# Patient Record
Sex: MALE | Race: WHITE | Employment: OTHER | URBAN - NONMETROPOLITAN AREA
[De-identification: names, ages, dates, MRNs, and addresses within clinical notes are randomized per-mention and may not be internally consistent; named-entity substitution may affect disease eponyms.]

---

## 2020-02-07 ENCOUNTER — OFFICE VISIT (OUTPATIENT)
Dept: PRIMARY CARE CLINIC | Age: 75
End: 2020-02-07
Payer: MEDICARE

## 2020-02-07 VITALS
SYSTOLIC BLOOD PRESSURE: 132 MMHG | DIASTOLIC BLOOD PRESSURE: 74 MMHG | BODY MASS INDEX: 34.01 KG/M2 | OXYGEN SATURATION: 98 % | HEART RATE: 74 BPM | TEMPERATURE: 98 F | WEIGHT: 237 LBS

## 2020-02-07 PROCEDURE — G8427 DOCREV CUR MEDS BY ELIG CLIN: HCPCS | Performed by: FAMILY MEDICINE

## 2020-02-07 PROCEDURE — G8484 FLU IMMUNIZE NO ADMIN: HCPCS | Performed by: FAMILY MEDICINE

## 2020-02-07 PROCEDURE — 4004F PT TOBACCO SCREEN RCVD TLK: CPT | Performed by: FAMILY MEDICINE

## 2020-02-07 PROCEDURE — 4040F PNEUMOC VAC/ADMIN/RCVD: CPT | Performed by: FAMILY MEDICINE

## 2020-02-07 PROCEDURE — 99202 OFFICE O/P NEW SF 15 MIN: CPT | Performed by: FAMILY MEDICINE

## 2020-02-07 PROCEDURE — 3017F COLORECTAL CA SCREEN DOC REV: CPT | Performed by: FAMILY MEDICINE

## 2020-02-07 PROCEDURE — G8419 CALC BMI OUT NRM PARAM NOF/U: HCPCS | Performed by: FAMILY MEDICINE

## 2020-02-07 PROCEDURE — 99203 OFFICE O/P NEW LOW 30 MIN: CPT | Performed by: FAMILY MEDICINE

## 2020-02-07 PROCEDURE — 1123F ACP DISCUSS/DSCN MKR DOCD: CPT | Performed by: FAMILY MEDICINE

## 2020-02-07 RX ORDER — AZITHROMYCIN 250 MG/1
TABLET, FILM COATED ORAL
Qty: 1 PACKET | Refills: 1 | Status: SHIPPED | OUTPATIENT
Start: 2020-02-07 | End: 2022-05-05 | Stop reason: ALTCHOICE

## 2020-02-07 ASSESSMENT — ENCOUNTER SYMPTOMS
RHINORRHEA: 1
CONSTIPATION: 0
SINUS COMPLAINT: 1
NAUSEA: 0
SORE THROAT: 1
SINUS PAIN: 1
COUGH: 1
SINUS PRESSURE: 1
ABDOMINAL PAIN: 0
WHEEZING: 0
DIARRHEA: 0
EYE DISCHARGE: 0
VOMITING: 0
TROUBLE SWALLOWING: 0
SHORTNESS OF BREATH: 0
EYE REDNESS: 0

## 2020-02-07 ASSESSMENT — PATIENT HEALTH QUESTIONNAIRE - PHQ9
SUM OF ALL RESPONSES TO PHQ QUESTIONS 1-9: 0
1. LITTLE INTEREST OR PLEASURE IN DOING THINGS: 0
SUM OF ALL RESPONSES TO PHQ9 QUESTIONS 1 & 2: 0
SUM OF ALL RESPONSES TO PHQ QUESTIONS 1-9: 0
2. FEELING DOWN, DEPRESSED OR HOPELESS: 0

## 2020-02-07 NOTE — PROGRESS NOTES
2020     Iris (:  3/5/1944) is a 76 y.o. male, here for evaluation of the following medical concerns:    Sinus Problem   This is a new problem. The current episode started 1 to 4 weeks ago. The problem has been gradually worsening since onset. There has been no fever. Associated symptoms include congestion, coughing, headaches, sinus pressure and a sore throat (feels raw). Pertinent negatives include no chills, ear pain, neck pain or shortness of breath. Treatments tried: theraflu. The treatment provided moderate relief. Did review patient's med list, allergies, social history,pmhx and pshx today as noted in the record. Review of Systems   Constitutional: Negative for chills, fatigue and fever. HENT: Positive for congestion, postnasal drip, rhinorrhea, sinus pressure, sinus pain and sore throat (feels raw). Negative for ear pain and trouble swallowing. Eyes: Negative for discharge and redness. Respiratory: Positive for cough. Negative for shortness of breath and wheezing. Cardiovascular: Negative for chest pain. Gastrointestinal: Negative for abdominal pain, constipation, diarrhea, nausea and vomiting. Genitourinary: Negative for dysuria, flank pain, frequency and urgency. Musculoskeletal: Negative for arthralgias, myalgias and neck pain. Skin: Negative for rash and wound. Allergic/Immunologic: Negative for environmental allergies. Neurological: Positive for headaches. Negative for dizziness, weakness and light-headedness. Hematological: Negative for adenopathy. Psychiatric/Behavioral: Negative. Prior to Visit Medications    Medication Sig Taking? Authorizing Provider   GLIPIZIDE PO Take 5 mg by mouth daily  Yes Historical Provider, MD   metFORMIN (GLUCOPHAGE) 850 MG tablet Take 850 mg by mouth 2 times daily (with meals).  Yes Historical Provider, MD        Social History     Tobacco Use    Smoking status: Current Every Day Smoker     Packs/day: 1.50 Types: Cigarettes    Smokeless tobacco: Never Used   Substance Use Topics    Alcohol use: No        Vitals:    02/07/20 1502   BP: 132/74   Site: Left Upper Arm   Position: Sitting   Cuff Size: Large Adult   Pulse: 74   Temp: 98 °F (36.7 °C)   TempSrc: Tympanic   SpO2: 98%   Weight: 237 lb (107.5 kg)     Estimated body mass index is 34.01 kg/m² as calculated from the following:    Height as of 7/11/16: 5' 10\" (1.778 m). Weight as of this encounter: 237 lb (107.5 kg). Physical Exam  Vitals signs and nursing note reviewed. Constitutional:       General: He is not in acute distress. Appearance: Normal appearance. He is well-developed. He is not diaphoretic. HENT:      Head: Normocephalic and atraumatic. Right Ear: External ear normal.      Left Ear: External ear normal.      Ears:      Comments: TMs dull with fluid behind the TM     Nose: Congestion and rhinorrhea present. Mouth/Throat:      Pharynx: No posterior oropharyngeal erythema. Comments: Post nasal drainage noted  Eyes:      General: No scleral icterus. Right eye: No discharge. Left eye: No discharge. Conjunctiva/sclera: Conjunctivae normal.      Pupils: Pupils are equal, round, and reactive to light. Neck:      Musculoskeletal: Normal range of motion and neck supple. Thyroid: No thyromegaly. Cardiovascular:      Rate and Rhythm: Normal rate and regular rhythm. Heart sounds: Normal heart sounds. Pulmonary:      Effort: Pulmonary effort is normal. No respiratory distress. Breath sounds: Normal breath sounds. No wheezing. Lymphadenopathy:      Cervical: Cervical adenopathy present. Skin:     General: Skin is warm and dry. Findings: No rash. Neurological:      Mental Status: He is alert and oriented to person, place, and time. Psychiatric:         Behavior: Behavior normal.         Thought Content:  Thought content normal.         Judgment: Judgment normal.

## 2022-04-22 ENCOUNTER — HOSPITAL ENCOUNTER (OUTPATIENT)
Dept: LAB | Age: 77
Discharge: HOME | End: 2022-04-22
Payer: MEDICARE

## 2022-04-22 LAB
ABSOLUTE EOS #: 0.29 K/UL (ref 0–0.44)
ABSOLUTE IMMATURE GRANULOCYTE: <0.03 K/UL (ref 0–0.3)
ABSOLUTE LYMPH #: 1.1 K/UL (ref 1.1–3.7)
ABSOLUTE MONO #: 0.75 K/UL (ref 0.1–1.2)
ANION GAP SERPL CALCULATED.3IONS-SCNC: 8 MMOL/L (ref 9–17)
BASOPHILS # BLD: 1 % (ref 0–2)
BASOPHILS ABSOLUTE: 0.04 K/UL (ref 0–0.2)
BUN BLDV-MCNC: 21 MG/DL (ref 8–23)
BUN/CREAT BLD: 9 (ref 9–20)
CALCIUM SERPL-MCNC: 8.7 MG/DL (ref 8.6–10.4)
CHLORIDE BLD-SCNC: 99 MMOL/L (ref 98–107)
CO2: 27 MMOL/L (ref 20–31)
CREAT SERPL-MCNC: 2.32 MG/DL (ref 0.7–1.2)
EOSINOPHILS RELATIVE PERCENT: 4 % (ref 1–4)
GFR AFRICAN AMERICAN: 33 ML/MIN
GFR NON-AFRICAN AMERICAN: 27 ML/MIN
GFR SERPL CREATININE-BSD FRML MDRD: ABNORMAL ML/MIN/{1.73_M2}
GLUCOSE BLD-MCNC: 179 MG/DL (ref 70–99)
HCT VFR BLD CALC: 25.4 % (ref 40.7–50.3)
HEMOGLOBIN: 8.1 G/DL (ref 13–17)
IMMATURE GRANULOCYTES: 0 %
LYMPHOCYTES # BLD: 15 % (ref 24–43)
MAGNESIUM: 2 MG/DL (ref 1.6–2.6)
MCH RBC QN AUTO: 29.9 PG (ref 25.2–33.5)
MCHC RBC AUTO-ENTMCNC: 31.9 G/DL (ref 25.2–33.5)
MCV RBC AUTO: 93.7 FL (ref 82.6–102.9)
MONOCYTES # BLD: 10 % (ref 3–12)
NRBC AUTOMATED: 0 PER 100 WBC
PDW BLD-RTO: 16.4 % (ref 11.8–14.4)
PLATELET # BLD: 261 K/UL (ref 138–453)
PMV BLD AUTO: 10.3 FL (ref 8.1–13.5)
POTASSIUM SERPL-SCNC: 4.4 MMOL/L (ref 3.7–5.3)
RBC # BLD: 2.71 M/UL (ref 4.21–5.77)
RBC # BLD: ABNORMAL 10*6/UL
SEG NEUTROPHILS: 70 % (ref 36–65)
SEGMENTED NEUTROPHILS ABSOLUTE COUNT: 5.03 K/UL (ref 1.5–8.1)
SODIUM BLD-SCNC: 134 MMOL/L (ref 135–144)
WBC # BLD: 7.2 K/UL (ref 3.5–11.3)

## 2022-04-22 PROCEDURE — 36415 COLL VENOUS BLD VENIPUNCTURE: CPT

## 2022-04-22 PROCEDURE — 80048 BASIC METABOLIC PNL TOTAL CA: CPT

## 2022-04-22 PROCEDURE — 83735 ASSAY OF MAGNESIUM: CPT

## 2022-04-22 PROCEDURE — 85025 COMPLETE CBC W/AUTO DIFF WBC: CPT

## 2022-04-29 ENCOUNTER — OFFICE VISIT (OUTPATIENT)
Dept: FAMILY MEDICINE CLINIC | Age: 77
End: 2022-04-29
Payer: MEDICARE

## 2022-04-29 ENCOUNTER — HOSPITAL ENCOUNTER (OUTPATIENT)
Dept: GENERAL RADIOLOGY | Age: 77
Discharge: HOME | End: 2022-05-01
Payer: MEDICARE

## 2022-04-29 ENCOUNTER — HOSPITAL ENCOUNTER (OUTPATIENT)
Dept: LAB | Age: 77
Discharge: HOME | End: 2022-04-29
Payer: MEDICARE

## 2022-04-29 VITALS
HEART RATE: 58 BPM | DIASTOLIC BLOOD PRESSURE: 60 MMHG | OXYGEN SATURATION: 99 % | SYSTOLIC BLOOD PRESSURE: 128 MMHG | HEIGHT: 70 IN | BODY MASS INDEX: 29.06 KG/M2 | WEIGHT: 203 LBS

## 2022-04-29 DIAGNOSIS — Z76.89 ENCOUNTER TO ESTABLISH CARE: Primary | ICD-10-CM

## 2022-04-29 DIAGNOSIS — I10 PRIMARY HYPERTENSION: ICD-10-CM

## 2022-04-29 DIAGNOSIS — L97.511 SKIN ULCER OF TOE OF RIGHT FOOT, LIMITED TO BREAKDOWN OF SKIN (HCC): ICD-10-CM

## 2022-04-29 DIAGNOSIS — N18.4 CKD (CHRONIC KIDNEY DISEASE) STAGE 4, GFR 15-29 ML/MIN (HCC): ICD-10-CM

## 2022-04-29 DIAGNOSIS — E11.8 TYPE 2 DIABETES MELLITUS WITH COMPLICATION (HCC): ICD-10-CM

## 2022-04-29 DIAGNOSIS — Z87.19 H/O LOWER GASTROINTESTINAL BLEEDING: ICD-10-CM

## 2022-04-29 DIAGNOSIS — I25.810 CORONARY ARTERY DISEASE INVOLVING CORONARY BYPASS GRAFT OF NATIVE HEART WITHOUT ANGINA PECTORIS: ICD-10-CM

## 2022-04-29 DIAGNOSIS — K59.00 CONSTIPATION, UNSPECIFIED CONSTIPATION TYPE: ICD-10-CM

## 2022-04-29 LAB
ABSOLUTE EOS #: 0.22 K/UL (ref 0–0.44)
ABSOLUTE IMMATURE GRANULOCYTE: <0.03 K/UL (ref 0–0.3)
ABSOLUTE LYMPH #: 1.08 K/UL (ref 1.1–3.7)
ABSOLUTE MONO #: 0.66 K/UL (ref 0.1–1.2)
ANION GAP SERPL CALCULATED.3IONS-SCNC: 11 MMOL/L (ref 9–17)
BASOPHILS # BLD: 1 % (ref 0–2)
BASOPHILS ABSOLUTE: 0.03 K/UL (ref 0–0.2)
BUN BLDV-MCNC: 25 MG/DL (ref 8–23)
BUN/CREAT BLD: 12 (ref 9–20)
CALCIUM SERPL-MCNC: 8.8 MG/DL (ref 8.6–10.4)
CHLORIDE BLD-SCNC: 101 MMOL/L (ref 98–107)
CO2: 25 MMOL/L (ref 20–31)
CREAT SERPL-MCNC: 2.12 MG/DL (ref 0.7–1.2)
EOSINOPHILS RELATIVE PERCENT: 3 % (ref 1–4)
GFR AFRICAN AMERICAN: 37 ML/MIN
GFR NON-AFRICAN AMERICAN: 30 ML/MIN
GFR SERPL CREATININE-BSD FRML MDRD: ABNORMAL ML/MIN/{1.73_M2}
GLUCOSE BLD-MCNC: 74 MG/DL (ref 70–99)
HCT VFR BLD CALC: 25.7 % (ref 40.7–50.3)
HEMOGLOBIN: 8 G/DL (ref 13–17)
IMMATURE GRANULOCYTES: 0 %
LYMPHOCYTES # BLD: 17 % (ref 24–43)
MCH RBC QN AUTO: 28.9 PG (ref 25.2–33.5)
MCHC RBC AUTO-ENTMCNC: 31.1 G/DL (ref 25.2–33.5)
MCV RBC AUTO: 92.8 FL (ref 82.6–102.9)
MONOCYTES # BLD: 10 % (ref 3–12)
NRBC AUTOMATED: 0 PER 100 WBC
PDW BLD-RTO: 15.8 % (ref 11.8–14.4)
PLATELET # BLD: 220 K/UL (ref 138–453)
PMV BLD AUTO: 10.6 FL (ref 8.1–13.5)
POTASSIUM SERPL-SCNC: 4 MMOL/L (ref 3.7–5.3)
RBC # BLD: 2.77 M/UL (ref 4.21–5.77)
RBC # BLD: ABNORMAL 10*6/UL
SEG NEUTROPHILS: 69 % (ref 36–65)
SEGMENTED NEUTROPHILS ABSOLUTE COUNT: 4.42 K/UL (ref 1.5–8.1)
SODIUM BLD-SCNC: 137 MMOL/L (ref 135–144)
WBC # BLD: 6.4 K/UL (ref 3.5–11.3)

## 2022-04-29 PROCEDURE — 99214 OFFICE O/P EST MOD 30 MIN: CPT | Performed by: FAMILY MEDICINE

## 2022-04-29 PROCEDURE — 99213 OFFICE O/P EST LOW 20 MIN: CPT | Performed by: FAMILY MEDICINE

## 2022-04-29 PROCEDURE — G8427 DOCREV CUR MEDS BY ELIG CLIN: HCPCS | Performed by: FAMILY MEDICINE

## 2022-04-29 PROCEDURE — 4040F PNEUMOC VAC/ADMIN/RCVD: CPT | Performed by: FAMILY MEDICINE

## 2022-04-29 PROCEDURE — G8419 CALC BMI OUT NRM PARAM NOF/U: HCPCS | Performed by: FAMILY MEDICINE

## 2022-04-29 PROCEDURE — 73620 X-RAY EXAM OF FOOT: CPT

## 2022-04-29 PROCEDURE — 1123F ACP DISCUSS/DSCN MKR DOCD: CPT | Performed by: FAMILY MEDICINE

## 2022-04-29 PROCEDURE — 80048 BASIC METABOLIC PNL TOTAL CA: CPT

## 2022-04-29 PROCEDURE — 1036F TOBACCO NON-USER: CPT | Performed by: FAMILY MEDICINE

## 2022-04-29 PROCEDURE — 36415 COLL VENOUS BLD VENIPUNCTURE: CPT

## 2022-04-29 PROCEDURE — 85025 COMPLETE CBC W/AUTO DIFF WBC: CPT

## 2022-04-29 RX ORDER — LANOLIN ALCOHOL/MO/W.PET/CERES
200 CREAM (GRAM) TOPICAL DAILY
COMMUNITY
Start: 2022-03-31

## 2022-04-29 RX ORDER — HYDRALAZINE HYDROCHLORIDE 10 MG/1
10 TABLET, FILM COATED ORAL 3 TIMES DAILY
COMMUNITY
Start: 2022-04-19 | End: 2022-08-05

## 2022-04-29 RX ORDER — ERGOCALCIFEROL (VITAMIN D2) 1250 MCG
CAPSULE ORAL
COMMUNITY
Start: 2021-10-12

## 2022-04-29 RX ORDER — DOXYCYCLINE HYCLATE 100 MG
100 TABLET ORAL 2 TIMES DAILY
Qty: 20 TABLET | Refills: 0 | Status: SHIPPED | OUTPATIENT
Start: 2022-04-29 | End: 2022-05-09

## 2022-04-29 RX ORDER — BISACODYL 10 MG
10 SUPPOSITORY, RECTAL RECTAL DAILY
COMMUNITY
End: 2022-09-12

## 2022-04-29 RX ORDER — METOPROLOL SUCCINATE 25 MG/1
25 TABLET, EXTENDED RELEASE ORAL DAILY
COMMUNITY
Start: 2021-09-30 | End: 2022-07-11 | Stop reason: DRUGHIGH

## 2022-04-29 RX ORDER — ATORVASTATIN CALCIUM 80 MG/1
80 TABLET, FILM COATED ORAL DAILY
COMMUNITY
End: 2022-07-06

## 2022-04-29 RX ORDER — AMIODARONE HYDROCHLORIDE 200 MG/1
100 TABLET ORAL DAILY
COMMUNITY
Start: 2022-02-24

## 2022-04-29 RX ORDER — ISOSORBIDE DINITRATE 10 MG/1
10 TABLET ORAL 2 TIMES DAILY
COMMUNITY
Start: 2022-04-19

## 2022-04-29 RX ORDER — ATORVASTATIN CALCIUM 40 MG/1
40 TABLET, FILM COATED ORAL NIGHTLY
COMMUNITY
Start: 2021-09-30

## 2022-04-29 RX ORDER — INSULIN LISPRO 100 [IU]/ML
INJECTION, SOLUTION INTRAVENOUS; SUBCUTANEOUS
COMMUNITY
Start: 2022-02-24

## 2022-04-29 RX ORDER — ACETAMINOPHEN 325 MG/1
650 TABLET ORAL EVERY 6 HOURS PRN
Status: ON HOLD | COMMUNITY
End: 2022-07-02 | Stop reason: HOSPADM

## 2022-04-29 RX ORDER — QUETIAPINE FUMARATE 25 MG/1
25 TABLET, FILM COATED ORAL
COMMUNITY
Start: 2022-02-24

## 2022-04-29 RX ORDER — ALBUTEROL SULFATE 2.5 MG/3ML
2.5 SOLUTION RESPIRATORY (INHALATION) EVERY 6 HOURS PRN
COMMUNITY
End: 2022-08-05

## 2022-04-29 RX ORDER — FAMOTIDINE 20 MG/1
20 TABLET, FILM COATED ORAL 2 TIMES DAILY
COMMUNITY
Start: 2022-02-24 | End: 2022-07-06

## 2022-04-29 RX ORDER — POLYETHYLENE GLYCOL 3350 17 G/17G
17 POWDER, FOR SOLUTION ORAL DAILY PRN
Qty: 510 G | Refills: 0 | Status: SHIPPED | OUTPATIENT
Start: 2022-04-29 | End: 2022-05-29

## 2022-04-29 RX ORDER — ASPIRIN 81 MG/1
81 TABLET ORAL DAILY
COMMUNITY

## 2022-04-29 RX ORDER — FUROSEMIDE 40 MG/1
40 TABLET ORAL DAILY
COMMUNITY
Start: 2022-03-14 | End: 2022-05-13 | Stop reason: SDUPTHER

## 2022-04-29 SDOH — ECONOMIC STABILITY: FOOD INSECURITY: WITHIN THE PAST 12 MONTHS, THE FOOD YOU BOUGHT JUST DIDN'T LAST AND YOU DIDN'T HAVE MONEY TO GET MORE.: NEVER TRUE

## 2022-04-29 SDOH — ECONOMIC STABILITY: FOOD INSECURITY: WITHIN THE PAST 12 MONTHS, YOU WORRIED THAT YOUR FOOD WOULD RUN OUT BEFORE YOU GOT MONEY TO BUY MORE.: NEVER TRUE

## 2022-04-29 ASSESSMENT — SOCIAL DETERMINANTS OF HEALTH (SDOH): HOW HARD IS IT FOR YOU TO PAY FOR THE VERY BASICS LIKE FOOD, HOUSING, MEDICAL CARE, AND HEATING?: NOT HARD AT ALL

## 2022-04-29 ASSESSMENT — PATIENT HEALTH QUESTIONNAIRE - PHQ9
2. FEELING DOWN, DEPRESSED OR HOPELESS: 0
SUM OF ALL RESPONSES TO PHQ QUESTIONS 1-9: 0
1. LITTLE INTEREST OR PLEASURE IN DOING THINGS: 0
SUM OF ALL RESPONSES TO PHQ QUESTIONS 1-9: 0
SUM OF ALL RESPONSES TO PHQ9 QUESTIONS 1 & 2: 0

## 2022-04-29 NOTE — PROGRESS NOTES
HPI:  Patient comes in today for   Chief Complaint   Patient presents with    Diabetes    Hypertension   Patient here to establish has multiple medical problems use to follow with [facility] has CAD s/p CABG in 2/2022,cardiomyopathy  ,s/p CVA in 8/2021 had thrombolytics no residual probelms,HTN,DMType 2,CKD. Recently was hospitalized to [facility] with rectal bleed has had EGD and colonoscopy with polypectomy likely source of bleed. .No more blood in stools. No chest pain or SOB. Uses a walker or cane to ambulate. Has a sore in right great toe for the last 3 months is scheduled to see podiatry next week was seen by wound care while at [facility] has arterial doppler study was told was ok. Has quit smoking has been on nicoderm patch since his heart surgery. HISTORY:  Past Medical History:   Diagnosis Date    Cerebrovascular accident (CVA) due to embolism of cerebral artery ([address]     Coronary artery disease involving coronary bypass graft of native heart without angina pectoris     Tobacco abuse     Type II or unspecified type diabetes mellitus without mention of complication, not stated as uncontrolled        History reviewed. No pertinent surgical history. History reviewed. No pertinent family history.     Social History     Socioeconomic History    Marital status:      Spouse name: Not on file    Number of children: Not on file    Years of education: Not on file    Highest education level: Not on file   Occupational History    Not on file   Tobacco Use    Smoking status: Former Smoker     Packs/day: 1.50     Types: Cigarettes    Smokeless tobacco: Never Used   Substance and Sexual Activity    Alcohol use: No    Drug use: No    Sexual activity: Not on file   Other Topics Concern    Not on file   Social History Narrative    Not on file     Social Determinants of Health     Financial Resource Strain: Low Risk     Difficulty of Paying Living Expenses: Not hard at all   Food Insecurity: No Food Insecurity    Worried About Running Out of Food in the Last Year: Never true    Rylie of Food in the Last Year: Never true   Transportation Needs:     Lack of Transportation (Medical): Not on file    Lack of Transportation (Non-Medical):  Not on file   Physical Activity:     Days of Exercise per Week: Not on file    Minutes of Exercise per Session: Not on file   Stress:     Feeling of Stress : Not on file   Social Connections:     Frequency of Communication with Friends and Family: Not on file    Frequency of Social Gatherings with Friends and Family: Not on file    Attends Restorationist Services: Not on file    Active Member of [address] [place] [address] or Organizations: Not on file    Attends Club or Organization Meetings: Not on file    Marital Status: Not on file   Intimate Partner Violence:     Fear of Current or Ex-Partner: Not on file    Emotionally Abused: Not on file    Physically Abused: Not on file    Sexually Abused: Not on file   Housing Stability:     Unable to Pay for Housing in the Last Year: Not on file    Number of [place] in the Last Year: Not on file    Unstable Housing in the Last Year: Not on file       Current Outpatient Medications   Medication Sig Dispense Refill    Multiple Vitamins-Minerals (PRESERVISION AREDS 2 PO) Take 1 tablet by mouth 2 times daily      acetaminophen (TYLENOL) 325 MG tablet Take 650 mg by mouth every 6 hours as needed      albuterol (PROVENTIL) (2.5 MG/3ML) 0.083% nebulizer solution Inhale 2.5 mg into the lungs every 6 hours as needed      amiodarone (CORDARONE) 200 MG tablet Take 200 mg by mouth daily      apixaban (ELIQUIS) 5 MG TABS tablet Take 5 mg by mouth 2 times daily      aspirin 81 MG EC tablet Take 81 mg by mouth daily      bisacodyl (DULCOLAX) 10 MG suppository Place 10 mg rectally daily      ergocalciferol (ERGOCALCIFEROL) 1.25 MG (31283 UT) capsule TAKE ONE CAPSULE BY MOUTH ONCE EVERY WEEK FOR VITAMIN (D) DEFICIENCY      famotidine (PEPCID) 20 MG tablet Take 20 mg by mouth 2 times daily      furosemide (LASIX) 40 MG tablet Take 40 mg by mouth daily      hydrALAZINE (APRESOLINE) 10 MG tablet Take 10 mg by mouth 3 times daily      insulin lispro, 1 Unit Dial, 100 UNIT/ML SOPN Per s/s      isosorbide dinitrate (ISORDIL) 10 MG tablet Take 10 mg by mouth 3 times daily      magnesium oxide (MAG-OX) 400 (240 Mg) MG tablet Take 200 mg by mouth daily      metoprolol succinate (TOPROL XL) 25 MG extended release tablet Take 25 mg by mouth daily      QUEtiapine (SEROQUEL) 25 MG tablet Take 25 mg by mouth      atorvastatin (LIPITOR) 80 MG tablet Take 80 mg by mouth daily      GLIPIZIDE PO Take 5 mg by mouth daily       atorvastatin (LIPITOR) 40 MG tablet Take 40 mg by mouth nightly (Patient not taking: Reported on 4/29/2022)      azithromycin (ZITHROMAX Z-STEVE) 250 MG tablet Take as directed (Patient not taking: Reported on 4/29/2022) 1 packet 1    metFORMIN (GLUCOPHAGE) 850 MG tablet Take 850 mg by mouth 2 times daily (with meals). (Patient not taking: Reported on 4/29/2022)       No current facility-administered medications for this visit. Allergies   Allergen Reactions    Simvastatin Headaches       REVIEW OF SYSTEMS:  General: No fevers, chills, change in weight  HEENT: No double vision, blurry vision, runny nose, sore throat, tinnitus  Cardio: No chest pain, palpitations, WILLIS, edema, PND  Pulmonary: No cough, hemoptysis, SOB  GI: No nausea, vomiting, dysphagia, odynophagia, diarrhea, has chronic constipation.   : No dysuria, hematuria, urgency, incontinence  Musculoskeletal: No muscle or joint aches, no joint swelling  Neuro: No dizziness/lightheadedness, no seizures  Endocrine: No polyuria, polydipsia, polyphagia, no temperature intolerance  Skin: No lesions or itching  Sleep: good  Psychiatric: No depression or anxiety    PHYSICAL EXAM:  VS:  /60   Pulse 58   Ht 5' 10\" (1.778 m)   Wt 203 lb (92.1 kg)   SpO2 99%   BMI 29.13 kg/m² General:  Alert and oriented, NAD  HEENT:  TMs, STEFAN, EOMI, Conjunctivae clear       Throat currently clear. NECK:  Supple without adenopathy or thyromegaly, no carotid bruits  LUNGS:  CTA all fields  HEART:  Irregular  ABDOMEN:  Soft and nontender without palpable abnormalities  EXTREMITIES:Right great toe  with redness and swelling has a open area in the medial aspect with a eschar,has swelling in the feet and ankles. , no calf tenderness  NEURO:  No focal deficits. SKIN: Dry skin has some erythematous areas in both legs. ASSESSMENT/PLAN:     Diagnosis Orders   1. Encounter to establish care     2. CKD (chronic kidney disease) stage 4, GFR 15-29 ml/min ([facility])     3. Type 2 diabetes mellitus with complication ([facility])  Hemoglobin A1C   4. Coronary artery disease involving coronary bypass graft of native heart without angina pectoris     5. Skin ulcer of toe of right foot, limited to breakdown of skin ([facility])  XR FOOT RIGHT (2 VIEWS)    CBC with Auto Differential    Basic Metabolic Panel   6. Primary hypertension     7. H/O lower gastrointestinal bleeding     8. Constipation, unspecified constipation type         Orders Placed This Encounter   Procedures    XR FOOT RIGHT (2 VIEWS)     Standing Status:   Future     Standing Expiration Date:   4/29/2023    Hemoglobin A1C     Standing Status:   Future     Standing Expiration Date:   4/29/2023    CBC with Auto Differential     Standing Status:   Future     Standing Expiration Date:   4/29/2023    Basic Metabolic Panel     Standing Status:   Future     Standing Expiration Date:   4/29/2023     Requested Prescriptions      No prescriptions requested or ordered in this encounter   Xray right foot. Will start on Doxycycline 100 mg bid x 10 days,  Continue wound care for right foot  Patient is scheduled to see podiatry for his toe infection. Labs from last week with Creatinine of 2.32  Is scheduled to follow with nephology from his hospital  visit.   Wean off nicoderm patch.  Medications reviewed,continue with current meds. Miralax prn constipation. Will check CBC and BMP and hgba1c in 3 months as per wife his recent one was 5.5. Continue with home health  Return in about 3 months (around 7/29/2022), or if symptoms worsen or fail to improve.     Electronically signed by Adelina Mojica MD

## 2022-05-02 ENCOUNTER — TELEPHONE (OUTPATIENT)
Dept: FAMILY MEDICINE CLINIC | Age: 77
End: 2022-05-02

## 2022-05-02 DIAGNOSIS — N18.4 CKD (CHRONIC KIDNEY DISEASE) STAGE 4, GFR 15-29 ML/MIN (HCC): Primary | ICD-10-CM

## 2022-05-02 NOTE — TELEPHONE ENCOUNTER
----- Message from Vianey Santoro MD sent at 5/2/2022 10:50 AM EDT -----  Xray foot look ok ,f/u with podaitry

## 2022-05-02 NOTE — TELEPHONE ENCOUNTER
Writer called pt's wife, Juan José Edward, and informed her of creatinine results. Nyasia stated that they do not have a nephrologist to follow up with. Referral for nephrology placed today. Nyasia voiced understanding and had no further questions.

## 2022-05-02 NOTE — TELEPHONE ENCOUNTER
Writer called pt's wife and informed her of foot x-ray results and to f/u with podiatry this week. Pt's wife, Earl Reynoso, voiced understanding and had no further questions.

## 2022-05-02 NOTE — TELEPHONE ENCOUNTER
----- Message from Madelene Riedel, MD sent at 5/2/2022 10:52 AM EDT -----  Creatinine has slightly improved has follow up with nephrology from recent hospital visit

## 2022-05-05 ENCOUNTER — APPOINTMENT (OUTPATIENT)
Dept: INTERVENTIONAL RADIOLOGY/VASCULAR | Age: 77
End: 2022-05-05
Payer: MEDICARE

## 2022-05-05 ENCOUNTER — OFFICE VISIT (OUTPATIENT)
Dept: PODIATRY | Age: 77
End: 2022-05-05
Payer: OTHER GOVERNMENT

## 2022-05-05 VITALS
SYSTOLIC BLOOD PRESSURE: 118 MMHG | RESPIRATION RATE: 20 BRPM | TEMPERATURE: 96.3 F | HEART RATE: 60 BPM | DIASTOLIC BLOOD PRESSURE: 60 MMHG

## 2022-05-05 DIAGNOSIS — I73.9 PAD (PERIPHERAL ARTERY DISEASE) (HCC): ICD-10-CM

## 2022-05-05 DIAGNOSIS — L97.512 SKIN ULCER OF RIGHT GREAT TOE WITH FAT LAYER EXPOSED (HCC): Primary | ICD-10-CM

## 2022-05-05 PROCEDURE — 4040F PNEUMOC VAC/ADMIN/RCVD: CPT | Performed by: PODIATRIST

## 2022-05-05 PROCEDURE — 1036F TOBACCO NON-USER: CPT | Performed by: PODIATRIST

## 2022-05-05 PROCEDURE — 1123F ACP DISCUSS/DSCN MKR DOCD: CPT | Performed by: PODIATRIST

## 2022-05-05 PROCEDURE — 99214 OFFICE O/P EST MOD 30 MIN: CPT | Performed by: PODIATRIST

## 2022-05-05 PROCEDURE — 99204 OFFICE O/P NEW MOD 45 MIN: CPT | Performed by: PODIATRIST

## 2022-05-05 PROCEDURE — G8417 CALC BMI ABV UP PARAM F/U: HCPCS | Performed by: PODIATRIST

## 2022-05-05 PROCEDURE — A9283 FOOT PRESS OFF LOAD SUPP DEV: HCPCS | Performed by: PODIATRIST

## 2022-05-05 PROCEDURE — G8427 DOCREV CUR MEDS BY ELIG CLIN: HCPCS | Performed by: PODIATRIST

## 2022-05-05 NOTE — PATIENT INSTRUCTIONS
Use betadine to right great toe twice daily then cover with a dry dressing. Use off loading surgical shoe at all times on right foot. Get vascular testing done as scheduled. Return to see Snow Handley as scheduled next week.

## 2022-05-05 NOTE — PROGRESS NOTES
Subjective:    Yennifer Aceves is a 68 y.o. male who presents with the ulceration of the R big toe. . Patient has not been compliant with off loading. Patient relates pain is Present. Pain is rated 3 out of 10 and is described as intermittent. Treatment so far was cleaned with saline and antibiotic ointment. Patient given antibiotic last week and is still on it. Patient states had test done while in the hospital recently for another issue. Was told her blood flow was okay. Currently denies F/C/N/V. Overall diabetic control is not changed. Allergies   Allergen Reactions    Simvastatin Headaches       Past Medical History:   Diagnosis Date    Cerebrovascular accident (CVA) due to embolism of cerebral artery (HCC)     CKD (chronic kidney disease) stage 4, GFR 15-29 ml/min ([facility])     Coronary artery disease involving coronary bypass graft of native heart without angina pectoris     H/O lower gastrointestinal bleeding     Tobacco abuse     Type II or unspecified type diabetes mellitus without mention of complication, not stated as uncontrolled        Prior to Admission medications    Medication Sig Start Date End Date Taking?  Authorizing Provider   Multiple Vitamins-Minerals (PRESERVISION AREDS 2 PO) Take 1 tablet by mouth 2 times daily   Yes Historical Provider, MD   acetaminophen (TYLENOL) 325 MG tablet Take 650 mg by mouth every 6 hours as needed   Yes Historical Provider, MD   albuterol (PROVENTIL) (2.5 MG/3ML) 0.083% nebulizer solution Inhale 2.5 mg into the lungs every 6 hours as needed   Yes Historical Provider, MD   amiodarone (CORDARONE) 200 MG tablet Take 200 mg by mouth daily 2/24/22  Yes Historical Provider, MD   apixaban (ELIQUIS) 5 MG TABS tablet Take 5 mg by mouth 2 times daily 3/14/22  Yes Historical Provider, MD   aspirin 81 MG EC tablet Take 81 mg by mouth daily   Yes Historical Provider, MD   atorvastatin (LIPITOR) 40 MG tablet Take 40 mg by mouth nightly  9/30/21  Yes Historical Provider, MD   bisacodyl (DULCOLAX) 10 MG suppository Place 10 mg rectally daily   Yes Historical Provider, MD   ergocalciferol (ERGOCALCIFEROL) 1.25 MG (48603 UT) capsule TAKE ONE CAPSULE BY MOUTH ONCE EVERY WEEK FOR VITAMIN (D) DEFICIENCY 10/12/21  Yes Historical Provider, MD   famotidine (PEPCID) 20 MG tablet Take 20 mg by mouth 2 times daily 2/24/22  Yes Historical Provider, MD   furosemide (LASIX) 40 MG tablet Take 40 mg by mouth daily 3/14/22  Yes Historical Provider, MD   hydrALAZINE (APRESOLINE) 10 MG tablet Take 10 mg by mouth 3 times daily 4/19/22  Yes Historical Provider, MD   insulin lispro, 1 Unit Dial, 100 UNIT/ML SOPN Per s/s 2/24/22  Yes Historical Provider, MD   isosorbide dinitrate (ISORDIL) 10 MG tablet Take 10 mg by mouth 3 times daily 4/19/22  Yes Historical Provider, MD   magnesium oxide (MAG-OX) 400 (240 Mg) MG tablet Take 200 mg by mouth daily 3/31/22  Yes Historical Provider, MD   metoprolol succinate (TOPROL XL) 25 MG extended release tablet Take 25 mg by mouth daily 9/30/21  Yes Historical Provider, MD   QUEtiapine (SEROQUEL) 25 MG tablet Take 25 mg by mouth 2/24/22  Yes Historical Provider, MD   atorvastatin (LIPITOR) 80 MG tablet Take 80 mg by mouth daily   Yes Historical Provider, MD   doxycycline hyclate (VIBRA-TABS) 100 MG tablet Take 1 tablet by mouth 2 times daily for 10 days 4/29/22 5/9/22 Yes Renee Magana MD   polyethylene glycol (GLYCOLAX) 17 GM/SCOOP powder Take 17 g by mouth daily as needed (CONSTIPATION) 4/29/22 5/29/22 Yes Renee Magana MD   GLIPIZIDE PO Take 5 mg by mouth daily    Yes Historical Provider, MD   metFORMIN (GLUCOPHAGE) 850 MG tablet Take 850 mg by mouth 2 times daily (with meals)    Yes Historical Provider, MD       Social History     Tobacco Use    Smoking status: Former Smoker     Packs/day: 1.50     Types: Cigarettes    Smokeless tobacco: Never Used   Substance Use Topics    Alcohol use: No       ROS: All 14 ROS systems reviewed and pertinent positives noted above, all others negative. Lower Extremity Physical Examination:     Vitals:   Vitals:    05/05/22 0851   BP: 118/60   Pulse:    Resp:    Temp:      General: AAO x 3 in NAD. Vascular: DP and PT pulses palpable 1/4, bilateral.  CFT >5 seconds, bilateral.  Hair growth absent to the level of the digits, bilateral.  Edema present, bilateral.  Varicosities present, bilateral. Erythema absent, bilateral. Distal Rubor present bilateral toes. Temperature decreased bilateral. Hyperpigmentation present bilateral. Atrophic skin yes. Neurological: Sensation intact to light touch to level of digits, bilateral.  Protective sensation intact 10/10 sites via 5.07/10g Mcalister-Pushpa Monofilament, bilateral.  negative Tinel's, bilateral.  negative Valleix sign, bilateral.  Vibratory intact bilateral.  Reflexes Decreased bilateral.  Paresthesias negative. Dysthesias negative. Sharp/dull intact bilateral.    Musculoskeletal: Muscle strength 5/5, bilateral.  Pain present upon palpation R hallux. Normal medial longitudinal arch, bilateral.  Ankle ROM decreased,bilateral.  1st MPJ ROM within normal limits, bilateral.  Dorsally contracted digits absent. No other foot deformities. Integument:   Open lesion present, Right. Ulcer medial right hallux with positive fibrin early eschar formation. There is no red granular tissue whatsoever. No undermining no probing no malodor no signs infection on periphery no proximal streaking. Interdigital maceration absent to web spaces , Bilateral.  Nails b/l thickened, dystrophic and crumbly, discolored with subungual debris. Fissures absent, Bilateral. Hyperkeratotic tissue is absent. Asessment: Patient is a 68 y.o. male with:    Diagnosis Orders   1. Skin ulcer of right great toe with fat layer exposed ([address]     2. PAD (peripheral artery disease) (HCC)  VL LOWER EXTREMITY ARTERIAL SEGMENTAL PRESSURES W PPG   3.  Uncontrolled type 2 DM with peripheral circulatory disorder (HCC)           Ulcer Classification:  Diabetic ulcer grade 2 C. IDSA  no infection. Plan:   Patient examined and evaluated. Diabetic foot education and exam.  Current condition and treatment options discussed in detail. Topical lidocaine applied to wound. Debridement none needed today. Today's dressing:betadine bid  Orders Placed This Encounter   Procedures    VL LOWER EXTREMITY ARTERIAL SEGMENTAL PRESSURES W PPG     Standing Status:   Future     Standing Expiration Date:   5/5/2023   DM foot ed and exam  Due to level of pain/deformity/condition, it is in my medical opinion that patient will benefit from and is medically necessary for offloading device at this time. Patient was dispensed a surgical shoe with offloading insole to wear 100% of the time WB. Patient was educated on appropriate use of the device and the need to be compliant with offloading therapy. Patient understands that non compliance with the device will be detrimental to the healing of the condition/ulceration. Patient needs the device to help support the foot and reduce pain. This device is medically necessary due to above listed diagnosis. Labs reviewed with pt in detail. All questions answered. Previous testing reviewed with patient in detail. Further recs post testing. Patient is moderate level medical decision making. Patient is chronic condition with exacerbation. Patient has test reviewed from another provider. Patient my risk morbidity due to the nonhealing ulceration with recent infection and PAD changes in the diabetic foot  Finish course of po abx  Contact clinic with any questions/problems/concerns or new signs of infection. Follow-up at [facility] in 1week(s).

## 2022-05-09 ENCOUNTER — HOSPITAL ENCOUNTER (OUTPATIENT)
Dept: WOUND CARE | Age: 77
Discharge: HOME | End: 2022-05-10
Payer: MEDICARE

## 2022-05-09 VITALS
RESPIRATION RATE: 20 BRPM | HEIGHT: 70 IN | DIASTOLIC BLOOD PRESSURE: 60 MMHG | SYSTOLIC BLOOD PRESSURE: 124 MMHG | HEART RATE: 64 BPM | BODY MASS INDEX: 29.13 KG/M2 | TEMPERATURE: 96.6 F

## 2022-05-09 DIAGNOSIS — L97.511 SKIN ULCER OF RIGHT GREAT TOE, LIMITED TO BREAKDOWN OF SKIN (HCC): ICD-10-CM

## 2022-05-09 DIAGNOSIS — I73.9 PAD (PERIPHERAL ARTERY DISEASE) (HCC): ICD-10-CM

## 2022-05-09 PROCEDURE — 99213 OFFICE O/P EST LOW 20 MIN: CPT | Performed by: PODIATRIST

## 2022-05-09 NOTE — PROGRESS NOTES
Subjective:    Bee Guevara is a 68 y.o. male who presents with the ulceration of the R big toe. . Patient has been compliant with off loading. Toe has felt better overall. Patient relates pain is Present. Pain is rated 2 out of 10 and is described as intermittent. No issues with dressings at home. Currently denies F/C/N/V. Overall diabetic control is not changed. Allergies   Allergen Reactions    Simvastatin Headaches       Past Medical History:   Diagnosis Date    Cerebrovascular accident (CVA) due to embolism of cerebral artery (HCC)     CKD (chronic kidney disease) stage 4, GFR 15-29 ml/min ([facility])     Coronary artery disease involving coronary bypass graft of native heart without angina pectoris     H/O lower gastrointestinal bleeding     Tobacco abuse     Type II or unspecified type diabetes mellitus without mention of complication, not stated as uncontrolled        Prior to Admission medications    Medication Sig Start Date End Date Taking?  Authorizing Provider   Multiple Vitamins-Minerals (PRESERVISION AREDS 2 PO) Take 1 tablet by mouth 2 times daily    Historical Provider, MD   acetaminophen (TYLENOL) 325 MG tablet Take 650 mg by mouth every 6 hours as needed    Historical Provider, MD   albuterol (PROVENTIL) (2.5 MG/3ML) 0.083% nebulizer solution Inhale 2.5 mg into the lungs every 6 hours as needed    Historical Provider, MD   amiodarone (CORDARONE) 200 MG tablet Take 200 mg by mouth daily 2/24/22   Historical Provider, MD   apixaban (ELIQUIS) 5 MG TABS tablet Take 5 mg by mouth 2 times daily 3/14/22   Historical Provider, MD   aspirin 81 MG EC tablet Take 81 mg by mouth daily    Historical Provider, MD   atorvastatin (LIPITOR) 40 MG tablet Take 40 mg by mouth nightly  9/30/21   Historical Provider, MD   bisacodyl (DULCOLAX) 10 MG suppository Place 10 mg rectally daily    Historical Provider, MD   ergocalciferol (ERGOCALCIFEROL) 1.25 MG (23237 UT) capsule TAKE ONE CAPSULE BY MOUTH ONCE EVERY WEEK FOR VITAMIN (D) DEFICIENCY 10/12/21   Historical Provider, MD   famotidine (PEPCID) 20 MG tablet Take 20 mg by mouth 2 times daily 2/24/22   Historical Provider, MD   furosemide (LASIX) 40 MG tablet Take 40 mg by mouth daily 3/14/22   Historical Provider, MD   hydrALAZINE (APRESOLINE) 10 MG tablet Take 10 mg by mouth 3 times daily 4/19/22   Historical Provider, MD   insulin lispro, 1 Unit Dial, 100 UNIT/ML SOPN Per s/s 2/24/22   Historical Provider, MD   isosorbide dinitrate (ISORDIL) 10 MG tablet Take 10 mg by mouth 3 times daily 4/19/22   Historical Provider, MD   magnesium oxide (MAG-OX) 400 (240 Mg) MG tablet Take 200 mg by mouth daily 3/31/22   Historical Provider, MD   metoprolol succinate (TOPROL XL) 25 MG extended release tablet Take 25 mg by mouth daily 9/30/21   Historical Provider, MD   QUEtiapine (SEROQUEL) 25 MG tablet Take 25 mg by mouth 2/24/22   Historical Provider, MD   atorvastatin (LIPITOR) 80 MG tablet Take 80 mg by mouth daily    Historical Provider, MD   doxycycline hyclate (VIBRA-TABS) 100 MG tablet Take 1 tablet by mouth 2 times daily for 10 days 4/29/22 5/9/22  Fidel Pennington MD   polyethylene glycol (GLYCOLAX) 17 GM/SCOOP powder Take 17 g by mouth daily as needed (CONSTIPATION) 4/29/22 5/29/22  Fidel Pennington MD   GLIPIZIDE PO Take 5 mg by mouth daily     Historical Provider, MD   metFORMIN (GLUCOPHAGE) 850 MG tablet Take 850 mg by mouth 2 times daily (with meals)     Historical Provider, MD       Social History     Tobacco Use    Smoking status: Former Smoker     Packs/day: 1.50     Types: Cigarettes    Smokeless tobacco: Never Used   Substance Use Topics    Alcohol use: No       ROS: All 14 ROS systems reviewed and pertinent positives noted above, all others negative. Lower Extremity Physical Examination:     Vitals:   Vitals:    05/09/22 1400   BP: 124/60   Pulse: 64   Resp: 20   Temp: 96.6 °F (35.9 °C)     General: AAO x 3 in NAD.      Vascular: DP and PT pulses palpable 1/4, bilateral.  CFT >5 seconds, bilateral.  Hair growth absent to the level of the digits, bilateral.  Edema present, bilateral.  Varicosities present, bilateral. Erythema absent, bilateral. Distal Rubor present bilateral toes. Temperature decreased bilateral. Hyperpigmentation present bilateral. Atrophic skin yes. Neurological: Sensation intact to light touch to level of digits, bilateral.  Protective sensation intact 10/10 sites via 5.07/10g [place]Push[place] Monofilament, bilateral.  negative Tinel's, bilateral.  negative Valleix sign, bilateral.  Vibratory intact bilateral.  Reflexes Decreased bilateral.  Paresthesias negative. Dysthesias negative. Sharp/dull intact bilateral.    Musculoskeletal: Muscle strength 5/5, bilateral.  Pain present upon palpation R hallux. Normal medial longitudinal arch, bilateral.  Ankle ROM decreased,bilateral.  1st MPJ ROM within normal limits, bilateral.  Dorsally contracted digits absent. No other foot deformities. Integument:   Open lesion present, Right. Ulcer medial right hallux with positive fibrin eschar formation. Very stable, There is no red granular tissue whatsoever. No undermining no probing no malodor no signs infection on periphery no proximal streaking. Interdigital maceration absent to web spaces , Bilateral.  Nails b/l thickened, dystrophic and crumbly, discolored with subungual debris. Fissures absent, Bilateral. Hyperkeratotic tissue is absent. CAPRI: see report in chart    Asessment: Patient is a 68 y.o. male with:    Diagnosis Orders   1. Skin ulcer of right great toe with fat layer exposed ([address]     2. PAD (peripheral artery disease) (HCC)  VL LOWER EXTREMITY ARTERIAL SEGMENTAL PRESSURES W PPG   3.  Uncontrolled type 2 DM with peripheral circulatory disorder [facility]       Hospital Problems           Last Modified POA    Uncontrolled type 2 DM with peripheral circulatory disorder ([address] 5/9/2022 Yes    PAD (peripheral artery disease) ([address] 5/9/2022 Yes    Skin ulcer of right great toe, limited to breakdown of skin ([address] 5/9/2022 Yes          Ulcer Classification:  Diabetic ulcer grade 2 C. IDSA  no infection. Plan:   Patient examined and evaluated. Diabetic foot education and exam.  Current condition and treatment options discussed in detail. Topical lidocaine applied to wound. Debridement none needed today. Today's dressing:betadine bid  Patient had the CAPRI/PVR test reviewed with them in detail. CAPRI was decreased bilateral 0.5 area. The great toe in the area of the wound right side was 0.18 TBI. Waveforms were decreased Bilateral . Digital waveforms were decreased left, right. Patient referred to vascular surgery. Seeing them and document 2 weeks. DM foot ed and exam  Continue offloading with surgical shoe. Further recs post testing. Patient is low level medical decision making. Patient is acute pelvic condition along with chronic condition with exacerbation. Patient 1 test reviewed from the provider. Low risk morbidity with current treatment course since ulcer is stable not infected. Contact clinic with any questions/problems/concerns or new signs of infection. Follow-up at [facility] in 1 week(s).

## 2022-05-13 ENCOUNTER — OFFICE VISIT (OUTPATIENT)
Dept: NEPHROLOGY | Age: 77
End: 2022-05-13
Payer: MEDICARE

## 2022-05-13 VITALS
HEIGHT: 70 IN | DIASTOLIC BLOOD PRESSURE: 68 MMHG | WEIGHT: 210 LBS | HEART RATE: 68 BPM | SYSTOLIC BLOOD PRESSURE: 116 MMHG | BODY MASS INDEX: 30.06 KG/M2

## 2022-05-13 DIAGNOSIS — I25.5 ISCHEMIC CARDIOMYOPATHY: ICD-10-CM

## 2022-05-13 DIAGNOSIS — I10 HYPERTENSION, ESSENTIAL: ICD-10-CM

## 2022-05-13 DIAGNOSIS — R60.0 BILATERAL LOWER EXTREMITY EDEMA: ICD-10-CM

## 2022-05-13 DIAGNOSIS — N18.32 STAGE 3B CHRONIC KIDNEY DISEASE (HCC): Primary | ICD-10-CM

## 2022-05-13 DIAGNOSIS — E55.9 VITAMIN D DEFICIENCY: ICD-10-CM

## 2022-05-13 DIAGNOSIS — E11.22 TYPE 2 DIABETES MELLITUS WITH STAGE 3B CHRONIC KIDNEY DISEASE, WITHOUT LONG-TERM CURRENT USE OF INSULIN (HCC): ICD-10-CM

## 2022-05-13 DIAGNOSIS — N18.32 TYPE 2 DIABETES MELLITUS WITH STAGE 3B CHRONIC KIDNEY DISEASE, WITHOUT LONG-TERM CURRENT USE OF INSULIN (HCC): ICD-10-CM

## 2022-05-13 DIAGNOSIS — E83.42 HYPOMAGNESEMIA: ICD-10-CM

## 2022-05-13 PROCEDURE — G8417 CALC BMI ABV UP PARAM F/U: HCPCS | Performed by: INTERNAL MEDICINE

## 2022-05-13 PROCEDURE — 4040F PNEUMOC VAC/ADMIN/RCVD: CPT | Performed by: INTERNAL MEDICINE

## 2022-05-13 PROCEDURE — 99214 OFFICE O/P EST MOD 30 MIN: CPT | Performed by: INTERNAL MEDICINE

## 2022-05-13 PROCEDURE — G8427 DOCREV CUR MEDS BY ELIG CLIN: HCPCS | Performed by: INTERNAL MEDICINE

## 2022-05-13 PROCEDURE — 99213 OFFICE O/P EST LOW 20 MIN: CPT | Performed by: INTERNAL MEDICINE

## 2022-05-13 PROCEDURE — 1123F ACP DISCUSS/DSCN MKR DOCD: CPT | Performed by: INTERNAL MEDICINE

## 2022-05-13 PROCEDURE — 1036F TOBACCO NON-USER: CPT | Performed by: INTERNAL MEDICINE

## 2022-05-13 RX ORDER — FUROSEMIDE 40 MG/1
40 TABLET ORAL 2 TIMES DAILY
Qty: 180 TABLET | Refills: 3 | Status: SHIPPED | OUTPATIENT
Start: 2022-05-13

## 2022-05-13 NOTE — PROGRESS NOTES
Nephrology Progress Note    Gold Brooks MD      SUBJECTIVE      Chief Complaint   Patient presents with    Follow-Up from Hospital     post hospital follow up LAUREN       5/13/2022:  Patient is here as a hospital follow-up. He was admitted to [facility] in April 2022, went to the hospital due to symptoms of black tarry stools and found to have GI bleed requiring blood transfusion. Patient did have colonoscopy done 04/12/2022 status post cold forceps, cold snare, heart near polypectomy due to melena. Found to have polyps and internal hemorrhoids. He also suffered acute kidney injury secondary ischemic ATN from intravascular volume depletion related to blood loss anemia requiring blood transfusion along with hypotension and low flow state. Baseline creatinine prior to open-heart surgery about 2 months ago was 1-1.1 subsequently creatinine on discharge was 1.5 on 02/16/2022 and more recently about 10 days ago per patient his [address] had told him that he has developed kidney disease. From the [place] records it looks like his creatinine was 2.28 mg/dL on 03/31/2022 and before that on 09/20/2021 it was 1.0 and previously 0.85 mg/dL in 8/2021. Creatinine on admission was 3.43 mg/dL, and then seem to have plateaued around 6.3II/LS. His creatinine on discharge was 2.20 mg/dl (4/15/2022). Now most recent creatinine was 2.12 mg/dl on 4/29/2022. Patient also has history of CKD 3 likely due to diabetic and hypertensive nephrosclerosis and possibly complicated by atheroembolic disease from cardiac catheterization 02/09/2022 with baseline creatinine now seems to be around 2 to 2.3 mg/DL, diabetes type 2, hypertension, ischemic cardiomyopathy with EF of around 20 to 25% as per 2D echo done on 2/14/2022, coronary artery disease with history of CABG, atrial fibrillation, hypomagnesemia. Serology 04/10/2022: JOB negative.  SPEP and immunofixation- Presence of monoclonal protein is unclear at this time. Suggest repeat in 3 to 6 months if clinically indicated. C3 71.1 and C 4 29.4. K/L 1.44. UPC 0.3. Urine eosinophiles negative. Renal ultrasound 04/11/2022: Right kidney 10.3 cm and left kidney 12.6 cm. Impression: Normal renal ultrasound. Cholelithiasis. Patient also has diabetic foot wound right toe and now follows up with wound care and podiatry. Patient doing well. No new issues. No fever, no chills, no CP, no SOB, no N/V/D, no abdomen pain, no urinary complaints, +ve LE edema and right tow wound. He is currently on Lasix 40 mg daily, hydralazine 10 mg 3 times a day, Isordil 10 mg 3 times a day, metoprolol XL 25 mg daily, vitamin D 50,000 units weekly, magnesium oxide 200 mg daily. Last labs 4/29/2020: BUN/Cr 25/2.12, Na 137, K 4.0, Cl 101, Bicarb 25, Ca 8.8, Hb 8.0.  BP today 116/68, HR 68. History of present illness from initial hospital visit on 4/10/2022:  \"Known history of coronary artery disease history of coronary bypass graft 02/09/2022. Prior to that underwent cardiac catheterization 02/08/2022 for workup of cardiomyopathy (EF 20-25%) and was found to have multivessel disease. His creatinine prior to the procedure was in the 1.0-1.1 range. Post open heart surgery and on discharge on 16th February 2022 his creatinine was 1.5. Subsequently about 2 weeks ago he was seen by his [address] and was told that his creatinine was high, I do not have the exact lab results available to me. He also has known history of type 2 diabetes, previous history of CVA, persistent smoking and hypertension. Patient has been on dual antiplatelet agents, also has been on metformin and Lasix. He now comes into the hospital with 2-3 day history of noticing black stools. Initially symptoms began 04/07/2022, patient and his wife did note them subsequently on 04/09/2022 with bleeding was much more severe and the melanotic stools were much more significant and associated with diarrhea.  [facility]. Labs showed that he had a hemoglobin of 8.5 which is a drop from hemoglobin of 10.5 earlier. Subsequently it dropped down to 7.1. In addition he was hypotensive, with systolic pressure in the  range. Labs also showed that he had a creatinine of 3.5. Was then transferred to [facility], is being transfused, nephrology consulted for acute kidney injury. At the time evaluation patient denies any complaints. No shortness of breath, chest pain, PND, orthopnea. No cough phlegm hemoptysis. No fever chills. No nausea vomiting. Denies any digital infarcts, discoloration in his toes. No uncontrolled blood sugars no history of NSAID use. Labs this morning showed a creatinine of 3.1 potassium 4.8 bicarb 17 anion gap 11 calcium 7.9  Home medicines were reviewed, he has not had any Ace inhibitors does take loop diuretics. \"      Patient Active Problem List    Diagnosis Date Noted    Uncontrolled type 2 DM with peripheral circulatory disorder ([facility] 05/09/2022     Priority: Medium    PAD (peripheral artery disease) [facility] 05/09/2022     Priority: Medium    Skin ulcer of right great toe, limited to breakdown of skin ([facility] 05/09/2022     Priority: Medium    CKD (chronic kidney disease) stage 4, GFR 15-29 ml/min ([facility]) 04/29/2022     Priority: Medium    Type 2 diabetes mellitus with complication ([facility])     Tobacco abuse          CURRENT MEDICATIONS      Current Outpatient Medications   Medication Sig Dispense Refill    Multiple Vitamins-Minerals (PRESERVISION AREDS 2 PO) Take 1 tablet by mouth 2 times daily      acetaminophen (TYLENOL) 325 MG tablet Take 650 mg by mouth every 6 hours as needed      albuterol (PROVENTIL) (2.5 MG/3ML) 0.083% nebulizer solution Inhale 2.5 mg into the lungs every 6 hours as needed      amiodarone (CORDARONE) 200 MG tablet Take 200 mg by mouth daily      apixaban (ELIQUIS) 5 MG TABS tablet Take 5 mg by mouth 2 times daily      aspirin 81 MG EC tablet Take 81 mg by mouth daily      atorvastatin (LIPITOR) 40 MG tablet Take 40 mg by mouth nightly       bisacodyl (DULCOLAX) 10 MG suppository Place 10 mg rectally daily      ergocalciferol (ERGOCALCIFEROL) 1.25 MG (18025 UT) capsule TAKE ONE CAPSULE BY MOUTH ONCE EVERY WEEK FOR VITAMIN (D) DEFICIENCY      famotidine (PEPCID) 20 MG tablet Take 20 mg by mouth 2 times daily      furosemide (LASIX) 40 MG tablet Take 40 mg by mouth daily      hydrALAZINE (APRESOLINE) 10 MG tablet Take 10 mg by mouth 3 times daily      insulin lispro, 1 Unit Dial, 100 UNIT/ML SOPN Per s/s      isosorbide dinitrate (ISORDIL) 10 MG tablet Take 10 mg by mouth 3 times daily      magnesium oxide (MAG-OX) 400 (240 Mg) MG tablet Take 200 mg by mouth daily      metoprolol succinate (TOPROL XL) 25 MG extended release tablet Take 25 mg by mouth daily      QUEtiapine (SEROQUEL) 25 MG tablet Take 25 mg by mouth      atorvastatin (LIPITOR) 80 MG tablet Take 80 mg by mouth daily      polyethylene glycol (GLYCOLAX) 17 GM/SCOOP powder Take 17 g by mouth daily as needed (CONSTIPATION) 510 g 0    GLIPIZIDE PO Take 5 mg by mouth daily       metFORMIN (GLUCOPHAGE) 850 MG tablet Take 850 mg by mouth 2 times daily (with meals)        No current facility-administered medications for this visit. REVIEW OF SYSTEMS     Constitutional: No fever, no chills, no lethargy, no weakness. HEENT:  No headache, otalgia, itchy eyes, nasal discharge or sore throat. Cardiac:  No chest pain, dyspnea, orthopnea or PND. Chest:   No cough, phlegm or wheezing or hemoptysis . Abdomen:  No abdominal pain, nausea or vomiting. Neuro:  No focal weakness, abnormal movements or seizure like activity. Skin:   No rashes, no itching. :   No hematuria, no pyuria, no dysuria, no flank pain. Extremities:  +ve b/L swelling or joint pains. ROS was otherwise negative except as mentioned in the [address]      OBJECTIVE      Vitals:    05/13/22 1130   BP: 116/68   Pulse: 68     Wt Readings from Last 2 Encounters:   05/13/22 210 lb (95.3 kg)   04/29/22 203 lb (92.1 kg)     Body mass index is 30.13 kg/m². PHYSICAL EXAM      GENERAL APPEARANCE: awake, alert, in no acute distress  SKIN: warm and dry, no rash or erythema  EYES: conjunctivae normal and sclera anicteric  ENT: no thrush no pharyngeal congestion   NECK: supple    PULMONARY: clear to auscultation and no wheezing noted   CADRDIOVASCULAR: normal S1 & S2, no murmur, +ve life vest   ABDOMEN: soft nontender, bowel sounds, present, no organomegaly, no ascites   EXTREMITIES: no cyanosis, clubbing 2+ b/L edema, Right foot toe wound- dressing in place. NEURO: alert and oriented, no deficits    LABS    CBC:   Lab Results   Component Value Date    WBC 6.4 04/29/2022    HGB 8.0 04/29/2022    HCT 25.7 04/29/2022    MCV 92.8 04/29/2022    RDW 15.8 04/29/2022     04/29/2022    MPV 10.6 04/29/2022      BMP:   Lab Results   Component Value Date     04/29/2022    K 4.0 04/29/2022     04/29/2022    CO2 25 04/29/2022    BUN 25 04/29/2022    CREATININE 2.12 04/29/2022    GLUCOSE 74 04/29/2022    CALCIUM 8.8 04/29/2022      PHOSPHORUS:  No results found for: PHOS  MAGNESIUM:   Lab Results   Component Value Date    MG 2.0 04/22/2022     ALBUMIN: No results found for: LABALBU  PTH: No components found for: PTHINTACT  VIT D3,25 HYDROXY: No results found for: VITD3     IRON:  No results found for: IRON  IRON SATURATION:  No results found for: LABIRON  TIBC:  No results found for: TIBC  FERRITIN:  No results found for: FERRITIN          JOB: No results found for: JOB    SPEP: No results found for: PROT, ALBCAL, ALBCAL, ALBPCT, LABALPH, A1PCT, LABALPH, A2PCT, LABBETA, BETAPCT, GAMGLOB, GGPCT, PATH  UPEP: No results found for: TPU   HEPBSAG:No results found for: HEPBSAG  HEPCAB:No results found for: HEPCAB  C3: No results found for: C3  C4: No results found for: C4  MPO ANCA: No results found for: MPO .   PR3 ANCA:  No results found for: PR3                   URINALYSIS/URINE CHEMISTRIES      URINE CREATININE:  No results found for: LABCREA  URINE EOSINOPHILS : No results found for: UREO  URINE PROTEIN:  No results found for: TPU        RADIOLOGY    No results found for this or any previous visit. ASSESSMENT     1. CKD 3 likely due to diabetic and hypertensive nephrosclerosis and possibly complicated by atheroembolic disease from cardiac catheterization 02/09/2022 with baseline creatinine now seems to be around 2 to 2.3 mg/DL. Last creatinine was 2.12 mg/DL on 4/29/2022. Serology 04/10/2022: JOB negative. SPEP and immunofixation- Presence of monoclonal protein is unclear at this time. Suggest repeat in 3 to 6 months if clinically indicated. C3 71.1 and C 4 29.4. K/L 1.44. UPC 0.3. Urine eosinophiles negative. Renal ultrasound 04/11/2022: Right kidney 10.3 cm and left kidney 12.6 cm. Impression: Normal renal ultrasound. Cholelithiasis. 2. Hypertension, long term. BP Readings from Last 3 Encounters:   05/13/22 116/68   05/09/22 124/60   05/05/22 118/60    :  3. Diabetes type 2, long term. 4.  Ischemic cardiomyopathy with a EF of around 20 to 25% as per 2D echo done on 2/14/2022. Has life vest on and follows up with [address] [facility]. 5.  Coronary artery disease with history of CABG. 6.  Atrial fibrillation. 7.  Hypomagnesemia. 8.  Vitamin D deficiency. 9.  Bilateral lower extremity edema. PLAN     1. Based on available labs patients renal function is stable. Patient's volume status is  acceptable. Importance of tight blood pressure, glycemic control, lipid control was outlined to patient . 2.  Will change Lasix to 40 mg BID. 3.  Cont hydralazine 10 mg 3 times a day, Isordil 10 mg 3 times a day, metoprolol XL 25 mg daily. 4.  BMP in 1 week. 5.  Continue vitamin D 50,000 units weekly for now. 6.  Continue magnesium oxide 200 mg daily. 7.  Keep well-hydrated.   8.  Follow up labs ordered : bmp, cbc, mg, phos, intact pth, vitaminD 22 [place]. 9. Urine for random protein and creat to be done. 10.  Will send for repeat SPEP and immunofixation check next visit. 11.  If anemia persists, will refer to hematology oncology. 12. Cont to follow up with Cardio, and will be getting an ACID soon. 13. Follow up in the office in 4 months. Patient was asked to avoid NSAIDS. Please do not hesitate to call with questions. Electronically signed by Lan Nelson MD on 5/13/2022 at 11:35 AM  [facility]     This note is created with the assistance of a speech-recognition program. While intending to generate a document that actually reflects the content of the visit, no guarantees can be provided that every mistake has been identified and corrected by editing.

## 2022-05-16 ENCOUNTER — HOSPITAL ENCOUNTER (OUTPATIENT)
Dept: WOUND CARE | Age: 77
Discharge: HOME | End: 2022-05-17
Payer: MEDICARE

## 2022-05-16 VITALS
TEMPERATURE: 96.5 F | DIASTOLIC BLOOD PRESSURE: 60 MMHG | SYSTOLIC BLOOD PRESSURE: 118 MMHG | HEART RATE: 64 BPM | RESPIRATION RATE: 18 BRPM | BODY MASS INDEX: 30.13 KG/M2 | HEIGHT: 70 IN

## 2022-05-16 PROCEDURE — 99213 OFFICE O/P EST LOW 20 MIN: CPT | Performed by: PODIATRIST

## 2022-05-16 NOTE — PLAN OF CARE
Problem: Chronic Conditions and Co-morbidities  Goal: Patient's chronic conditions and co-morbidity symptoms are monitored and maintained or improved  Outcome: Progressing     Problem: Cognitive:  Goal: Knowledge of wound care  Description: Knowledge of wound care  Outcome: Progressing  Goal: Understands risk factors for wounds  Description: Understands risk factors for wounds  Outcome: Progressing     Problem: Wound:  Goal: Will show signs of wound healing; wound closure and no evidence of infection  Description: Will show signs of wound healing; wound closure and no evidence of infection  Outcome: Progressing     Problem: Pressure Ulcer:  Goal: Signs of wound healing will improve  Description: Signs of wound healing will improve  Outcome: Progressing  Goal: Absence of new pressure ulcer  Description: Absence of new pressure ulcer  Outcome: Progressing  Goal: Will show no infection signs and symptoms  Description: Will show no infection signs and symptoms  Outcome: Progressing     Problem: Arterial:  Goal: Optimize blood flow for wound healing  Description: Optimize blood flow for wound healing  Outcome: Progressing     Problem: Venous:  Goal: Signs of wound healing will improve  Description: Signs of wound healing will improve  Outcome: Progressing     Problem: Smoking cessation:  Goal: Ability to formulate a plan to maintain a tobacco-free life will be supported  Description: Ability to formulate a plan to maintain a tobacco-free life will be supported  Outcome: Progressing     Problem: Compression therapy:  Goal: Will be free from complications associated with compression therapy  Description: Will be free from complications associated with compression therapy  Outcome: Progressing     Problem: Weight control:  Goal: Ability to maintain an optimal weight for height and age will be supported  Description: Ability to maintain an optimal weight for height and age will be supported  Outcome: Progressing Problem: Falls - Risk of:  Goal: Will remain free from falls  Description: Will remain free from falls  Outcome: Progressing     Problem: Blood Glucose:  Goal: Ability to maintain appropriate glucose levels will improve  Description: Ability to maintain appropriate glucose levels will improve  Outcome: Progressing

## 2022-05-16 NOTE — PROGRESS NOTES
UT) capsule TAKE ONE CAPSULE BY MOUTH ONCE EVERY WEEK FOR VITAMIN (D) DEFICIENCY 10/12/21   Historical Provider, MD   famotidine (PEPCID) 20 MG tablet Take 20 mg by mouth 2 times daily 2/24/22   Historical Provider, MD   hydrALAZINE (APRESOLINE) 10 MG tablet Take 10 mg by mouth 3 times daily 4/19/22   Historical Provider, MD   insulin lispro, 1 Unit Dial, 100 UNIT/ML SOPN Per s/s 2/24/22   Historical Provider, MD   isosorbide dinitrate (ISORDIL) 10 MG tablet Take 10 mg by mouth 3 times daily 4/19/22   Historical Provider, MD   magnesium oxide (MAG-OX) 400 (240 Mg) MG tablet Take 200 mg by mouth daily 3/31/22   Historical Provider, MD   metoprolol succinate (TOPROL XL) 25 MG extended release tablet Take 25 mg by mouth daily 9/30/21   Historical Provider, MD   QUEtiapine (SEROQUEL) 25 MG tablet Take 25 mg by mouth 2/24/22   Historical Provider, MD   atorvastatin (LIPITOR) 80 MG tablet Take 80 mg by mouth daily    Historical Provider, MD   polyethylene glycol (GLYCOLAX) 17 GM/SCOOP powder Take 17 g by mouth daily as needed (CONSTIPATION) 4/29/22 5/29/22  Ajit Pedro MD   GLIPIZIDE PO Take 5 mg by mouth daily     Historical Provider, MD   metFORMIN (GLUCOPHAGE) 850 MG tablet Take 850 mg by mouth 2 times daily (with meals)     Historical Provider, MD       Social History     Tobacco Use    Smoking status: Former Smoker     Packs/day: 1.50     Types: Cigarettes    Smokeless tobacco: Never Used   Substance Use Topics    Alcohol use: No       ROS: All 14 ROS systems reviewed and pertinent positives noted above, all others negative. Lower Extremity Physical Examination:     Vitals:   Vitals:    05/16/22 1405   BP: 118/60   Pulse: 64   Resp: 18   Temp: 96.5 °F (35.8 °C)     General: AAO x 3 in NAD.      Vascular: DP and PT pulses palpable 1/4, bilateral.  CFT >5 seconds, bilateral.  Hair growth absent to the level of the digits, bilateral.  Edema present, bilateral.  Varicosities present, bilateral. Erythema absent, bilateral. Distal Rubor present bilateral toes. Temperature decreased bilateral. Hyperpigmentation present bilateral. Atrophic skin yes. Neurological: Sensation intact to light touch to level of digits, bilateral.  Protective sensation intact 10/10 sites via 5.07/10g [place]-Pushpa Monofilament, bilateral.  negative Tinel's, bilateral.  negative Valleix sign, bilateral.  Vibratory intact bilateral.  Reflexes Decreased bilateral.  Paresthesias negative. Dysthesias negative. Sharp/dull intact bilateral.    Musculoskeletal: Muscle strength 5/5, bilateral.  Pain present upon palpation R hallux. Normal medial longitudinal arch, bilateral.  Ankle ROM decreased,bilateral.  1st MPJ ROM within normal limits, bilateral.  Dorsally contracted digits absent. No other foot deformities. Integument:   Open lesion present, Right. Ulcer medial right hallux with positive fibrin eschar formation. Very stable, There is no red granular tissue whatsoever. No undermining no probing no malodor no signs infection on periphery no proximal streaking. Interdigital maceration absent to web spaces , Bilateral.  Nails b/l thickened, dystrophic and crumbly, discolored with subungual debris. Fissures absent, Bilateral. Hyperkeratotic tissue is absent. Asessment: Patient is a 68 y.o. male with:   Hospital Problems           Last Modified POA    Uncontrolled type 2 DM with peripheral circulatory disorder ([address] 5/16/2022 Yes    PAD (peripheral artery disease) ([facility] 5/16/2022 Yes    Skin ulcer of right great toe, limited to breakdown of skin ([address] 5/16/2022 Yes          Ulcer Classification:  Diabetic ulcer grade 2 C. IDSA  no infection. Plan:   Patient examined and evaluated. Diabetic foot education and exam.  Current condition and treatment options discussed in detail. Topical lidocaine applied to wound. Debridement none needed today.    Today's dressing:betadine bid  DM foot ed and exam  Continue offloading with surgical shoe. Patient sees vascular surgery near future. Patient is low level medical decision making. Patient is acute pelvic condition along with chronic condition with exacerbation. Patient 1 test reviewed from the provider. Low risk morbidity with current treatment course since ulcer is stable not infected. Contact clinic with any questions/problems/concerns or new signs of infection. Follow-up at [facility] in 1 week(s).

## 2022-05-23 ENCOUNTER — HOSPITAL ENCOUNTER (OUTPATIENT)
Dept: LAB | Age: 77
Discharge: HOME | End: 2022-05-23
Payer: OTHER GOVERNMENT

## 2022-05-23 ENCOUNTER — TELEPHONE (OUTPATIENT)
Dept: NEPHROLOGY | Age: 77
End: 2022-05-23

## 2022-05-23 DIAGNOSIS — N18.32 STAGE 3B CHRONIC KIDNEY DISEASE (HCC): ICD-10-CM

## 2022-05-23 LAB
ANION GAP SERPL CALCULATED.3IONS-SCNC: 12 MMOL/L (ref 9–17)
BUN BLDV-MCNC: 28 MG/DL (ref 8–23)
BUN/CREAT BLD: 13 (ref 9–20)
CALCIUM SERPL-MCNC: 9.4 MG/DL (ref 8.6–10.4)
CHLORIDE BLD-SCNC: 101 MMOL/L (ref 98–107)
CO2: 26 MMOL/L (ref 20–31)
CREAT SERPL-MCNC: 2.2 MG/DL (ref 0.7–1.2)
GFR AFRICAN AMERICAN: 35 ML/MIN
GFR NON-AFRICAN AMERICAN: 29 ML/MIN
GFR SERPL CREATININE-BSD FRML MDRD: ABNORMAL ML/MIN/{1.73_M2}
GLUCOSE BLD-MCNC: 49 MG/DL (ref 70–99)
POTASSIUM SERPL-SCNC: 3.7 MMOL/L (ref 3.7–5.3)
SODIUM BLD-SCNC: 139 MMOL/L (ref 135–144)

## 2022-05-23 PROCEDURE — 36415 COLL VENOUS BLD VENIPUNCTURE: CPT

## 2022-05-23 PROCEDURE — 80048 BASIC METABOLIC PNL TOTAL CA: CPT

## 2022-05-23 NOTE — TELEPHONE ENCOUNTER
Lab called with critical glucose of 49 for patient. Results read back to  Writer contacted patient's wife to ask if he had breakfast and she stated he ate very little before his lab test. She stated he had a big breakfast after his labwork so writer asked wife to recheck his blood sugar and to call back. Wife called back with a reading of 188. Writer told patients wife to check it again in the morning and if it's low to contact PCP. Wife voiced understanding.

## 2022-05-26 ENCOUNTER — OFFICE VISIT (OUTPATIENT)
Dept: VASCULAR SURGERY | Age: 77
End: 2022-05-26
Payer: MEDICARE

## 2022-05-26 VITALS
HEART RATE: 60 BPM | WEIGHT: 210.5 LBS | DIASTOLIC BLOOD PRESSURE: 62 MMHG | OXYGEN SATURATION: 95 % | BODY MASS INDEX: 30.2 KG/M2 | SYSTOLIC BLOOD PRESSURE: 120 MMHG

## 2022-05-26 DIAGNOSIS — I70.213 ATHEROSCLEROSIS OF NATIVE ARTERIES OF EXTREMITIES WITH INTERMITTENT CLAUDICATION, BILATERAL LEGS (HCC): ICD-10-CM

## 2022-05-26 PROCEDURE — 1124F ACP DISCUSS-NO DSCNMKR DOCD: CPT | Performed by: SURGERY

## 2022-05-26 PROCEDURE — 99214 OFFICE O/P EST MOD 30 MIN: CPT | Performed by: SURGERY

## 2022-05-26 PROCEDURE — G8427 DOCREV CUR MEDS BY ELIG CLIN: HCPCS | Performed by: SURGERY

## 2022-05-26 PROCEDURE — G8417 CALC BMI ABV UP PARAM F/U: HCPCS | Performed by: SURGERY

## 2022-05-26 PROCEDURE — 1036F TOBACCO NON-USER: CPT | Performed by: SURGERY

## 2022-05-26 PROCEDURE — 99204 OFFICE O/P NEW MOD 45 MIN: CPT | Performed by: SURGERY

## 2022-05-26 NOTE — PROGRESS NOTES
[address] [facility] KAJAL  [address] [place] [id number]  [address] [place] [address]  1945  68 y. o.male       Chief Complaint:  Chief Complaint   Patient presents with    New Patient     abn capri, foot doctor cut into pts skin  when providing nail care        History of present Illness:  Pt is here today for evaluation and treatment of peripheral arterial disease. Patient is a 59-year-old male who has a open ulceration on the right great toe for the past 6 months. It appears that this happened spontaneously but since that time he suffered a stroke which was treated  COVID-19 positive test (U07.1, COVID-19) with Acute Respiratory Distress Syndrome (ARDS) (J80, ARDS)  (If respiratory failure or sepsis present, add as separate assessment)    Thrombolytic therapy. He also had emergent open heart surgery. Over the lawson this time he continues to have an open ulcer. I reviewed his CAPRI study which shows moderate disease of an CAPRI in the 0.65 range on the right and 0.5 range on the left. Certainly I think his peripheral arterial disease is affecting his ability to heal this wound. We spent time discussing the role of arteriogram and revascularization. Both he and his wife are agreeable to having this procedure to help heal this wound. Past Medical History:  has a past medical history of Cerebrovascular accident (CVA) due to embolism of cerebral artery ([address] CKD (chronic kidney disease) stage 4, GFR 15-29 ml/min ([address] Coronary artery disease involving coronary bypass graft of native heart without angina pectoris, H/O lower gastrointestinal bleeding, Tobacco abuse, and Type II or unspecified type diabetes mellitus without mention of complication, not stated as uncontrolled. Past Surgical History: No past surgical history on file. Social History:  reports that he has quit smoking. His smoking use included cigarettes.  He smoked 1.50 packs per day. He has never used smokeless tobacco. He reports that he does not drink alcohol and does not use drugs. Family History: family history is not on file.     Review of Systems:   Constitutional:  negative for  fevers, chills, sweats, fatigue, and weight loss  HEENT:  negative for vision or hearing changes,   Respiratory:  negative for shortness of breath, cough, or congestion  Cardiovascular:  negative for  chest pain, palpitations  Gastrointestinal:  negative for nausea, vomiting, diarrhea, constipation, abdominal pain  Genitourinary:  negative for frequency, dysuria  Integument/Breast:  negative for rash, skin lesions  Musculoskeletal:  negative for muscle aches or joint pain  Neurological:  negative for headaches, dizziness, lightheadedness, numbness, pain and tingling extremities  Behavior/Psych:  negative for depression and anxiety    Allergies: Simvastatin    Current Meds:  Current Outpatient Medications:     furosemide (LASIX) 40 MG tablet, Take 1 tablet by mouth 2 times daily, Disp: 180 tablet, Rfl: 3    Multiple Vitamins-Minerals (PRESERVISION AREDS 2 PO), Take 1 tablet by mouth 2 times daily, Disp: , Rfl:     acetaminophen (TYLENOL) 325 MG tablet, Take 650 mg by mouth every 6 hours as needed, Disp: , Rfl:     albuterol (PROVENTIL) (2.5 MG/3ML) 0.083% nebulizer solution, Inhale 2.5 mg into the lungs every 6 hours as needed, Disp: , Rfl:     amiodarone (CORDARONE) 200 MG tablet, Take 200 mg by mouth daily, Disp: , Rfl:     apixaban (ELIQUIS) 5 MG TABS tablet, Take 5 mg by mouth 2 times daily, Disp: , Rfl:     aspirin 81 MG EC tablet, Take 81 mg by mouth daily, Disp: , Rfl:     atorvastatin (LIPITOR) 40 MG tablet, Take 40 mg by mouth nightly , Disp: , Rfl:     bisacodyl (DULCOLAX) 10 MG suppository, Place 10 mg rectally daily, Disp: , Rfl:     ergocalciferol (ERGOCALCIFEROL) 1.25 MG (18843 UT) capsule, TAKE ONE CAPSULE BY MOUTH ONCE EVERY WEEK FOR VITAMIN (D) DEFICIENCY, Disp: , Rfl:   famotidine (PEPCID) 20 MG tablet, Take 20 mg by mouth 2 times daily, Disp: , Rfl:     hydrALAZINE (APRESOLINE) 10 MG tablet, Take 10 mg by mouth 3 times daily, Disp: , Rfl:     insulin lispro, 1 Unit Dial, 100 UNIT/ML SOPN, Per s/s, Disp: , Rfl:     isosorbide dinitrate (ISORDIL) 10 MG tablet, Take 10 mg by mouth 3 times daily, Disp: , Rfl:     magnesium oxide (MAG-OX) 400 (240 Mg) MG tablet, Take 200 mg by mouth daily, Disp: , Rfl:     metoprolol succinate (TOPROL XL) 25 MG extended release tablet, Take 25 mg by mouth daily, Disp: , Rfl:     QUEtiapine (SEROQUEL) 25 MG tablet, Take 25 mg by mouth, Disp: , Rfl:     atorvastatin (LIPITOR) 80 MG tablet, Take 80 mg by mouth daily, Disp: , Rfl:     polyethylene glycol (GLYCOLAX) 17 GM/SCOOP powder, Take 17 g by mouth daily as needed (CONSTIPATION), Disp: 510 g, Rfl: 0    GLIPIZIDE PO, Take 5 mg by mouth daily , Disp: , Rfl:     metFORMIN (GLUCOPHAGE) 850 MG tablet, Take 850 mg by mouth 2 times daily (with meals) , Disp: , Rfl:     Vital Signs: There were no vitals filed for this visit.     Physical Exam:  General appearance - alert, well appearing and in no acute distress  Mental status - oriented to person, place and time with normal affect  Head - normocephalic and atraumatic  Eyes - pupils equal and reactive, extraocular eye movements intact, conjunctiva clear  Ears - hearing appears to be intact  Nose - no drainage noted  Mouth - mucous membranes moist  Neck - supple, no carotid bruits, thyroid not palpable, no JVD  Chest - clear to auscultation, normal effort  Heart - normal rate, regular rhythm, no murmurs  Abdomen - soft, non-tender, non-distended, bowel sounds present all four quadrants, no masses, hepatomegaly, splenomegaly or aortic enlargement  Neurological - normal speech, no focal findings or movement disorder noted, cranial nerves II through XII grossly intact  Extremities -right great toe shows a full-thickness wound with fatty layer exposed. No surrounding erythema. There is mild peripheral edema. R brachial 2+ L brachial 2+   R radial 2+ L radial 2+   R femoral 2+ L femoral 2+   R popliteal 0+ L popliteal 0+   R posterior tibial doppler+ L posterior tibial doppler+   R dorsalis pedis doppler+ L dorsalis pedis doppler+     Labs:   Lab Results   Component Value Date    WBC 6.4 04/29/2022    HGB 8.0 04/29/2022    HCT 25.7 04/29/2022    MCV 92.8 04/29/2022     04/29/2022     Lab Results   Component Value Date     05/23/2022    K 3.7 05/23/2022     05/23/2022    CO2 26 05/23/2022    BUN 28 05/23/2022    CREATININE 2.20 05/23/2022    GLUCOSE 49 05/23/2022    CALCIUM 9.4 05/23/2022     No results found for: ALKPHOS, ALT, AST, PROT, BILITOT, BILIDIR, LABALBU  No results found for: LACTA  No results found for: AMYLASE  No results found for: LIPASE  No results found for: INR      Assessment:  3 72-year-old male with peripheral arterial disease with ulceration of the right great toe    1. Atherosclerosis of native arteries of extremities with intermittent claudication, bilateral legs (HCC)        Plan:   1. He will be scheduled for a right leg arteriogram with intervention in the upcoming weeks. No orders of the defined types were placed in this encounter.           Electronically signed by Isabela Chairez MD on 5/26/2022 at 8:54 AM     Copy sent to Dr. Fito Philippe MD

## 2022-05-31 ENCOUNTER — HOSPITAL ENCOUNTER (OUTPATIENT)
Dept: WOUND CARE | Age: 77
Discharge: HOME | End: 2022-06-01
Payer: MEDICARE

## 2022-05-31 VITALS
BODY MASS INDEX: 30.2 KG/M2 | SYSTOLIC BLOOD PRESSURE: 126 MMHG | HEIGHT: 70 IN | HEART RATE: 72 BPM | RESPIRATION RATE: 18 BRPM | TEMPERATURE: 97.7 F | DIASTOLIC BLOOD PRESSURE: 62 MMHG

## 2022-05-31 PROCEDURE — 99213 OFFICE O/P EST LOW 20 MIN: CPT | Performed by: PODIATRIST

## 2022-05-31 NOTE — PROGRESS NOTES
Subjective:    Mohamud Pitts is a 68 y.o. male who presents with the ulceration of the R big toe. . Patient has been compliant with off loading. Patient relates pain is Present but unchanged. Currently denies F/C/N/V. Overall diabetic control is not changed. Allergies   Allergen Reactions    Simvastatin Headaches       Past Medical History:   Diagnosis Date    Cerebrovascular accident (CVA) due to embolism of cerebral artery (HCC)     CKD (chronic kidney disease) stage 4, GFR 15-29 ml/min ([facility])     Coronary artery disease involving coronary bypass graft of native heart without angina pectoris     H/O lower gastrointestinal bleeding     Tobacco abuse     Type II or unspecified type diabetes mellitus without mention of complication, not stated as uncontrolled        Prior to Admission medications    Medication Sig Start Date End Date Taking?  Authorizing Provider   furosemide (LASIX) 40 MG tablet Take 1 tablet by mouth 2 times daily 5/13/22   Tomeka Matters, MD   Multiple Vitamins-Minerals (PRESERVISION AREDS 2 PO) Take 1 tablet by mouth 2 times daily    Historical Provider, MD   acetaminophen (TYLENOL) 325 MG tablet Take 650 mg by mouth every 6 hours as needed    Historical Provider, MD   albuterol (PROVENTIL) (2.5 MG/3ML) 0.083% nebulizer solution Inhale 2.5 mg into the lungs every 6 hours as needed     Historical Provider, MD   amiodarone (CORDARONE) 200 MG tablet Take 200 mg by mouth daily 2/24/22   Historical Provider, MD   apixaban (ELIQUIS) 5 MG TABS tablet Take 5 mg by mouth 2 times daily 3/14/22   Historical Provider, MD   aspirin 81 MG EC tablet Take 81 mg by mouth daily    Historical Provider, MD   atorvastatin (LIPITOR) 40 MG tablet Take 40 mg by mouth nightly  9/30/21   Historical Provider, MD   bisacodyl (DULCOLAX) 10 MG suppository Place 10 mg rectally daily     Historical Provider, MD   ergocalciferol (ERGOCALCIFEROL) 1.25 MG (36848 UT) capsule TAKE ONE CAPSULE BY MOUTH ONCE EVERY WEEK FOR VITAMIN (D) DEFICIENCY 10/12/21   Historical Provider, MD   famotidine (PEPCID) 20 MG tablet Take 20 mg by mouth 2 times daily 2/24/22   Historical Provider, MD   hydrALAZINE (APRESOLINE) 10 MG tablet Take 10 mg by mouth 3 times daily 4/19/22   Historical Provider, MD   insulin lispro, 1 Unit Dial, 100 UNIT/ML SOPN Per s/s 2/24/22   Historical Provider, MD   isosorbide dinitrate (ISORDIL) 10 MG tablet Take 10 mg by mouth 3 times daily 4/19/22   Historical Provider, MD   magnesium oxide (MAG-OX) 400 (240 Mg) MG tablet Take 200 mg by mouth daily 3/31/22   Historical Provider, MD   metoprolol succinate (TOPROL XL) 25 MG extended release tablet Take 25 mg by mouth daily 9/30/21   Historical Provider, MD   QUEtiapine (SEROQUEL) 25 MG tablet Take 25 mg by mouth 2/24/22   Historical Provider, MD   atorvastatin (LIPITOR) 80 MG tablet Take 80 mg by mouth daily    Historical Provider, MD   GLIPIZIDE PO Take 5 mg by mouth daily     Historical Provider, MD   metFORMIN (GLUCOPHAGE) 850 MG tablet Take 850 mg by mouth 2 times daily (with meals)     Historical Provider, MD       Social History     Tobacco Use    Smoking status: Former Smoker     Packs/day: 1.50     Types: Cigarettes    Smokeless tobacco: Never Used   Substance Use Topics    Alcohol use: No       ROS: All 14 ROS systems reviewed and pertinent positives noted above, all others negative. Lower Extremity Physical Examination:     Vitals:   Vitals:    05/31/22 1300   BP: 126/62   Pulse: 72   Resp: 18   Temp: 97.7 °F (36.5 °C)     General: AAO x 3 in NAD. Vascular: DP and PT pulses palpable 1/4, bilateral.  CFT >5 seconds, bilateral.  Hair growth absent to the level of the digits, bilateral.  Edema present, bilateral.  Varicosities present, bilateral. Erythema absent, bilateral. Distal Rubor present bilateral toes. Temperature decreased bilateral. Hyperpigmentation present bilateral. Atrophic skin yes.   Neurological: Sensation intact to light touch to level of digits, bilateral.  Protective sensation intact 10/10 sites via 5.07/10g [place]-Pushpa Monofilament, bilateral.  negative Tinel's, bilateral.  negative Valleix sign, bilateral.  Vibratory intact bilateral.  Reflexes Decreased bilateral.  Paresthesias negative. Dysthesias negative. Sharp/dull intact bilateral.    Musculoskeletal: Muscle strength 5/5, bilateral.  Pain present upon palpation R hallux. Normal medial longitudinal arch, bilateral.  Ankle ROM decreased,bilateral.  1st MPJ ROM within normal limits, bilateral.  Dorsally contracted digits absent. No other foot deformities. Integument:   Open lesion present, Right. Ulcer medial right hallux with positive eschar formation. Very stable, There is no red granular tissue whatsoever. No undermining no probing no malodor no signs infection on periphery no proximal streaking. Interdigital maceration absent to web spaces , Bilateral.  Nails b/l thickened, dystrophic and crumbly, discolored with subungual debris. Fissures absent, Bilateral. Hyperkeratotic tissue is absent.     Wound 05/05/22 # right great toe (Active)   Wound Image   05/31/22 1310   Wound Etiology Diabetic 05/31/22 1310   Dressing Status Dry 05/31/22 1310   Wound Cleansed Cleansed with saline 05/31/22 1310   Dressing/Treatment Betadine swabs/povidone iodine 05/31/22 1310   Offloading for Diabetic Foot Ulcers Diabetic shoes/inserts 05/31/22 1310   Dressing Change Due 06/01/22 05/31/22 1310   Wound Length (cm) 4.5 cm 05/31/22 1310   Wound Width (cm) 2 cm 05/31/22 1310   Wound Depth (cm) 0.1 cm 05/31/22 1310   Wound Surface Area (cm^2) 9 cm^2 05/31/22 1310   Change in Wound Size % (l*w) -57.89 05/31/22 1310   Wound Volume (cm^3) 0.9 cm^3 05/31/22 1310   Wound Healing % -58 05/31/22 1310   Post-Procedure Length (cm) 4 cm 05/05/22 0916   Post-Procedure Width (cm) 2 cm 05/05/22 0916   Post-Procedure Surface Area (cm^2) 8 cm^2 05/05/22 0916   Wound Assessment Eschar dry 05/31/22 1310   Drainage

## 2022-05-31 NOTE — PLAN OF CARE
Problem: Chronic Conditions and Co-morbidities  Goal: Patient's chronic conditions and co-morbidity symptoms are monitored and maintained or improved  Outcome: Progressing     Problem: Cognitive:  Goal: Knowledge of wound care  Description: Knowledge of wound care  Outcome: Progressing  Goal: Understands risk factors for wounds  Description: Understands risk factors for wounds  Outcome: Progressing     Problem: Wound:  Goal: Will show signs of wound healing; wound closure and no evidence of infection  Description: Will show signs of wound healing; wound closure and no evidence of infection  Outcome: Progressing     Problem: Weight control:  Goal: Ability to maintain an optimal weight for height and age will be supported  Description: Ability to maintain an optimal weight for height and age will be supported  Outcome: Progressing     Problem: Falls - Risk of:  Goal: Will remain free from falls  Description: Will remain free from falls  Outcome: Progressing     Problem: Blood Glucose:  Goal: Ability to maintain appropriate glucose levels will improve  Description: Ability to maintain appropriate glucose levels will improve  Outcome: Progressing

## 2022-06-12 ENCOUNTER — APPOINTMENT (OUTPATIENT)
Dept: GENERAL RADIOLOGY | Age: 77
End: 2022-06-12
Payer: MEDICARE

## 2022-06-12 ENCOUNTER — HOSPITAL ENCOUNTER (EMERGENCY)
Age: 77
Discharge: ANOTHER HOSPITAL | End: 2022-06-12
Attending: EMERGENCY MEDICINE
Payer: MEDICARE

## 2022-06-12 VITALS
HEIGHT: 70 IN | WEIGHT: 198 LBS | OXYGEN SATURATION: 96 % | RESPIRATION RATE: 19 BRPM | SYSTOLIC BLOOD PRESSURE: 144 MMHG | BODY MASS INDEX: 28.35 KG/M2 | TEMPERATURE: 97.5 F | HEART RATE: 63 BPM | DIASTOLIC BLOOD PRESSURE: 70 MMHG

## 2022-06-12 DIAGNOSIS — I50.9 ACUTE ON CHRONIC CONGESTIVE HEART FAILURE, UNSPECIFIED HEART FAILURE TYPE (HCC): ICD-10-CM

## 2022-06-12 DIAGNOSIS — I21.4 NON-ST ELEVATION MI (NSTEMI) (HCC): Primary | ICD-10-CM

## 2022-06-12 DIAGNOSIS — D64.9 CHRONIC ANEMIA: ICD-10-CM

## 2022-06-12 LAB
-: NORMAL
ABSOLUTE EOS #: 0.24 K/UL (ref 0–0.44)
ABSOLUTE IMMATURE GRANULOCYTE: <0.03 K/UL (ref 0–0.3)
ABSOLUTE LYMPH #: 1.12 K/UL (ref 1.1–3.7)
ABSOLUTE MONO #: 0.77 K/UL (ref 0.1–1.2)
ALBUMIN SERPL-MCNC: 3.7 G/DL (ref 3.5–5.2)
ALBUMIN/GLOBULIN RATIO: 1.4 (ref 1–2.5)
ALP BLD-CCNC: 96 U/L (ref 40–129)
ALT SERPL-CCNC: 11 U/L (ref 5–41)
ANION GAP SERPL CALCULATED.3IONS-SCNC: 14 MMOL/L (ref 9–17)
AST SERPL-CCNC: 12 U/L
BACTERIA: NORMAL
BASOPHILS # BLD: 1 % (ref 0–2)
BASOPHILS ABSOLUTE: 0.04 K/UL (ref 0–0.2)
BILIRUB SERPL-MCNC: 0.41 MG/DL (ref 0.3–1.2)
BILIRUBIN URINE: NEGATIVE
BUN BLDV-MCNC: 30 MG/DL (ref 8–23)
BUN/CREAT BLD: 14 (ref 9–20)
CALCIUM SERPL-MCNC: 8.8 MG/DL (ref 8.6–10.4)
CHLORIDE BLD-SCNC: 97 MMOL/L (ref 98–107)
CO2: 23 MMOL/L (ref 20–31)
CREAT SERPL-MCNC: 2.09 MG/DL (ref 0.7–1.2)
D-DIMER QUANTITATIVE: 4.91 MG/L FEU (ref 0–0.59)
EKG ATRIAL RATE: 63 BPM
EKG P AXIS: 85 DEGREES
EKG P-R INTERVAL: 196 MS
EKG Q-T INTERVAL: 478 MS
EKG QRS DURATION: 156 MS
EKG QTC CALCULATION (BAZETT): 489 MS
EKG R AXIS: 3 DEGREES
EKG T AXIS: 116 DEGREES
EKG VENTRICULAR RATE: 63 BPM
EOSINOPHILS RELATIVE PERCENT: 3 % (ref 1–4)
EPITHELIAL CELLS UA: NORMAL /HPF (ref 0–5)
GFR AFRICAN AMERICAN: 38 ML/MIN
GFR NON-AFRICAN AMERICAN: 31 ML/MIN
GFR SERPL CREATININE-BSD FRML MDRD: ABNORMAL ML/MIN/{1.73_M2}
GLUCOSE BLD-MCNC: 164 MG/DL (ref 75–110)
GLUCOSE BLD-MCNC: 196 MG/DL (ref 70–99)
GLUCOSE URINE: NEGATIVE
HCT VFR BLD CALC: 23.3 % (ref 40.7–50.3)
HEMOGLOBIN: 7.4 G/DL (ref 13–17)
IMMATURE GRANULOCYTES: 0 %
KETONES, URINE: NEGATIVE
LACTIC ACID: 1.8 MMOL/L (ref 0.5–2.2)
LEUKOCYTE ESTERASE, URINE: NEGATIVE
LYMPHOCYTES # BLD: 16 % (ref 24–43)
MCH RBC QN AUTO: 26.1 PG (ref 25.2–33.5)
MCHC RBC AUTO-ENTMCNC: 31.8 G/DL (ref 25.2–33.5)
MCV RBC AUTO: 82 FL (ref 82.6–102.9)
MONOCYTES # BLD: 11 % (ref 3–12)
NITRITE, URINE: NEGATIVE
NRBC AUTOMATED: 0 PER 100 WBC
PDW BLD-RTO: 15.9 % (ref 11.8–14.4)
PH UA: 6.5 (ref 5–6)
PLATELET # BLD: 223 K/UL (ref 138–453)
PMV BLD AUTO: 10.4 FL (ref 8.1–13.5)
POTASSIUM SERPL-SCNC: 4.2 MMOL/L (ref 3.7–5.3)
PRO-BNP: 6330 PG/ML
PROTEIN UA: NEGATIVE
RBC # BLD: 2.84 M/UL (ref 4.21–5.77)
RBC # BLD: ABNORMAL 10*6/UL
RBC UA: NORMAL /HPF (ref 0–4)
SARS-COV-2, RAPID: NOT DETECTED
SEG NEUTROPHILS: 69 % (ref 36–65)
SEGMENTED NEUTROPHILS ABSOLUTE COUNT: 4.92 K/UL (ref 1.5–8.1)
SODIUM BLD-SCNC: 134 MMOL/L (ref 135–144)
SPECIFIC GRAVITY UA: 1 (ref 1.01–1.02)
SPECIMEN DESCRIPTION: NORMAL
TOTAL PROTEIN: 6.4 G/DL (ref 6.4–8.3)
TROPONIN, HIGH SENSITIVITY: 65 NG/L (ref 0–22)
TROPONIN, HIGH SENSITIVITY: 77 NG/L (ref 0–22)
URINE HGB: NEGATIVE
UROBILINOGEN, URINE: NORMAL
WBC # BLD: 7.1 K/UL (ref 3.5–11.3)
WBC UA: NORMAL /HPF (ref 0–4)

## 2022-06-12 PROCEDURE — 99285 EMERGENCY DEPT VISIT HI MDM: CPT

## 2022-06-12 PROCEDURE — 93005 ELECTROCARDIOGRAM TRACING: CPT | Performed by: EMERGENCY MEDICINE

## 2022-06-12 PROCEDURE — 83605 ASSAY OF LACTIC ACID: CPT

## 2022-06-12 PROCEDURE — 96374 THER/PROPH/DIAG INJ IV PUSH: CPT

## 2022-06-12 PROCEDURE — 6370000000 HC RX 637 (ALT 250 FOR IP): Performed by: EMERGENCY MEDICINE

## 2022-06-12 PROCEDURE — 85379 FIBRIN DEGRADATION QUANT: CPT

## 2022-06-12 PROCEDURE — 36415 COLL VENOUS BLD VENIPUNCTURE: CPT

## 2022-06-12 PROCEDURE — 87635 SARS-COV-2 COVID-19 AMP PRB: CPT

## 2022-06-12 PROCEDURE — 6360000002 HC RX W HCPCS: Performed by: EMERGENCY MEDICINE

## 2022-06-12 PROCEDURE — 85025 COMPLETE CBC W/AUTO DIFF WBC: CPT

## 2022-06-12 PROCEDURE — 83880 ASSAY OF NATRIURETIC PEPTIDE: CPT

## 2022-06-12 PROCEDURE — 81001 URINALYSIS AUTO W/SCOPE: CPT

## 2022-06-12 PROCEDURE — 71045 X-RAY EXAM CHEST 1 VIEW: CPT

## 2022-06-12 PROCEDURE — 84484 ASSAY OF TROPONIN QUANT: CPT

## 2022-06-12 PROCEDURE — 94640 AIRWAY INHALATION TREATMENT: CPT

## 2022-06-12 PROCEDURE — 82947 ASSAY GLUCOSE BLOOD QUANT: CPT

## 2022-06-12 PROCEDURE — 80053 COMPREHEN METABOLIC PANEL: CPT

## 2022-06-12 RX ORDER — FUROSEMIDE 10 MG/ML
40 INJECTION INTRAMUSCULAR; INTRAVENOUS ONCE
Status: COMPLETED | OUTPATIENT
Start: 2022-06-12 | End: 2022-06-12

## 2022-06-12 RX ORDER — IPRATROPIUM BROMIDE AND ALBUTEROL SULFATE 2.5; .5 MG/3ML; MG/3ML
1 SOLUTION RESPIRATORY (INHALATION) ONCE
Status: COMPLETED | OUTPATIENT
Start: 2022-06-12 | End: 2022-06-12

## 2022-06-12 RX ADMIN — IPRATROPIUM BROMIDE AND ALBUTEROL SULFATE 1 AMPULE: .5; 2.5 SOLUTION RESPIRATORY (INHALATION) at 09:55

## 2022-06-12 RX ADMIN — FUROSEMIDE 40 MG: 10 INJECTION, SOLUTION INTRAMUSCULAR; INTRAVENOUS at 09:54

## 2022-06-12 ASSESSMENT — PAIN - FUNCTIONAL ASSESSMENT: PAIN_FUNCTIONAL_ASSESSMENT: NONE - DENIES PAIN

## 2022-06-12 NOTE — ED PROVIDER NOTES
[facility] ED  [address]  [place] [address]  Phone: [phone]        Pt Name: Bernadette Richardson  MRN: [id number]  [place] 1945  Date of evaluation: 6/12/22      CHIEF COMPLAINT     Chief Complaint   Patient presents with    Shortness of Breath         HISTORY OF PRESENT ILLNESS  (Location/Symptom, Timing/Onset, Context/Setting, Quality, Duration, Modifying Factors, Severity.)    Bernadette Richardson is a 68 y.o. male who presents with shortness of breath. Patient states he has been feeling short of breath since he woke up this morning. Been coughing up brown sputum. Patient has a history o congestive heart failure, atrial fibrillation, f ischemic cardiomyopathy and coronary artery disease and underwent a triple bypass February 2022 while in the hospital he had frequent episodes of nonsustained V. tach. He was started on amiodarone and discharged with a LifeVest.  Echocardiogram on 3/21/2022 shows he has an EF of 25%. He needs to have an ICD implanted but because of the infection in the foot they put that on hold. .  Patient has peripheral arterial disease with ulceration to the right great toe. 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 Patient has a history of smoking and only recently quit in February 2022. Patient denies having any chest pain. He denies any fever or chills. Denies any sore throat. Denies any abdominal pain vomiting or diarrhea. He has swelling bilateral lower extremities. Patient is being followed by Dr. Alejandra Valente for management of the infection in his right foot. Patient is on Eliquis.       REVIEW OF SYSTEMS    (2-9 systems for level 4, 10 or more for level 5)     Review of Systems systems are reviewed and are negative except was presented the HPI    PAST MEDICAL HISTORY    has a past medical history of Cerebrovascular accident (CVA) due to embolism of cerebral artery ([facility] CKD (chronic kidney disease) stage 4, GFR 15-29 ml/min ([facility] Coronary artery disease involving coronary bypass graft of native heart without angina pectoris, H/O lower gastrointestinal bleeding, Tobacco abuse, and Type II or unspecified type diabetes mellitus without mention of complication, not stated as uncontrolled. SURGICAL HISTORY      has a past surgical history that includes Cardiac surgery.     CURRENTMEDICATIONS       Previous Medications    ACETAMINOPHEN (TYLENOL) 325 MG TABLET    Take 650 mg by mouth every 6 hours as needed    ALBUTEROL (PROVENTIL) (2.5 MG/3ML) 0.083% NEBULIZER SOLUTION    Inhale 2.5 mg into the lungs every 6 hours as needed     AMIODARONE (CORDARONE) 200 MG TABLET    Take 200 mg by mouth daily    APIXABAN (ELIQUIS) 5 MG TABS TABLET    Take 5 mg by mouth 2 times daily    ASPIRIN 81 MG EC TABLET    Take 81 mg by mouth daily    ATORVASTATIN (LIPITOR) 40 MG TABLET    Take 40 mg by mouth nightly     ATORVASTATIN (LIPITOR) 80 MG TABLET    Take 80 mg by mouth daily    BISACODYL (DULCOLAX) 10 MG SUPPOSITORY    Place 10 mg rectally daily     ERGOCALCIFEROL (ERGOCALCIFEROL) 1.25 MG (12006 UT) CAPSULE    TAKE ONE CAPSULE BY MOUTH ONCE EVERY WEEK FOR VITAMIN (D) DEFICIENCY    FAMOTIDINE (PEPCID) 20 MG TABLET    Take 20 mg by mouth 2 times daily    FUROSEMIDE (LASIX) 40 MG TABLET    Take 1 tablet by mouth 2 times daily    GLIPIZIDE PO    Take 5 mg by mouth daily     HYDRALAZINE (APRESOLINE) 10 MG TABLET    Take 10 mg by mouth 3 times daily    INSULIN LISPRO, 1 UNIT DIAL, 100 UNIT/ML SOPN    Per s/s    ISOSORBIDE DINITRATE (ISORDIL) 10 MG TABLET    Take 10 mg by mouth 3 times daily    MAGNESIUM OXIDE (MAG-OX) 400 (240 MG) MG TABLET    Take 200 mg by mouth daily    METFORMIN (GLUCOPHAGE) 850 MG TABLET    Take 850 mg by mouth 2 times daily (with meals)     METOPROLOL SUCCINATE (TOPROL XL) 25 MG EXTENDED RELEASE TABLET    Take 25 mg by mouth daily    MULTIPLE VITAMINS-MINERALS (PRESERVISION Palpations: Abdomen is soft. Tenderness: There is no abdominal tenderness. There is no right CVA tenderness, left CVA tenderness, guarding or rebound. Negative signs include Dacosta's sign, Rovsing's sign and McBurney's sign. Musculoskeletal:      Cervical back: Normal range of motion and neck supple. Right lower le+ Pitting Edema present. Left lower le+ Pitting Edema present. Feet:      Right foot:      Skin integrity: Ulcer present. Left foot:      Skin integrity: Skin integrity normal.      Comments: Ulcer right great toe. Patient has 2+ pitting edema bilateral lower extremities. Unable to palpate dorsalis pedal pulses but did obtain dorsalis pedal pulses with Doppler  Lymphadenopathy:      Cervical: No cervical adenopathy. Skin:     General: Skin is warm. Capillary Refill: Capillary refill takes less than 2 seconds. Findings: Wound present. Comments: Ulcer right great toe   Neurological:      General: No focal deficit present. Mental Status: He is alert. GCS: GCS eye subscore is 4. GCS verbal subscore is 4. GCS motor subscore is 6. Sensory: Sensation is intact. Motor: Motor function is intact. Gait: Gait abnormal.      Comments: Patient has poor memory and had difficulty providing past medical history. Information was obtained from patient's wife. Patient walks with a cane. Psychiatric:         Attention and Perception: Attention normal.         Mood and Affect: Mood normal.         Speech: Speech normal.         Behavior: Behavior normal.         Thought Content: Thought content normal.         Cognition and Memory: Memory is impaired. He exhibits impaired recent memory. DIFFERENTIAL DIAGNOSIS/ MDM:     Coronary artery disease with shortness of breath. We will do a cardiac work-up. History of congestive heart failure with swelling bilateral lower extremities. History of smoking possibly has exacerbation of COPD.   Patient was hypoxic and required oxygen at 2 L nasal cannula. Possibly has a pneumonia as he has been coughing up brown sputum. DIAGNOSTIC RESULTS     EKG: All EKG's are interpreted by the Emergency Department Physician who either signs or Co-signs this chart in the absence of a cardiologist.      Interpreted by Donita Carver DO     Rhythm: normal sinus with occasional PVC. Rate: normal  Axis: normal  Ectopy: none  Conduction: Left bundle branch block  ST Segments: no acute change  T Waves: no acute change  Q Waves: none    Clinical Impression: normal sinus rhythm with no acute changes/normal EKG. No acute infarction/ischemia noted. RADIOLOGY:        Interpretation per the Radiologist below, if available at the time of this note:      XR CHEST PORTABLE    Result Date: 6/12/2022  EXAMINATION: ONE XRAY VIEW OF THE CHEST 6/12/2022 11:00 am COMPARISON: None. HISTORY: ORDERING SYSTEM PROVIDED HISTORY: SOB, swelling bilateral lower extremities TECHNOLOGIST PROVIDED HISTORY: SOB, swelling bilateral lower extremities Reason for Exam: Leg swelling, hx heart attacks. FINDINGS: Moderate-large left pleural effusion. Moderate-severe left basilar atelectasis. Pulmonary vascular redistribution. Moderate cardiomegaly. Sternotomy wires are present. Electronic devices overlie the chest.     1. Moderate-large left pleural effusion with left basilar atelectasis. 2. Findings of pulmonary edema with cardiomegaly. LABS:  Results for orders placed or performed during the hospital encounter of 06/12/22   COVID-19, Rapid    Specimen: Nasopharyngeal Swab   Result Value Ref Range    Specimen Description . NASOPHARYNGEAL SWAB     SARS-CoV-2, Rapid Not Detected Not Detected   CBC with Auto Differential   Result Value Ref Range    WBC 7.1 3.5 - 11.3 k/uL    RBC 2.84 (L) 4.21 - 5.77 m/uL    Hemoglobin 7.4 (L) 13.0 - 17.0 g/dL    Hematocrit 23.3 (L) 40.7 - 50.3 %    MCV 82.0 (L) 82.6 - 102.9 fL    MCH 26.1 25.2 - 33.5 pg    MCHC 31.8 25.2 - 33.5 g/dL    RDW 15.9 (H) 11.8 - 14.4 %    Platelets 707 381 - 832 k/uL    MPV 10.4 8.1 - 13.5 fL    NRBC Automated 0.0 0.0 per 100 WBC    Seg Neutrophils 69 (H) 36 - 65 %    Lymphocytes 16 (L) 24 - 43 %    Monocytes 11 3 - 12 %    Eosinophils % 3 1 - 4 %    Basophils 1 0 - 2 %    Immature Granulocytes 0 0 %    Segs Absolute 4.92 1.50 - 8.10 k/uL    Absolute Lymph # 1.12 1.10 - 3.70 k/uL    Absolute Mono # 0.77 0.10 - 1.20 k/uL    Absolute Eos # 0.24 0.00 - 0.44 k/uL    Basophils Absolute 0.04 0.00 - 0.20 k/uL    Absolute Immature Granulocyte <0.03 0.00 - 0.30 k/uL    RBC Morphology ANISOCYTOSIS PRESENT    Comprehensive Metabolic Panel w/ Reflex to MG   Result Value Ref Range    Glucose 196 (H) 70 - 99 mg/dL    BUN 30 (H) 8 - 23 mg/dL    CREATININE 2.09 (H) 0.70 - 1.20 mg/dL    Bun/Cre Ratio 14 9 - 20    Calcium 8.8 8.6 - 10.4 mg/dL    Sodium 134 (L) 135 - 144 mmol/L    Potassium 4.2 3.7 - 5.3 mmol/L    Chloride 97 (L) 98 - 107 mmol/L    CO2 23 20 - 31 mmol/L    Anion Gap 14 9 - 17 mmol/L    Alkaline Phosphatase 96 40 - 129 U/L    ALT 11 5 - 41 U/L    AST 12 <40 U/L    Total Bilirubin 0.41 0.3 - 1.2 mg/dL    Total Protein 6.4 6.4 - 8.3 g/dL    Albumin 3.7 3.5 - 5.2 g/dL    Albumin/Globulin Ratio 1.4 1.0 - 2.5    GFR Non- 31 (L) >60 mL/min    GFR  38 (L) >60 mL/min    GFR Comment         Troponin   Result Value Ref Range    Troponin, High Sensitivity 77 (HH) 0 - 22 ng/L   Brain Natriuretic Peptide   Result Value Ref Range    Pro-BNP 6,330 (H) <300 pg/mL   D-Dimer, Quantitative   Result Value Ref Range    D-Dimer, Quant 4.91 (H) 0.00 - 0.59 mg/L FEU   Lactic Acid   Result Value Ref Range    Lactic Acid 1.8 0.5 - 2.2 mmol/L   Troponin   Result Value Ref Range    Troponin, High Sensitivity 65 (HH) 0 - 22 ng/L   POC Glucose Fingerstick   Result Value Ref Range    POC Glucose 164 (H) 75 - 110 mg/dL   EKG 12 Lead   Result Value Ref Range    Ventricular Rate 63 BPM    Atrial Rate 63 BPM    P-R Interval 196 ms    QRS Duration 156 ms    Q-T Interval 478 ms    QTc Calculation (Bazett) 489 ms    P Axis 85 degrees    R Axis 3 degrees    T Axis 116 degrees           EMERGENCY DEPARTMENT COURSE:   Vitals:    Vitals:    06/12/22 1115 06/12/22 1130 06/12/22 1145 06/12/22 1200   BP: (!) 147/61 (!) 152/71 (!) 147/62 (!) 150/62   Pulse: 60 67 61 61   Resp: 20 21 24 24   Temp:       TempSrc:       SpO2: 96% 94% 95% 97%   Weight:       Height:         -------------------------  BP: (!) 150/62, Temp: 97.5 °F (36.4 °C), Heart Rate: 61, Resp: 24      RE-EVALUATION:  10:26 AM EDT patient has a non-ST elevation MI with an elevated troponin of 77 and she is in congestive heart failure. His EKG shows a sinus rhythm with a left bundle branch block and occasional PVCs. I am trying to get a previous EKG to compare to. He is already on Eliquis. He denies having any pain. He has been given Lasix. Patient's cardiac care has all been done at [place] [address]. Patient and family are requesting we transfer him to [place] [address]. Patient does have an elevated D-dimer but poor renal function and he is already on Eliquis. We will hold off on doing a CTA of the chest.  12:35 PM EDT patient remains chest pain-free. Chest x-ray shows congestive heart failure. Patient also has chronic anemia. Repeat troponin is 65. It has come down from 77. CONSULTS:  12:23 PM EDT [place] ED physician at [place] [address] accepted patient for transfer    PROCEDURES:  CRITICAL CARE: There was a high probability of clinically significant/life threatening deterioration in this patient's condition which required my urgent intervention. Total critical care time was 40 minutes. This excludes any time for separately reportable procedures. FINAL IMPRESSION      1. Non-ST elevation MI (NSTEMI) ([facility]    2. Acute on chronic congestive heart failure, unspecified heart failure type ([facility]    3.  Chronic anemia          DISPOSITION/PLAN   DISPOSITION CONDITION ON DISPOSITION:   Stable    PATIENT REFERRED TO:  No follow-up provider specified. DISCHARGE MEDICATIONS:  New Prescriptions    No medications on file       (Please note that portions of this note were completed with a voicerecognition program.  Efforts were made to edit the dictations but occasionally words are mis-transcribed. )    Lalito Berry DO,, MD, F.A.C.E.P.   Attending Emergency Medicine Physician        Abhishek Benitez,   06/12/22 [address],   06/12/22 1238

## 2022-06-21 ENCOUNTER — HOSPITAL ENCOUNTER (OUTPATIENT)
Dept: LAB | Age: 77
Discharge: HOME | End: 2022-06-21
Payer: MEDICARE

## 2022-06-21 DIAGNOSIS — N18.32 STAGE 3B CHRONIC KIDNEY DISEASE (HCC): ICD-10-CM

## 2022-06-21 LAB
ANION GAP SERPL CALCULATED.3IONS-SCNC: 16 MMOL/L (ref 9–17)
BUN BLDV-MCNC: 62 MG/DL (ref 8–23)
BUN/CREAT BLD: 17 (ref 9–20)
CALCIUM SERPL-MCNC: 8.7 MG/DL (ref 8.6–10.4)
CHLORIDE BLD-SCNC: 87 MMOL/L (ref 98–107)
CO2: 25 MMOL/L (ref 20–31)
CREAT SERPL-MCNC: 3.57 MG/DL (ref 0.7–1.2)
GFR AFRICAN AMERICAN: 20 ML/MIN
GFR NON-AFRICAN AMERICAN: 17 ML/MIN
GFR SERPL CREATININE-BSD FRML MDRD: ABNORMAL ML/MIN/{1.73_M2}
GLUCOSE BLD-MCNC: 185 MG/DL (ref 70–99)
POTASSIUM SERPL-SCNC: 3.7 MMOL/L (ref 3.7–5.3)
SODIUM BLD-SCNC: 128 MMOL/L (ref 135–144)

## 2022-06-21 PROCEDURE — 80048 BASIC METABOLIC PNL TOTAL CA: CPT

## 2022-06-21 PROCEDURE — 36415 COLL VENOUS BLD VENIPUNCTURE: CPT

## 2022-06-22 ENCOUNTER — APPOINTMENT (OUTPATIENT)
Dept: GENERAL RADIOLOGY | Age: 77
End: 2022-06-22
Payer: OTHER GOVERNMENT

## 2022-06-22 ENCOUNTER — HOSPITAL ENCOUNTER (EMERGENCY)
Age: 77
Discharge: ANOTHER HOSPITAL | End: 2022-06-22
Attending: EMERGENCY MEDICINE
Payer: OTHER GOVERNMENT

## 2022-06-22 VITALS
RESPIRATION RATE: 14 BRPM | BODY MASS INDEX: 26.2 KG/M2 | HEIGHT: 70 IN | DIASTOLIC BLOOD PRESSURE: 68 MMHG | TEMPERATURE: 96.2 F | SYSTOLIC BLOOD PRESSURE: 119 MMHG | HEART RATE: 60 BPM | WEIGHT: 183 LBS | OXYGEN SATURATION: 100 %

## 2022-06-22 DIAGNOSIS — N17.9 ACUTE KIDNEY INJURY (HCC): Primary | ICD-10-CM

## 2022-06-22 LAB
ABSOLUTE EOS #: 0.29 K/UL (ref 0–0.44)
ABSOLUTE IMMATURE GRANULOCYTE: 0.04 K/UL (ref 0–0.3)
ABSOLUTE LYMPH #: 0.98 K/UL (ref 1.1–3.7)
ABSOLUTE MONO #: 0.77 K/UL (ref 0.1–1.2)
ANION GAP SERPL CALCULATED.3IONS-SCNC: 14 MMOL/L (ref 9–17)
BASOPHILS # BLD: 0 % (ref 0–2)
BASOPHILS ABSOLUTE: 0.03 K/UL (ref 0–0.2)
BUN BLDV-MCNC: 67 MG/DL (ref 8–23)
BUN/CREAT BLD: 20 (ref 9–20)
CALCIUM SERPL-MCNC: 8.7 MG/DL (ref 8.6–10.4)
CHLORIDE BLD-SCNC: 90 MMOL/L (ref 98–107)
CO2: 25 MMOL/L (ref 20–31)
CREAT SERPL-MCNC: 3.32 MG/DL (ref 0.7–1.2)
EOSINOPHILS RELATIVE PERCENT: 4 % (ref 1–4)
GFR AFRICAN AMERICAN: 22 ML/MIN
GFR NON-AFRICAN AMERICAN: 18 ML/MIN
GFR SERPL CREATININE-BSD FRML MDRD: ABNORMAL ML/MIN/{1.73_M2}
GLUCOSE BLD-MCNC: 222 MG/DL (ref 70–99)
HCT VFR BLD CALC: 25.6 % (ref 40.7–50.3)
HEMOGLOBIN: 8.1 G/DL (ref 13–17)
IMMATURE GRANULOCYTES: 1 %
LYMPHOCYTES # BLD: 15 % (ref 24–43)
MCH RBC QN AUTO: 25.9 PG (ref 25.2–33.5)
MCHC RBC AUTO-ENTMCNC: 31.6 G/DL (ref 25.2–33.5)
MCV RBC AUTO: 81.8 FL (ref 82.6–102.9)
MONOCYTES # BLD: 12 % (ref 3–12)
NRBC AUTOMATED: 0 PER 100 WBC
PDW BLD-RTO: 17 % (ref 11.8–14.4)
PLATELET # BLD: 255 K/UL (ref 138–453)
PMV BLD AUTO: 10.1 FL (ref 8.1–13.5)
POTASSIUM SERPL-SCNC: 4 MMOL/L (ref 3.7–5.3)
PRO-BNP: 2721 PG/ML
RBC # BLD: 3.13 M/UL (ref 4.21–5.77)
RBC # BLD: ABNORMAL 10*6/UL
SEG NEUTROPHILS: 68 % (ref 36–65)
SEGMENTED NEUTROPHILS ABSOLUTE COUNT: 4.6 K/UL (ref 1.5–8.1)
SODIUM BLD-SCNC: 129 MMOL/L (ref 135–144)
TROPONIN, HIGH SENSITIVITY: 71 NG/L (ref 0–22)
WBC # BLD: 6.7 K/UL (ref 3.5–11.3)

## 2022-06-22 PROCEDURE — 84484 ASSAY OF TROPONIN QUANT: CPT

## 2022-06-22 PROCEDURE — 85025 COMPLETE CBC W/AUTO DIFF WBC: CPT

## 2022-06-22 PROCEDURE — 71045 X-RAY EXAM CHEST 1 VIEW: CPT

## 2022-06-22 PROCEDURE — 99285 EMERGENCY DEPT VISIT HI MDM: CPT

## 2022-06-22 PROCEDURE — 2580000003 HC RX 258: Performed by: EMERGENCY MEDICINE

## 2022-06-22 PROCEDURE — 83880 ASSAY OF NATRIURETIC PEPTIDE: CPT

## 2022-06-22 PROCEDURE — 80048 BASIC METABOLIC PNL TOTAL CA: CPT

## 2022-06-22 PROCEDURE — 36415 COLL VENOUS BLD VENIPUNCTURE: CPT

## 2022-06-22 PROCEDURE — 93005 ELECTROCARDIOGRAM TRACING: CPT | Performed by: EMERGENCY MEDICINE

## 2022-06-22 RX ORDER — 0.9 % SODIUM CHLORIDE 0.9 %
500 INTRAVENOUS SOLUTION INTRAVENOUS ONCE
Status: COMPLETED | OUTPATIENT
Start: 2022-06-22 | End: 2022-06-22

## 2022-06-22 RX ADMIN — SODIUM CHLORIDE 500 ML: 9 INJECTION, SOLUTION INTRAVENOUS at 14:27

## 2022-06-22 ASSESSMENT — ENCOUNTER SYMPTOMS
DIARRHEA: 0
VOMITING: 0
ABDOMINAL PAIN: 0
NAUSEA: 0
BACK PAIN: 0
SHORTNESS OF BREATH: 0
TROUBLE SWALLOWING: 0
SORE THROAT: 0
WHEEZING: 0
CONSTIPATION: 0

## 2022-06-22 ASSESSMENT — PAIN - FUNCTIONAL ASSESSMENT: PAIN_FUNCTIONAL_ASSESSMENT: NONE - DENIES PAIN

## 2022-06-22 NOTE — ED PROVIDER NOTES
[address] [facility]  eMERGENCY dEPARTMENT eNCOUnter      Pt Name: Bernadette Richardson  MRN: [id number]  [place] 1945  Date of evaluation: 6/22/2022      CHIEF COMPLAINT       Chief Complaint   Patient presents with    Abnormal Lab         HISTORY OF PRESENT ILLNESS    Bernadette Richardson is a 68 y.o. male who presents with worsening kidney failure, patient was recently transferred from this hospital [facility] after having an NSTEMI and CHF has been diuresed they were having nephrology follow him and his BUN/creatinine have been steadily increasing they have now gone over 3 for creatinine with a BUN in the 60s they recommend he come to the ER and get transferred back  Patient says he feels okay just lightheaded when he stands up there is been no chest pain no shortness of breath he said he is lost a lot of weight  He is on Eliquis, he is also on Lasix 40 twice daily  REVIEW OF SYSTEMS         Review of Systems   Constitutional: Negative for chills and fever. HENT: Negative for congestion, dental problem, sore throat and trouble swallowing. Eyes: Negative for visual disturbance. Respiratory: Negative for shortness of breath and wheezing. Cardiovascular: Negative for chest pain, palpitations and leg swelling. Gastrointestinal: Negative for abdominal pain, constipation, diarrhea, nausea and vomiting. Genitourinary: Negative for difficulty urinating, dysuria and testicular pain. Musculoskeletal: Negative for back pain, joint swelling and neck pain. Skin: Negative for rash. Neurological: Positive for light-headedness. Negative for dizziness, syncope, weakness and headaches. Hematological: Negative for adenopathy. Does not bruise/bleed easily. Psychiatric/Behavioral: Negative for confusion and suicidal ideas.          PAST MEDICAL HISTORY    has a past medical history of Cerebrovascular accident (CVA) due to embolism of cerebral artery ([facility] CKD (chronic kidney disease) stage 4, GFR 15-29 ml/min [facility]), Coronary artery disease involving coronary bypass graft of native heart without angina pectoris, H/O lower gastrointestinal bleeding, Tobacco abuse, and Type II or unspecified type diabetes mellitus without mention of complication, not stated as uncontrolled. SURGICAL HISTORY      has a past surgical history that includes Cardiac surgery.     CURRENT MEDICATIONS       Previous Medications    ACETAMINOPHEN (TYLENOL) 325 MG TABLET    Take 650 mg by mouth every 6 hours as needed    ALBUTEROL (PROVENTIL) (2.5 MG/3ML) 0.083% NEBULIZER SOLUTION    Inhale 2.5 mg into the lungs every 6 hours as needed     AMIODARONE (CORDARONE) 200 MG TABLET    Take 200 mg by mouth daily    APIXABAN (ELIQUIS) 5 MG TABS TABLET    Take 5 mg by mouth 2 times daily    ASPIRIN 81 MG EC TABLET    Take 81 mg by mouth daily    ATORVASTATIN (LIPITOR) 40 MG TABLET    Take 40 mg by mouth nightly     ATORVASTATIN (LIPITOR) 80 MG TABLET    Take 80 mg by mouth daily    BISACODYL (DULCOLAX) 10 MG SUPPOSITORY    Place 10 mg rectally daily     ERGOCALCIFEROL (ERGOCALCIFEROL) 1.25 MG (13969 UT) CAPSULE    TAKE ONE CAPSULE BY MOUTH ONCE EVERY WEEK FOR VITAMIN (D) DEFICIENCY    FAMOTIDINE (PEPCID) 20 MG TABLET    Take 20 mg by mouth 2 times daily    FUROSEMIDE (LASIX) 40 MG TABLET    Take 1 tablet by mouth 2 times daily    GLIPIZIDE PO    Take 5 mg by mouth daily     HYDRALAZINE (APRESOLINE) 10 MG TABLET    Take 10 mg by mouth 3 times daily    INSULIN LISPRO, 1 UNIT DIAL, 100 UNIT/ML SOPN    Per s/s    ISOSORBIDE DINITRATE (ISORDIL) 10 MG TABLET    Take 10 mg by mouth 3 times daily    MAGNESIUM OXIDE (MAG-OX) 400 (240 MG) MG TABLET    Take 200 mg by mouth daily    METFORMIN (GLUCOPHAGE) 850 MG TABLET    Take 850 mg by mouth 2 times daily (with meals)     METOPROLOL SUCCINATE (TOPROL XL) 25 MG EXTENDED RELEASE TABLET    Take 25 mg by mouth daily    MULTIPLE VITAMINS-MINERALS (PRESERVISION AREDS 2 PO)    Take 1 tablet by mouth 2 times daily QUETIAPINE (SEROQUEL) 25 MG TABLET    Take 25 mg by mouth       ALLERGIES     is allergic to simvastatin. FAMILY HISTORY     He indicated that his mother is . He indicated that his father is . He indicated that both of his sisters are alive. He indicated that his brother is alive. family history is not on file. SOCIAL HISTORY      reports that he quit smoking about 3 months ago. His smoking use included cigarettes. He smoked 1.50 packs per day. He has never used smokeless tobacco. He reports that he does not drink alcohol and does not use drugs. PHYSICAL EXAM     INITIAL VITALS:  height is 5' 10\" (1.778 m) and weight is 183 lb (83 kg). His tympanic temperature is 96.2 °F (35.7 °C) (abnormal). His blood pressure is 108/40 (significant, abnormal) and his pulse is 53. His respiration is 18 and oxygen saturation is 97%. Physical Exam  Constitutional:       Appearance: He is well-developed. Comments: Patient is wearing a LifeVest   HENT:      Head: Normocephalic and atraumatic. Right Ear: External ear normal.      Left Ear: External ear normal.   Eyes:      Pupils: Pupils are equal, round, and reactive to light. Cardiovascular:      Rate and Rhythm: Normal rate and regular rhythm. Pulmonary:      Effort: Pulmonary effort is normal.      Breath sounds: Normal breath sounds. Abdominal:      General: Bowel sounds are normal.      Palpations: Abdomen is soft. Musculoskeletal:         General: Normal range of motion. Cervical back: Normal range of motion and neck supple. Skin:     General: Skin is warm and dry. Capillary Refill: Capillary refill takes less than 2 seconds. Neurological:      General: No focal deficit present. Mental Status: He is alert and oriented to person, place, and time.    Psychiatric:         Behavior: Behavior normal.           DIFFERENTIAL DIAGNOSIS/ MDM:     Lightheaded hypotensive, recent diuresis suspect that this is the cause of his renal failure    DIAGNOSTIC RESULTS     EKG: All EKG's are interpreted by the Emergency Department Physician who either signs or Co-signs this chart in the absence of a cardiologist.  Sinus bradycardia with a rate of 51 bpm, IN interval 216 ms QRS durations 158 ms QT corrected 486 ms axis is 48  Has a left bundle branch block pattern which was seen on previous EKGs    RADIOLOGY:   I directly visualized the following  images and reviewed the radiologist interpretations:     EXAMINATION:   ONE XRAY VIEW OF THE CHEST       6/22/2022 1:32 pm       COMPARISON:   None.       HISTORY:   ORDERING SYSTEM PROVIDED HISTORY: shortness of breath   TECHNOLOGIST PROVIDED HISTORY:   shortness of breath   Reason for Exam: SOB       FINDINGS:   No acute airspace infiltrate.  No pneumothorax or pleural effusion.  Mild   cardiomegaly.  Status post median sternotomy.           Impression   No acute cardiopulmonary findings.  Mild cardiomegaly                 ED BEDSIDE ULTRASOUND:       LABS:  Labs Reviewed   BASIC METABOLIC PANEL W/ REFLEX TO MG FOR LOW K - Abnormal; Notable for the following components:       Result Value    Glucose 222 (*)     BUN 67 (*)     CREATININE 3.32 (*)     Sodium 129 (*)     Chloride 90 (*)     GFR Non- 18 (*)     GFR  22 (*)     All other components within normal limits   CBC WITH AUTO DIFFERENTIAL - Abnormal; Notable for the following components:    RBC 3.13 (*)     Hemoglobin 8.1 (*)     Hematocrit 25.6 (*)     MCV 81.8 (*)     RDW 17.0 (*)     Seg Neutrophils 68 (*)     Lymphocytes 15 (*)     Immature Granulocytes 1 (*)     Absolute Lymph # 0.98 (*)     All other components within normal limits   TROPONIN - Abnormal; Notable for the following components:    Troponin, High Sensitivity 71 (*)     All other components within normal limits   BRAIN NATRIURETIC PEPTIDE - Abnormal; Notable for the following components:    Pro-BNP 2,721 (*)     All other components within normal limits           EMERGENCY DEPARTMENT COURSE:   Vitals:    Vitals:    06/22/22 1307 06/22/22 1310 06/22/22 1350   BP:  (!) 113/24 (S) (!) 108/40   Pulse: 53     Resp: 18     Temp: (!) 96.2 °F (35.7 °C)     TempSrc: Tympanic     SpO2: 98% 97%    Weight: 183 lb (83 kg)     Height: 5' 10\" (1.778 m)       -------------------------  BP: (S) (!) 108/40, Temp: (!) 96.2 °F (35.7 °C), Heart Rate: 53, Resp: 18        Re-evaluation Notes        CRITICAL CARE:   None        CONSULTS:      PROCEDURES:  None    FINAL IMPRESSION      1. Acute kidney injury ([facility]          DISPOSITION/PLAN   DISPOSITION     Condition on Disposition    Stable    PATIENT REFERRED TO:  No follow-up provider specified. DISCHARGE MEDICATIONS:  New Prescriptions    No medications on file       (Please note that portions of this note were completed with a voice recognition program.  Efforts were made to edit the dictations but occasionally words are mis-transcribed.)    Rayne Kearney MD,, MD, F.A.A.E.M.   Attending Emergency Physician                          Rayne Kearney MD  06/28/22 2426

## 2022-06-24 LAB
EKG ATRIAL RATE: 51 BPM
EKG P AXIS: 78 DEGREES
EKG P-R INTERVAL: 216 MS
EKG Q-T INTERVAL: 528 MS
EKG QRS DURATION: 158 MS
EKG QTC CALCULATION (BAZETT): 486 MS
EKG R AXIS: 48 DEGREES
EKG T AXIS: 95 DEGREES
EKG VENTRICULAR RATE: 51 BPM

## 2022-06-27 ENCOUNTER — HOSPITAL ENCOUNTER (OUTPATIENT)
Dept: WOUND CARE | Age: 77
Discharge: HOME | End: 2022-06-28
Payer: MEDICARE

## 2022-06-27 VITALS
HEIGHT: 70 IN | SYSTOLIC BLOOD PRESSURE: 100 MMHG | TEMPERATURE: 96.7 F | BODY MASS INDEX: 26.63 KG/M2 | RESPIRATION RATE: 20 BRPM | WEIGHT: 186 LBS | DIASTOLIC BLOOD PRESSURE: 60 MMHG | HEART RATE: 64 BPM

## 2022-06-27 PROCEDURE — 97597 DBRDMT OPN WND 1ST 20 CM/<: CPT

## 2022-06-27 PROCEDURE — 97597 DBRDMT OPN WND 1ST 20 CM/<: CPT | Performed by: PODIATRIST

## 2022-06-27 RX ORDER — BACITRACIN ZINC AND POLYMYXIN B SULFATE 500; 1000 [USP'U]/G; [USP'U]/G
OINTMENT TOPICAL ONCE
Status: CANCELLED | OUTPATIENT
Start: 2022-06-27 | End: 2022-06-27

## 2022-06-27 RX ORDER — LIDOCAINE HYDROCHLORIDE 20 MG/ML
JELLY TOPICAL ONCE
Status: CANCELLED | OUTPATIENT
Start: 2022-06-27 | End: 2022-06-27

## 2022-06-27 RX ORDER — LIDOCAINE 50 MG/G
OINTMENT TOPICAL ONCE
Status: CANCELLED | OUTPATIENT
Start: 2022-06-27 | End: 2022-06-27

## 2022-06-27 RX ORDER — CLOBETASOL PROPIONATE 0.5 MG/G
OINTMENT TOPICAL ONCE
Status: CANCELLED | OUTPATIENT
Start: 2022-06-27 | End: 2022-06-27

## 2022-06-27 RX ORDER — LIDOCAINE 40 MG/G
CREAM TOPICAL ONCE
Status: CANCELLED | OUTPATIENT
Start: 2022-06-27 | End: 2022-06-27

## 2022-06-27 RX ORDER — BACITRACIN, NEOMYCIN, POLYMYXIN B 400; 3.5; 5 [USP'U]/G; MG/G; [USP'U]/G
OINTMENT TOPICAL ONCE
Status: CANCELLED | OUTPATIENT
Start: 2022-06-27 | End: 2022-06-27

## 2022-06-27 RX ORDER — BETAMETHASONE DIPROPIONATE 0.05 %
OINTMENT (GRAM) TOPICAL ONCE
Status: CANCELLED | OUTPATIENT
Start: 2022-06-27 | End: 2022-06-27

## 2022-06-27 RX ORDER — LIDOCAINE HYDROCHLORIDE 40 MG/ML
SOLUTION TOPICAL ONCE
Status: CANCELLED | OUTPATIENT
Start: 2022-06-27 | End: 2022-06-27

## 2022-06-27 RX ORDER — GINSENG 100 MG
CAPSULE ORAL ONCE
Status: CANCELLED | OUTPATIENT
Start: 2022-06-27 | End: 2022-06-27

## 2022-06-27 RX ORDER — GENTAMICIN SULFATE 1 MG/G
OINTMENT TOPICAL ONCE
Status: CANCELLED | OUTPATIENT
Start: 2022-06-27 | End: 2022-06-27

## 2022-06-27 NOTE — PROGRESS NOTES
Subjective:    Rowena Rose is a 68 y.o. male who presents with the ulceration of the R big toe. . Patient has been compliant with off loading. Patient was recently hospital for kidney function. Patient relates pain is Present but mild. Currently denies F/C/N/V. Overall diabetic control is not changed. Allergies   Allergen Reactions    Simvastatin Headaches       Past Medical History:   Diagnosis Date    Cerebrovascular accident (CVA) due to embolism of cerebral artery ([facility])     CKD (chronic kidney disease) stage 4, GFR 15-29 ml/min ([facility])     Coronary artery disease involving coronary bypass graft of native heart without angina pectoris     H/O lower gastrointestinal bleeding     Tobacco abuse     Type II or unspecified type diabetes mellitus without mention of complication, not stated as uncontrolled        Prior to Admission medications    Medication Sig Start Date End Date Taking?  Authorizing Provider   furosemide (LASIX) 40 MG tablet Take 1 tablet by mouth 2 times daily 5/13/22   Rosenda Talley MD   Multiple Vitamins-Minerals (PRESERVISION AREDS 2 PO) Take 1 tablet by mouth 2 times daily     Historical Provider, MD   acetaminophen (TYLENOL) 325 MG tablet Take 650 mg by mouth every 6 hours as needed    Historical Provider, MD   albuterol (PROVENTIL) (2.5 MG/3ML) 0.083% nebulizer solution Inhale 2.5 mg into the lungs every 6 hours as needed     Historical Provider, MD   amiodarone (CORDARONE) 200 MG tablet Take 200 mg by mouth daily 2/24/22   Historical Provider, MD   apixaban (ELIQUIS) 5 MG TABS tablet Take 5 mg by mouth 2 times daily 3/14/22   Historical Provider, MD   aspirin 81 MG EC tablet Take 81 mg by mouth daily    Historical Provider, MD   atorvastatin (LIPITOR) 40 MG tablet Take 40 mg by mouth nightly  9/30/21   Historical Provider, MD   bisacodyl (DULCOLAX) 10 MG suppository Place 10 mg rectally daily     Historical Provider, MD   ergocalciferol (ERGOCALCIFEROL) 1.25 MG (71716 UT) capsule TAKE ONE CAPSULE BY MOUTH ONCE EVERY WEEK FOR VITAMIN (D) DEFICIENCY 10/12/21   Historical Provider, MD   famotidine (PEPCID) 20 MG tablet Take 20 mg by mouth 2 times daily  22   Historical Provider, MD   hydrALAZINE (APRESOLINE) 10 MG tablet Take 10 mg by mouth 3 times daily 22   Historical Provider, MD   insulin lispro, 1 Unit Dial, 100 UNIT/ML SOPN Per s/s 22   Historical Provider, MD   isosorbide dinitrate (ISORDIL) 10 MG tablet Take 10 mg by mouth 3 times daily 22   Historical Provider, MD   magnesium oxide (MAG-OX) 400 (240 Mg) MG tablet Take 200 mg by mouth daily 3/31/22   Historical Provider, MD   metoprolol succinate (TOPROL XL) 25 MG extended release tablet Take 25 mg by mouth daily 21   Historical Provider, MD   QUEtiapine (SEROQUEL) 25 MG tablet Take 25 mg by mouth 22   Historical Provider, MD   atorvastatin (LIPITOR) 80 MG tablet Take 80 mg by mouth daily    Historical Provider, MD   GLIPIZIDE PO Take 5 mg by mouth daily     Historical Provider, MD   metFORMIN (GLUCOPHAGE) 850 MG tablet Take 850 mg by mouth 2 times daily (with meals)     Historical Provider, MD       Social History     Tobacco Use    Smoking status: Former Smoker     Packs/day: 1.50     Types: Cigarettes     Quit date: 2022     Years since quittin.3    Smokeless tobacco: Never Used   Substance Use Topics    Alcohol use: No       ROS: All 14 ROS systems reviewed and pertinent positives noted above, all others negative. Lower Extremity Physical Examination:     Vitals:   Vitals:    22 0825   BP: 100/60   Pulse: 64   Resp: 20   Temp: (!) 96.7 °F (35.9 °C)     General: AAO x 3 in NAD. Vascular: DP and PT pulses palpable 1/4, bilateral.  CFT >5 seconds, bilateral.  Hair growth absent to the level of the digits, bilateral.  Edema present, bilateral.  Varicosities present, bilateral. Erythema absent, bilateral. Distal Rubor present bilateral toes.   Temperature decreased bilateral. Hyperpigmentation present bilateral. Atrophic skin yes. Neurological: Sensation intact to light touch to level of digits, bilateral.  Protective sensation intact 10/10 sites via 5.07/10g [place] Monofilament, bilateral.  negative Tinel's, bilateral.  negative Valleix sign, bilateral.  Vibratory intact bilateral.  Reflexes Decreased bilateral.  Paresthesias negative. Dysthesias negative. Sharp/dull intact bilateral.    Musculoskeletal: Muscle strength 5/5, bilateral.  Pain present upon palpation R hallux. Normal medial longitudinal arch, bilateral.  Ankle ROM decreased,bilateral.  1st MPJ ROM within normal limits, bilateral.  Dorsally contracted digits absent. No other foot deformities. Integument:   Open lesion present, Right. Ulcer medial right hallux with positive eschar formation. Very stable, well as able to be debrided slightly. There is no red granular tissue whatsoever. No undermining no probing no malodor no signs infection on periphery no proximal streaking. Interdigital maceration absent to web spaces , Bilateral.  Nails b/l thickened, dystrophic and crumbly, discolored with subungual debris. Fissures absent, Bilateral. Hyperkeratotic tissue is absent.    Wound 05/05/22 # right great toe (Active)   Wound Image   05/31/22 1310   Wound Etiology Diabetic 06/27/22 0828   Dressing Status Dry 06/27/22 0828   Wound Cleansed Cleansed with saline 06/27/22 0828   Dressing/Treatment Betadine swabs/povidone iodine 06/27/22 0828   Offloading for Diabetic Foot Ulcers Diabetic shoes/inserts 06/27/22 0828   Dressing Change Due 06/28/22 06/27/22 0828   Wound Length (cm) 3.5 cm 06/27/22 0828   Wound Width (cm) 2.4 cm 06/27/22 0828   Wound Depth (cm) 0.1 cm 06/27/22 0828   Wound Surface Area (cm^2) 8.4 cm^2 06/27/22 0828   Change in Wound Size % (l*w) -47.37 06/27/22 0828   Wound Volume (cm^3) 0.84 cm^3 06/27/22 0828   Wound Healing % -47 06/27/22 0828   Post-Procedure Length (cm) 3.8 cm 06/27/22 0828 Post-Procedure Width (cm) 2.5 cm 06/27/22 0828   Post-Procedure Depth (cm) 0.1 cm 06/27/22 0828   Post-Procedure Surface Area (cm^2) 9.5 cm^2 06/27/22 0828   Post-Procedure Volume (cm^3) 0.95 cm^3 06/27/22 0828   Wound Assessment Eschar dry 06/27/22 0828   Drainage Amount None 06/27/22 0828   Odor None 06/27/22 0828   Yuli-wound Assessment Dry/flaky 06/27/22 0828   Margins Defined edges 06/27/22 0828   Wound Thickness Description not for Pressure Injury Partial thickness 06/27/22 0828   Number of days: 46         Asessment: Patient is a 68 y.o. male with:   Hospital Problems           Last Modified POA    Uncontrolled type 2 DM with peripheral circulatory disorder ([facility] 6/27/2022 Yes    PAD (peripheral artery disease) ([facility] 6/27/2022 Yes    Skin ulcer of right great toe, limited to breakdown of skin ([facility] 6/27/2022 Yes          Ulcer Classification:  Diabetic ulcer grade 2 C. IDSA  no infection. Plan:   Patient examined and evaluated. Diabetic foot education and exam.  Current condition and treatment options discussed in detail. Topical lidocaine applied to wound. Active wound management took place 100% non excisional with the use of  tissue nippers. All non viable tissue (including epidermis and/or dermis) and bio burden was removed to promote healing. Bleeding was present. Please see chart for exact measurements, if not documented then size was less than 20 sq cm. Today's dressing:betadine bid  DM foot ed and exam  Continue offloading with surgical shoe. Patient was was set up to have vascular intervention. Canceled due to the kidney complications. Patient was recently hospital for his kidney function. Patient encouraged to follow back up with vascular surgery also. Stressed importance of this in regards to wound healing. Contact clinic with any questions/problems/concerns or new signs of infection. Follow-up at [facility] in 2 week(s).

## 2022-06-28 ENCOUNTER — TELEPHONE (OUTPATIENT)
Dept: PODIATRY | Age: 77
End: 2022-06-28

## 2022-06-28 ENCOUNTER — TELEPHONE (OUTPATIENT)
Dept: NEPHROLOGY | Age: 77
End: 2022-06-28

## 2022-06-28 ENCOUNTER — HOSPITAL ENCOUNTER (OUTPATIENT)
Dept: LAB | Age: 77
Discharge: HOME | End: 2022-06-28
Payer: MEDICARE

## 2022-06-28 DIAGNOSIS — N18.32 STAGE 3B CHRONIC KIDNEY DISEASE (HCC): ICD-10-CM

## 2022-06-28 LAB
ANION GAP SERPL CALCULATED.3IONS-SCNC: 18 MMOL/L (ref 9–17)
BUN BLDV-MCNC: 85 MG/DL (ref 8–23)
BUN/CREAT BLD: 30 (ref 9–20)
CALCIUM SERPL-MCNC: 8.9 MG/DL (ref 8.6–10.4)
CHLORIDE BLD-SCNC: 96 MMOL/L (ref 98–107)
CO2: 19 MMOL/L (ref 20–31)
CREAT SERPL-MCNC: 2.84 MG/DL (ref 0.7–1.2)
GFR AFRICAN AMERICAN: 26 ML/MIN
GFR NON-AFRICAN AMERICAN: 22 ML/MIN
GFR SERPL CREATININE-BSD FRML MDRD: ABNORMAL ML/MIN/{1.73_M2}
GLUCOSE BLD-MCNC: 179 MG/DL (ref 70–99)
POTASSIUM SERPL-SCNC: 3.8 MMOL/L (ref 3.7–5.3)
SODIUM BLD-SCNC: 133 MMOL/L (ref 135–144)

## 2022-06-28 PROCEDURE — 80048 BASIC METABOLIC PNL TOTAL CA: CPT

## 2022-06-28 PROCEDURE — 36415 COLL VENOUS BLD VENIPUNCTURE: CPT

## 2022-06-28 NOTE — TELEPHONE ENCOUNTER
Writer called Dr. Federico Shone and informed him of critical Bun and he wants labs drawn 7/5 and see him 7/11 for evaluation. Wife informed and voiced understanding.

## 2022-06-28 NOTE — TELEPHONE ENCOUNTER
Received call from the lab regarding critical BMP. BMP of 85 repeated back and verified. Nephrology notified, patients wife also updated.

## 2022-06-29 ENCOUNTER — HOSPITAL ENCOUNTER (EMERGENCY)
Age: 77
Discharge: ANOTHER HOSPITAL | End: 2022-06-29
Attending: EMERGENCY MEDICINE
Payer: MEDICARE

## 2022-06-29 ENCOUNTER — HOSPITAL ENCOUNTER (INPATIENT)
Age: 77
LOS: 3 days | Discharge: HOME HEALTH | DRG: 378 | End: 2022-07-02
Attending: EMERGENCY MEDICINE
Payer: OTHER GOVERNMENT

## 2022-06-29 ENCOUNTER — TELEPHONE (OUTPATIENT)
Dept: FAMILY MEDICINE CLINIC | Age: 77
End: 2022-06-29

## 2022-06-29 ENCOUNTER — APPOINTMENT (OUTPATIENT)
Dept: GENERAL RADIOLOGY | Age: 77
End: 2022-06-29
Payer: MEDICARE

## 2022-06-29 ENCOUNTER — APPOINTMENT (OUTPATIENT)
Dept: GENERAL RADIOLOGY | Age: 77
DRG: 378 | End: 2022-06-29
Payer: OTHER GOVERNMENT

## 2022-06-29 ENCOUNTER — TELEPHONE (OUTPATIENT)
Dept: NEPHROLOGY | Age: 77
End: 2022-06-29

## 2022-06-29 VITALS
WEIGHT: 183 LBS | OXYGEN SATURATION: 100 % | DIASTOLIC BLOOD PRESSURE: 46 MMHG | HEIGHT: 70 IN | HEART RATE: 55 BPM | SYSTOLIC BLOOD PRESSURE: 117 MMHG | BODY MASS INDEX: 26.2 KG/M2 | RESPIRATION RATE: 17 BRPM | TEMPERATURE: 97.3 F

## 2022-06-29 DIAGNOSIS — K92.2 GASTROINTESTINAL HEMORRHAGE, UNSPECIFIED GASTROINTESTINAL HEMORRHAGE TYPE: Primary | ICD-10-CM

## 2022-06-29 DIAGNOSIS — D64.9 SYMPTOMATIC ANEMIA: Primary | ICD-10-CM

## 2022-06-29 PROBLEM — K62.5 RECTAL BLEEDING: Status: ACTIVE | Noted: 2022-06-29

## 2022-06-29 LAB
ABSOLUTE EOS #: 0.17 K/UL (ref 0–0.44)
ABSOLUTE IMMATURE GRANULOCYTE: 0.04 K/UL (ref 0–0.3)
ABSOLUTE LYMPH #: 1.28 K/UL (ref 1.1–3.7)
ABSOLUTE MONO #: 0.66 K/UL (ref 0.1–1.2)
ALBUMIN SERPL-MCNC: 3.3 G/DL (ref 3.5–5.2)
ALBUMIN SERPL-MCNC: 3.3 G/DL (ref 3.5–5.2)
ALBUMIN/GLOBULIN RATIO: 1.4 (ref 1–2.5)
ALBUMIN/GLOBULIN RATIO: 1.6 (ref 1–2.5)
ALP BLD-CCNC: 67 U/L (ref 40–129)
ALP BLD-CCNC: 74 U/L (ref 40–129)
ALT SERPL-CCNC: 12 U/L (ref 5–41)
ALT SERPL-CCNC: 12 U/L (ref 5–41)
ANION GAP SERPL CALCULATED.3IONS-SCNC: 14 MMOL/L (ref 9–17)
AST SERPL-CCNC: 18 U/L
AST SERPL-CCNC: 25 U/L
BASOPHILS # BLD: 0 % (ref 0–2)
BASOPHILS ABSOLUTE: <0.03 K/UL (ref 0–0.2)
BILIRUB SERPL-MCNC: 0.19 MG/DL (ref 0.3–1.2)
BILIRUB SERPL-MCNC: 0.27 MG/DL (ref 0.3–1.2)
BILIRUBIN DIRECT: 0.09 MG/DL
BILIRUBIN, INDIRECT: 0.18 MG/DL (ref 0–1)
BUN BLDV-MCNC: 81 MG/DL (ref 8–23)
BUN/CREAT BLD: 28 (ref 9–20)
CALCIUM SERPL-MCNC: 8.7 MG/DL (ref 8.6–10.4)
CHLORIDE BLD-SCNC: 95 MMOL/L (ref 98–107)
CHP ED QC CHECK: NORMAL
CHP ED QC CHECK: NORMAL
CO2: 21 MMOL/L (ref 20–31)
CREAT SERPL-MCNC: 2.91 MG/DL (ref 0.7–1.2)
EKG ATRIAL RATE: 48 BPM
EKG P AXIS: 71 DEGREES
EKG P-R INTERVAL: 192 MS
EKG Q-T INTERVAL: 488 MS
EKG QRS DURATION: 142 MS
EKG QTC CALCULATION (BAZETT): 435 MS
EKG R AXIS: 104 DEGREES
EKG T AXIS: -56 DEGREES
EKG VENTRICULAR RATE: 48 BPM
EOSINOPHILS RELATIVE PERCENT: 2 % (ref 1–4)
GFR AFRICAN AMERICAN: 26 ML/MIN
GFR NON-AFRICAN AMERICAN: 21 ML/MIN
GFR SERPL CREATININE-BSD FRML MDRD: ABNORMAL ML/MIN/{1.73_M2}
GLUCOSE BLD-MCNC: 187 MG/DL (ref 70–99)
HCT VFR BLD CALC: 15.7 % (ref 40.7–50.3)
HCT VFR BLD CALC: 22.4 % (ref 40.7–50.3)
HCT VFR BLD CALC: 23.1 % (ref 40.7–50.3)
HEMOCCULT STL QL: POSITIVE
HEMOCCULT STL QL: POSITIVE
HEMOGLOBIN: 4.9 G/DL (ref 13–17)
HEMOGLOBIN: 7.1 G/DL (ref 13–17)
HEMOGLOBIN: 7.1 G/DL (ref 13–17)
IMMATURE GRANULOCYTES: 1 %
INR BLD: 1.9
LACTIC ACID, SEPSIS: 1.6 MMOL/L (ref 0.5–1.9)
LACTIC ACID, SEPSIS: 2.4 MMOL/L (ref 0.5–1.9)
LYMPHOCYTES # BLD: 16 % (ref 24–43)
MCH RBC QN AUTO: 26.1 PG (ref 25.2–33.5)
MCHC RBC AUTO-ENTMCNC: 31.2 G/DL (ref 25.2–33.5)
MCV RBC AUTO: 83.5 FL (ref 82.6–102.9)
MONOCYTES # BLD: 8 % (ref 3–12)
NRBC AUTOMATED: 0 PER 100 WBC
PARTIAL THROMBOPLASTIN TIME: 36.9 SEC (ref 23.9–33.8)
PDW BLD-RTO: 18.2 % (ref 11.8–14.4)
PLATELET # BLD: 262 K/UL (ref 138–453)
PMV BLD AUTO: 10.2 FL (ref 8.1–13.5)
POTASSIUM SERPL-SCNC: 4.4 MMOL/L (ref 3.7–5.3)
PRO-BNP: 4184 PG/ML
PROTHROMBIN TIME: 21 SEC (ref 11.5–14.2)
RBC # BLD: 1.88 M/UL (ref 4.21–5.77)
RBC # BLD: ABNORMAL 10*6/UL
SEG NEUTROPHILS: 73 % (ref 36–65)
SEGMENTED NEUTROPHILS ABSOLUTE COUNT: 5.86 K/UL (ref 1.5–8.1)
SODIUM BLD-SCNC: 130 MMOL/L (ref 135–144)
TOTAL PROTEIN: 5.4 G/DL (ref 6.4–8.3)
TOTAL PROTEIN: 5.6 G/DL (ref 6.4–8.3)
TROPONIN, HIGH SENSITIVITY: 90 NG/L (ref 0–22)
TROPONIN, HIGH SENSITIVITY: 97 NG/L (ref 0–22)
WBC # BLD: 8 K/UL (ref 3.5–11.3)

## 2022-06-29 PROCEDURE — 83880 ASSAY OF NATRIURETIC PEPTIDE: CPT

## 2022-06-29 PROCEDURE — 93005 ELECTROCARDIOGRAM TRACING: CPT | Performed by: INTERNAL MEDICINE

## 2022-06-29 PROCEDURE — 6360000002 HC RX W HCPCS: Performed by: HEALTH CARE PROVIDER

## 2022-06-29 PROCEDURE — 99285 EMERGENCY DEPT VISIT HI MDM: CPT

## 2022-06-29 PROCEDURE — 2580000003 HC RX 258: Performed by: STUDENT IN AN ORGANIZED HEALTH CARE EDUCATION/TRAINING PROGRAM

## 2022-06-29 PROCEDURE — 73630 X-RAY EXAM OF FOOT: CPT

## 2022-06-29 PROCEDURE — 80076 HEPATIC FUNCTION PANEL: CPT

## 2022-06-29 PROCEDURE — 85730 THROMBOPLASTIN TIME PARTIAL: CPT

## 2022-06-29 PROCEDURE — A4216 STERILE WATER/SALINE, 10 ML: HCPCS | Performed by: EMERGENCY MEDICINE

## 2022-06-29 PROCEDURE — 87040 BLOOD CULTURE FOR BACTERIA: CPT

## 2022-06-29 PROCEDURE — 87641 MR-STAPH DNA AMP PROBE: CPT

## 2022-06-29 PROCEDURE — 86920 COMPATIBILITY TEST SPIN: CPT

## 2022-06-29 PROCEDURE — 71045 X-RAY EXAM CHEST 1 VIEW: CPT

## 2022-06-29 PROCEDURE — C9113 INJ PANTOPRAZOLE SODIUM, VIA: HCPCS | Performed by: HEALTH CARE PROVIDER

## 2022-06-29 PROCEDURE — 85018 HEMOGLOBIN: CPT

## 2022-06-29 PROCEDURE — P9016 RBC LEUKOCYTES REDUCED: HCPCS

## 2022-06-29 PROCEDURE — 80053 COMPREHEN METABOLIC PANEL: CPT

## 2022-06-29 PROCEDURE — 86850 RBC ANTIBODY SCREEN: CPT

## 2022-06-29 PROCEDURE — 2580000003 HC RX 258: Performed by: HEALTH CARE PROVIDER

## 2022-06-29 PROCEDURE — 86901 BLOOD TYPING SEROLOGIC RH(D): CPT

## 2022-06-29 PROCEDURE — 36415 COLL VENOUS BLD VENIPUNCTURE: CPT

## 2022-06-29 PROCEDURE — 85014 HEMATOCRIT: CPT

## 2022-06-29 PROCEDURE — A4216 STERILE WATER/SALINE, 10 ML: HCPCS | Performed by: HEALTH CARE PROVIDER

## 2022-06-29 PROCEDURE — 85025 COMPLETE CBC W/AUTO DIFF WBC: CPT

## 2022-06-29 PROCEDURE — 6360000002 HC RX W HCPCS: Performed by: EMERGENCY MEDICINE

## 2022-06-29 PROCEDURE — 83605 ASSAY OF LACTIC ACID: CPT

## 2022-06-29 PROCEDURE — 85610 PROTHROMBIN TIME: CPT

## 2022-06-29 PROCEDURE — 84484 ASSAY OF TROPONIN QUANT: CPT

## 2022-06-29 PROCEDURE — 96374 THER/PROPH/DIAG INJ IV PUSH: CPT

## 2022-06-29 PROCEDURE — C9113 INJ PANTOPRAZOLE SODIUM, VIA: HCPCS | Performed by: EMERGENCY MEDICINE

## 2022-06-29 PROCEDURE — 86900 BLOOD TYPING SEROLOGIC ABO: CPT

## 2022-06-29 PROCEDURE — 2000000000 HC ICU R&B

## 2022-06-29 PROCEDURE — 36430 TRANSFUSION BLD/BLD COMPNT: CPT

## 2022-06-29 PROCEDURE — 2580000003 HC RX 258: Performed by: EMERGENCY MEDICINE

## 2022-06-29 PROCEDURE — 93005 ELECTROCARDIOGRAM TRACING: CPT | Performed by: EMERGENCY MEDICINE

## 2022-06-29 RX ORDER — SODIUM CHLORIDE 0.9 % (FLUSH) 0.9 %
5-40 SYRINGE (ML) INJECTION EVERY 12 HOURS SCHEDULED
Status: DISCONTINUED | OUTPATIENT
Start: 2022-06-29 | End: 2022-07-02 | Stop reason: HOSPADM

## 2022-06-29 RX ORDER — ACETAMINOPHEN 650 MG/1
650 SUPPOSITORY RECTAL EVERY 6 HOURS PRN
Status: DISCONTINUED | OUTPATIENT
Start: 2022-06-29 | End: 2022-07-02 | Stop reason: HOSPADM

## 2022-06-29 RX ORDER — QUETIAPINE FUMARATE 25 MG/1
25 TABLET, FILM COATED ORAL NIGHTLY
Status: DISCONTINUED | OUTPATIENT
Start: 2022-06-29 | End: 2022-07-02 | Stop reason: HOSPADM

## 2022-06-29 RX ORDER — ONDANSETRON 2 MG/ML
4 INJECTION INTRAMUSCULAR; INTRAVENOUS EVERY 6 HOURS PRN
Status: DISCONTINUED | OUTPATIENT
Start: 2022-06-29 | End: 2022-07-02 | Stop reason: HOSPADM

## 2022-06-29 RX ORDER — ACETAMINOPHEN 325 MG/1
650 TABLET ORAL EVERY 6 HOURS PRN
Status: DISCONTINUED | OUTPATIENT
Start: 2022-06-29 | End: 2022-07-02 | Stop reason: HOSPADM

## 2022-06-29 RX ORDER — DEXTROSE AND SODIUM CHLORIDE 5; .45 G/100ML; G/100ML
INJECTION, SOLUTION INTRAVENOUS CONTINUOUS
Status: ACTIVE | OUTPATIENT
Start: 2022-06-29 | End: 2022-06-30

## 2022-06-29 RX ORDER — AMIODARONE HYDROCHLORIDE 200 MG/1
200 TABLET ORAL DAILY
Status: DISCONTINUED | OUTPATIENT
Start: 2022-06-30 | End: 2022-06-30

## 2022-06-29 RX ORDER — HYDRALAZINE HYDROCHLORIDE 10 MG/1
10 TABLET, FILM COATED ORAL 3 TIMES DAILY
Status: DISCONTINUED | OUTPATIENT
Start: 2022-06-29 | End: 2022-07-02 | Stop reason: HOSPADM

## 2022-06-29 RX ORDER — ATORVASTATIN CALCIUM 40 MG/1
40 TABLET, FILM COATED ORAL NIGHTLY
Status: DISCONTINUED | OUTPATIENT
Start: 2022-06-29 | End: 2022-07-02 | Stop reason: HOSPADM

## 2022-06-29 RX ORDER — SODIUM CHLORIDE 9 MG/ML
INJECTION, SOLUTION INTRAVENOUS PRN
Status: DISCONTINUED | OUTPATIENT
Start: 2022-06-29 | End: 2022-06-29 | Stop reason: HOSPADM

## 2022-06-29 RX ORDER — 0.9 % SODIUM CHLORIDE 0.9 %
500 INTRAVENOUS SOLUTION INTRAVENOUS ONCE
Status: COMPLETED | OUTPATIENT
Start: 2022-06-30 | End: 2022-06-30

## 2022-06-29 RX ORDER — POLYETHYLENE GLYCOL 3350 17 G/17G
17 POWDER, FOR SOLUTION ORAL DAILY PRN
Status: DISCONTINUED | OUTPATIENT
Start: 2022-06-29 | End: 2022-07-02 | Stop reason: HOSPADM

## 2022-06-29 RX ORDER — SODIUM CHLORIDE 0.9 % (FLUSH) 0.9 %
5-40 SYRINGE (ML) INJECTION PRN
Status: DISCONTINUED | OUTPATIENT
Start: 2022-06-29 | End: 2022-07-02 | Stop reason: HOSPADM

## 2022-06-29 RX ORDER — ALBUTEROL SULFATE 2.5 MG/3ML
2.5 SOLUTION RESPIRATORY (INHALATION)
Status: DISCONTINUED | OUTPATIENT
Start: 2022-06-29 | End: 2022-07-02 | Stop reason: HOSPADM

## 2022-06-29 RX ORDER — ONDANSETRON 4 MG/1
4 TABLET, ORALLY DISINTEGRATING ORAL EVERY 8 HOURS PRN
Status: DISCONTINUED | OUTPATIENT
Start: 2022-06-29 | End: 2022-07-02 | Stop reason: HOSPADM

## 2022-06-29 RX ORDER — SODIUM CHLORIDE 9 MG/ML
INJECTION, SOLUTION INTRAVENOUS PRN
Status: DISCONTINUED | OUTPATIENT
Start: 2022-06-29 | End: 2022-07-02 | Stop reason: HOSPADM

## 2022-06-29 RX ADMIN — DEXTROSE AND SODIUM CHLORIDE: 5; 450 INJECTION, SOLUTION INTRAVENOUS at 22:28

## 2022-06-29 RX ADMIN — CEFTRIAXONE SODIUM 1000 MG: 1 INJECTION, POWDER, FOR SOLUTION INTRAMUSCULAR; INTRAVENOUS at 19:11

## 2022-06-29 RX ADMIN — SODIUM CHLORIDE 8 MG/HR: 9 INJECTION, SOLUTION INTRAVENOUS at 21:22

## 2022-06-29 RX ADMIN — SODIUM CHLORIDE 80 MG: 9 INJECTION, SOLUTION INTRAVENOUS at 19:27

## 2022-06-29 RX ADMIN — OCTREOTIDE ACETATE 25 MCG/HR: 500 INJECTION, SOLUTION INTRAVENOUS; SUBCUTANEOUS at 19:29

## 2022-06-29 RX ADMIN — SODIUM CHLORIDE 80 MG: 9 INJECTION INTRAMUSCULAR; INTRAVENOUS; SUBCUTANEOUS at 22:49

## 2022-06-29 RX ADMIN — SODIUM CHLORIDE 8 MG/HR: 9 INJECTION, SOLUTION INTRAVENOUS at 19:58

## 2022-06-29 RX ADMIN — SODIUM CHLORIDE 40 MG: 9 INJECTION INTRAMUSCULAR; INTRAVENOUS; SUBCUTANEOUS at 14:27

## 2022-06-29 RX ADMIN — OCTREOTIDE ACETATE 25 MCG/HR: 500 INJECTION, SOLUTION INTRAVENOUS; SUBCUTANEOUS at 21:22

## 2022-06-29 ASSESSMENT — ENCOUNTER SYMPTOMS
SHORTNESS OF BREATH: 0
NAUSEA: 0
TROUBLE SWALLOWING: 0
ABDOMINAL PAIN: 0
DIARRHEA: 0
BLOOD IN STOOL: 0
BACK PAIN: 0
CONSTIPATION: 0
VOMITING: 0
SORE THROAT: 0
WHEEZING: 0

## 2022-06-29 ASSESSMENT — PAIN SCALES - GENERAL: PAINLEVEL_OUTOF10: 4

## 2022-06-29 ASSESSMENT — PAIN - FUNCTIONAL ASSESSMENT: PAIN_FUNCTIONAL_ASSESSMENT: 0-10

## 2022-06-29 ASSESSMENT — PAIN DESCRIPTION - LOCATION: LOCATION: NECK

## 2022-06-29 NOTE — TELEPHONE ENCOUNTER
[place] nurse calling reporting a BP of 80/30 and would like a note to be taken for Dr Jada Brito, advised that Dr Jada Brito is out of office until Tuesday and pt needed to go to ER

## 2022-06-29 NOTE — H&P
Critical Care - History and Physical Examination    Patient's name:  Maryse Butt  Medical Record Number: [id number]  Patient's account/billing number: [de-identified]  Patient's YOB: 1945  Age: 68 y.o. Date of Admission: 6/29/2022  5:58 PM  Reason of ICU admission: Hypotension, GI bleed  Date of History and Physical Examination: 6/29/2022    Primary Care Physician: Erik Dillon MD  Attending Physician: Dr. Estrella Spangler Status: No Order    Chief complaint:   Chief Complaint   Patient presents with    Other     GI bleed ( [place] )       Reason for ICU admission: Hypotension, GI bleed    History Of Present Illness:   History was obtained from spouse and chart review. Maryse Butt is a 68 y.o.  male who initially presented to [facility] emergency department today due to hypotension at home and weakness. Home health nurse today noted the blood pressure was 80/30, was found to have a GI bleed but no abdominal pain, nausea or vomiting. Hemoglobin at [facility] was 4.9 and patient received 2 units of PRBCs. Platelets normal, INR elevated at 1.9. Baseline hemoglobin is 8. He is also found to have prerenal azotemia with BUN/creatinine of 81/2.9. On arrival to the emergency department here, the patient had received 2 unit PRBCs and blood pressure improved to 120s/40s. Medical history significant for type 2 diabetes, hypertension, ischemic cardiomyopathy with EF of 20 to 25%, coronary artery disease status post CABG, atrial fibrillation, CKD stage III. He is on Eliquis for A. fib and wears a LifeVest due to cardiomyopathy, was unable to have AICD placed due to concern for foot infection. In April 2022, the patient was admitted to [facility] due to black tarry stools and was found to have a GI bleed requiring blood transfusion.   He had a colonoscopy done on 4/12/2022 status post cold forceps, cold snare, hot snare polypectomy due to melena and was found to have polyps and Historical Provider, MD   atorvastatin (LIPITOR) 40 MG tablet Take 40 mg by mouth nightly  9/30/21   Historical Provider, MD   bisacodyl (DULCOLAX) 10 MG suppository Place 10 mg rectally daily     Historical Provider, MD   ergocalciferol (ERGOCALCIFEROL) 1.25 MG (33984 UT) capsule TAKE ONE CAPSULE BY MOUTH ONCE EVERY WEEK FOR VITAMIN (D) DEFICIENCY 10/12/21   Historical Provider, MD   famotidine (PEPCID) 20 MG tablet Take 20 mg by mouth 2 times daily  2/24/22   Historical Provider, MD   hydrALAZINE (APRESOLINE) 10 MG tablet Take 10 mg by mouth 3 times daily 4/19/22   Historical Provider, MD   insulin lispro, 1 Unit Dial, 100 UNIT/ML SOPN Per s/s 2/24/22   Historical Provider, MD   isosorbide dinitrate (ISORDIL) 10 MG tablet Take 10 mg by mouth 3 times daily 4/19/22   Historical Provider, MD   magnesium oxide (MAG-OX) 400 (240 Mg) MG tablet Take 200 mg by mouth daily 3/31/22   Historical Provider, MD   metoprolol succinate (TOPROL XL) 25 MG extended release tablet Take 25 mg by mouth daily 9/30/21   Historical Provider, MD   QUEtiapine (SEROQUEL) 25 MG tablet Take 25 mg by mouth 2/24/22   Historical Provider, MD   atorvastatin (LIPITOR) 80 MG tablet Take 80 mg by mouth daily    Historical Provider, MD   GLIPIZIDE PO Take 5 mg by mouth daily     Historical Provider, MD   metFORMIN (GLUCOPHAGE) 850 MG tablet Take 850 mg by mouth 2 times daily (with meals)     Historical Provider, MD       Social History:   TOBACCO:   reports that he quit smoking about 3 months ago. His smoking use included cigarettes. He smoked 1.50 packs per day. He has never used smokeless tobacco.  ETOH:   reports no history of alcohol use. DRUGS:  reports no history of drug use. OCCUPATION:      Family History:   No family history on file.     REVIEW OF SYSTEMS (ROS):  Review of Systems    Physical Exam:    Vitals: BP (!) 121/42   Pulse 53   Temp 97.8 °F (36.6 °C) (Oral)   Resp 17   Ht 5' 10\" (1.778 m)   Wt 180 lb (81.6 kg)   SpO2 100% BMI 25.83 kg/m²     Body weight:   Wt Readings from Last 3 Encounters:   06/29/22 180 lb (81.6 kg)   06/29/22 183 lb (83 kg)   06/27/22 186 lb (84.4 kg)       Body Mass Index : Body mass index is 25.83 kg/m². PHYSICAL EXAMINATION :  Constitutional: WDWN, NAD  EENT: PERRLA, EOMI, sclera clear, anicteric, moist mucous membranes, oropharynx clear, no lesions  Neck: Supple, symmetrical, trachea midline  Respiratory: clear to auscultation, no wheezes, rales, or rhonchi  Cardiovascular: regular rate and rhythm, normal S1, S2, no murmur noted, and 2+ distal pulses in all 4 extremities   Abdomen: soft, nontender, nondistended, no masses or organomegaly  Neurological: Aox3, no focal deficits, moves all 4 extremities spontaneously and purposefully   Extremities:  peripheral pulses normal, no pedal edema, no clubbing or cyanosis    Laboratory findings:-  CBC:   Recent Labs     06/29/22  1341   WBC 8.0   HGB 4.9*        BMP:    Recent Labs     06/28/22  1307 06/28/22  1307 06/29/22  1341   *   < > 130*   K 3.8   < > 4.4   CL 96*   < > 95*   CO2 19*   < > 21   BUN 85*   < > 81*   CREATININE 2.84*  --  2.91*   GLUCOSE 179*   < > 187*    < > = values in this interval not displayed. S. Calcium:  Recent Labs     06/29/22  1341   CALCIUM 8.7     S. Ionized Calcium:No results for input(s): IONCA in the last 72 hours. S. Magnesium:No results for input(s): MG in the last 72 hours. S. Phosphorus:No results for input(s): PHOS in the last 72 hours. S. Glucose:No results for input(s): POCGLU in the last 72 hours. Glycosylated hemoglobin A1C: No results for input(s): LABA1C in the last 72 hours. INR:   Recent Labs     06/29/22  1341   INR 1.9     Hepatic functions:   Recent Labs     06/29/22  1341   ALKPHOS 74   ALT 12   AST 25   PROT 5.6*   BILITOT 0.19*   LABALBU 3.3*     Pancreatic functions:No results for input(s): LACTA, AMYLASE in the last 72 hours.   S. Lactic Acid: No results for input(s): LACTA in the last 72 hours. Cardiac enzymes:No results for input(s): CKTOTAL, CKMB, CKMBINDEX, TROPONINI in the last 72 hours. BNP:No results for input(s): BNP in the last 72 hours. Lipid profile: No results for input(s): CHOL, TRIG, HDL, LDL, LDLCALC in the last 72 hours. Blood Gases: No results found for: PH, PCO2, PO2, HCO3, O2SAT  Thyroid functions: No results found for: T4, TSH     Microbiology:  Cultures during this admission:   Blood cultures:                 [x] None drawn      [] Negative             []  Positive (Details:  )  Urine Culture:                   [x] None drawn      [] Negative             []  Positive (Details:  )  Sputum Culture:               [x] None drawn       [] Negative             []  Positive (Details:  )   Endotracheal aspirate:     [x] None drawn       [] Negative             []  Positive (Details:  )   -----------------------------------------------------------------  Radiological reports:    CXR:  Limited left basilar assessment, left retrocardiac density is suspected which   may be related to a pleural and/or parenchymal process.  Upright PA and   lateral views of the chest are suggested for more accurate assessment of the   left lung base           Electrocardiogram: EKG: sinus bradycardia.     Last Echocardiogram findings:     Assessment and Plan     Patient Active Problem List   Diagnosis    Type 2 diabetes mellitus with complication ([address]    Tobacco abuse    CKD (chronic kidney disease) stage 4, GFR 15-29 ml/min ([facility]    Uncontrolled type 2 DM with peripheral circulatory disorder ([address]    PAD (peripheral artery disease) ([address]    Skin ulcer of right great toe, limited to breakdown of skin ([address]    Atherosclerosis of native arteries of extremities with intermittent claudication, bilateral legs ([address]       Additional assessment:  Maryse Butt is a 68 y.o. male who intially presented on 6/29/2022 for hypotension and weakness, found to have a hemoglobin of 4.9 and GI bleed with melanotic stools. LAUREN with creatinine 2.9, baseline creatinine 2 CKD stage III. Troponin 97, baseline in the 70s. History of ischemic cardiomyopathy with EF 20 to 25%, coronary artery disease status post CABG, and history of recent GI bleed in April 2022. PLAN/MEDICAL DECISION MAKING:  Neurologic:   · Neuro checks per protocol  · Sedation: no sedation  · Pain control: fentanyl as needed  Cardiovascular:  · HR: 53-57  · MAP: 60-83  · MAP goal >65  · History of ischemic cardiomyopathy with EF 20-25%; [place] in place  · Initially hypotensive likely secondary to hypovolemia. Blood pressure did improve after 2 unit PRBC  · History of coronary artery disease status post CABG in February 2022  · Troponin 97 down to 90; baseline in the 70s; suspect demand ischemia secondary to blood loss anemia  · History of atrial fibrillation on Eliquis and amiodarone; will hold both medications due to bleeding risk and bradycardia  · Cardiology consulted, appreciate recommendations  Pulmonary:  · Maintain oxygen sats >92%  · Pulmonary toilet  · Most recent CXR as mentioned above  GI/Nutrition  · daily documentation of bowel movement  · Ulcer Prophylaxis: PPI  · Diet:No diet orders on file   · Melanotic stools with associated anemia and hypotension; gastroenterology consulted in the emergency department  · Patient receiving Protonix gtt., octreotide, Rocephin; ok to dc rocephin and octreotide as patient has no PMHx cirrhosis or esophageal varices, no hematemesis   · Patient had an admission to [facility] in April 2022 due to melanotic stools causing anemia requiring blood transfusion and colonoscopy  Renal/Fluid/Electrolyte  · IV Fluids: D5 1/2 @ 75 mL/Hr   · I/O: No intake/output data recorded.   · BUN/Cr: 81/2.91  · Pre-renal azotemia likely 2/2 hypotension  · Monitor electrolytes, replace PRN   · CKD III Baseline Cr 2, suspect LAUREN 2/2 hypoperfusion; patient making good UOP currently, will continue to monitor renal function  ID  · WBC 8.0  · Tmax: Temp (24hrs), Av.9 °F (36.1 °C), Min:96.1 °F (35.6 °C), Max:97.8 °F (36.6 °C)  ·   Hematology:  · H/H  4.9/15.7 (8.1/25.6 1 wk ago)  · S/p 2u PRBC transfusion for GI bleed  · Repeat H/H post-transfusion pending  · H/H q6h  · Transfuse to maintain Hgb > 7  · Endocrine:   glucose controlled - most recent BGL is   Recent Labs     22  1307 22  1341   GLUCOSE 179* 187*   ·   DVT Prophylaxis  · SCD sleeves -thigh high and No chemoprophylaxis anticoagulation at this time. Discharge Needs:  PT, OT, ST, SW and Case Management      CODE STATUS: Full Code    DISPOSITION:  [x] To remain ICU:   [] OK for out of ICU from  [facility] Carmen Jacobo Gold MD  Emergency Medicine Resident, PGY2  Critical Care Service   22 6:33 PM      Attending Physician Statement  I have discussed the care of [facility], including pertinent history and exam findings with the resident. I have reviewed the key elements of all parts of the encounter with the resident. I have seen and examined the patient with the resident. I agree with the assessment and plan and status of the problem list as documented. I seen the patient during the round this morning, chart reviewed, labs and other events noted. Patient history of diabetes, hypertension, ischemic cardiomyopathy with ejection fraction of 25%, history of CABG he has a LifeVest apparently was evaluated in the past but had foot infection. History of atrial fibrillation on Eliquis, chronic kidney disease. He presented apparently found by home care having melena was hypotensive and slight mental status changes/confusion. Presented to emergency room found to have a hemoglobin of 4.9 apparently baseline is around 8. He had to received transfusion 2 units posttransfusion hemoglobin was 6.8. His INR was 1.9. Chest x-ray did not show infiltrate shows cardiomegaly.   He had been hypotensive responded to fluid bolus and transfusion currently blood pressure is stable. He also have chronic kidney disease with creatinine around 2 at baseline currently creatinine was 2.91 and improving to 2.43 this morning BUN was 85 improved to 70 AST ALT is normal.  He had received 2 more units and hemoglobin is 8.1. We will continue to follow hemoglobin hematocrit. Follow-up urine output renal function. GI has been consulted plan for endoscopy. Will repeat INR again today. Continue with PPI drip. CT scan of the abdomen. Will hold all antihypertensive medication and diuretics. He is on amiodarone 100 mg once daily will resume later. Continue to follow blood sugar  Hold any diuretic antihypertensive, hydralazine and beta-blocker. Discussed with ICU nursing staff, treatment and plan discussed. Total critical care time caring for this patient with life threatening, unstable organ failure, including direct patient contact, management of life support systems, review of data including imaging and labs, discussions with other team members and physicians at least 45 min so far today, excluding procedures. Please note that this chart was generated using voice recognition Dragon dictation software. Although every effort was made to ensure the accuracy of this automated transcription, some errors in transcription may have occurred.      Antonio Tello MD  6/30/2022 9:40 AM

## 2022-06-29 NOTE — ED PROVIDER NOTES
[address] [facility]     Emergency Department     Faculty Attestation        I performed a history and physical examination of the patient and discussed management with the resident. I reviewed the residents note and agree with the documented findings and plan of care. Any areas of disagreement are noted on the chart. I was personally present for the key portions of any procedures. I have documented in the chart those procedures where I was not present during the key portions. I have reviewed the emergency nurses triage note. I agree with the chief complaint, past medical history, past surgical history, allergies, medications, social and family history as documented unless otherwise noted below. For Physician Assistant/ Nurse Practitioner cases/documentation I have personally evaluated this patient and have completed at least one if not all key elements of the E/M (history, physical exam, and MDM). Additional findings are as noted. PCP:  Melisa Yi MD    Pertinent Comments:     Patient is a 80-year-old male with history of atrial fibrillation anticoagulated on Eliquis here as a transfer from [facility] ER for GI bleed but no abdominal pain. Hemoglobin is 4.9 and has gotten 2 units of PRBCs. Baseline hemoglobin of 8. Patient found to have some prerenal azotemia as well with BUN/creatinine of 81/2.9. Bicarb normal at 21 and potassium normal at 4.4. Platelets normal at 855,988 but INR elevated at 1.9. Of note, patient with cardiomyopathy as well with a EF of 20 to 25% wearing LifeVest at this time as well. Patient is here for further work-up and GI consultation.           EKG Interpretation    Interpreted by emergency department physician    Rhythm: normal sinus   Rate: Mildly bradycardic at 53 bpm  Axis: normal  Conduction: Left bundle branch block  ST Segments: no acute change  T Waves: no acute change  Q Waves: no acute change    Clinical Impression:  nonspecific EKG with left bundle branch block and appear essentially unchanged in overall morphology when compared to previous EKG on 6/22/2022          CRITICAL CARE: There was a high probability of clinically significant/life threatening deterioration in this patient's condition which required my urgent intervention. Total critical care time was 30 minutes. This excludes any time for separately reportable procedures. This patient was evaluated in the Emergency Department for symptoms described in the history of present illness. He/she was evaluated in the context of the global COVID-19 pandemic, which necessitated consideration that the patient might be at risk for infection with the SARS-CoV-2 virus that causes COVID-19. Institutional protocols and algorithms that pertain to the evaluation of patients at risk for COVID-19 are in a state of rapid change based on information released by regulatory bodies including the CDC and federal and state organizations. These policies and algorithms were followed during the patient's care in the ED. (Please note that portions of this note were completed with a voice recognition program. Efforts were made to edit the dictations but occasionally words are mis-transcribed.  Whenever words are used in this note in any gender, they shall be construed as though they were used in the gender appropriate to the circumstances; and whenever words are used in this note in the singular or plural form, they shall be construed as though they were used in the form appropriate to the circumstances.)    MD Oxana Aguilera  Attending Emergency Medicine Physician             Tali Butler MD  06/29/22 [id number]       Tali Butler MD  06/29/22 [address] [facility] [address], [place]  06/29/22 [id number]

## 2022-06-29 NOTE — ED PROVIDER NOTES
[facility] ED  Emergency Department Encounter  EmergencyMedicine Resident     Pt Name:Milan Castañeda  MRN: [id number]  Shugfiain 1945  Date of evaluation: 6/29/22  PCP:  Lele Burrell MD    This patient was evaluated in the Emergency Department for symptoms described in the history of present illness. The patient was evaluated in the context of the global COVID-19 pandemic, which necessitated consideration that the patient might be at risk for infection with the SARS-CoV-2 virus that causes COVID-19. Institutional protocols and algorithms that pertain to the evaluation of patients at risk for COVID-19 are in a state of rapid change based on information released by regulatory bodies including the CDC and federal and state organizations. These policies and algorithms were followed during the patient's care in the ED. CHIEF COMPLAINT       No chief complaint on file. GI bleed    HISTORY OF PRESENT ILLNESS  (Location/Symptom, Timing/Onset, Context/Setting, Quality, Duration, Modifying Factors, Severity.)      Viviana Maxwell is a 68 y.o. male on [facility] for A. fib who presents via EMS as a transfer from outside hospital.  He had presented for lightheadedness and fatigue and was found to have a hemoglobin of 4.9 from his baseline of 8.1one-week prior. He also has CHF and is on a LifeVest for a EF of 20 to 25%. He received 1 unit of packed red blood cells at the outside hospital but still had a low blood pressure with maps in the 50s. A second unit was hung just prior to transport and completed just prior to arrival.  His blood pressure is now 121/42 and he is mentating well. He denies any abdominal pain. No hematuria tendinosis, he does report maybe a few episodes of dark stools. Once his wife arrives we obtain further history of a large lower bleed on a prior admission to [place] [id number] several months ago. She reports multiple polyps removed at that time.     PAST MEDICAL / SURGICAL / SOCIAL / FAMILY HISTORY      has a past medical history of Cerebrovascular accident (CVA) due to embolism of cerebral artery ([facility] [address] CKD (chronic kidney disease) stage 4, GFR 15-29 ml/min ([facility] [address] Coronary artery disease involving coronary bypass graft of native heart without angina pectoris, H/O lower gastrointestinal bleeding, Tobacco abuse, and Type II or unspecified type diabetes mellitus without mention of complication, not stated as uncontrolled. has a past surgical history that includes Cardiac surgery. Social History     Socioeconomic History    Marital status:      Spouse name: Not on file    Number of children: Not on file    Years of education: Not on file    Highest education level: Not on file   Occupational History    Not on file   Tobacco Use    Smoking status: Former Smoker     Packs/day: 1.50     Types: Cigarettes     Quit date: 2022     Years since quittin.3    Smokeless tobacco: Never Used   Substance and Sexual Activity    Alcohol use: No    Drug use: No    Sexual activity: Not on file   Other Topics Concern    Not on file   Social History Narrative    Not on file     Social Determinants of Health     Financial Resource Strain: Low Risk     Difficulty of Paying Living Expenses: Not hard at all   Food Insecurity: No Food Insecurity    Worried About [facility] in the Last Year: Never true    [address] in the Last Year: Never true   Transportation Needs:     Lack of Transportation (Medical): Not on file    Lack of Transportation (Non-Medical):  Not on file   Physical Activity:     Days of Exercise per Week: Not on file    Minutes of Exercise per Session: Not on file   Stress:     Feeling of Stress : Not on file   Social Connections:     Frequency of Communication with Friends and Family: Not on file    Frequency of Social Gatherings with Friends and Family: Not on file    Attends Temple Services: Not on file    Active Member of Clubs or Organizations: Not on file    Attends Club or Organization Meetings: Not on file    Marital Status: Not on file   Intimate Partner Violence:     Fear of Current or Ex-Partner: Not on file    Emotionally Abused: Not on file    Physically Abused: Not on file    Sexually Abused: Not on file   Housing Stability:     Unable to Pay for Housing in the Last Year: Not on file    Number of [place] in the Last Year: Not on file    Unstable Housing in the Last Year: Not on file       No family history on file. Allergies:  Simvastatin    Home Medications:  Prior to Admission medications    Medication Sig Start Date End Date Taking?  Authorizing Provider   furosemide (LASIX) 40 MG tablet Take 1 tablet by mouth 2 times daily 5/13/22   Maximino Fairchild MD   Multiple Vitamins-Minerals (PRESERVISION AREDS 2 PO) Take 1 tablet by mouth 2 times daily     Historical Provider, MD   acetaminophen (TYLENOL) 325 MG tablet Take 650 mg by mouth every 6 hours as needed    Historical Provider, MD   albuterol (PROVENTIL) (2.5 MG/3ML) 0.083% nebulizer solution Inhale 2.5 mg into the lungs every 6 hours as needed     Historical Provider, MD   amiodarone (CORDARONE) 200 MG tablet Take 200 mg by mouth daily 2/24/22   Historical Provider, MD   apixaban (ELIQUIS) 5 MG TABS tablet Take 5 mg by mouth 2 times daily 3/14/22   Historical Provider, MD   aspirin 81 MG EC tablet Take 81 mg by mouth daily    Historical Provider, MD   atorvastatin (LIPITOR) 40 MG tablet Take 40 mg by mouth nightly  9/30/21   Historical Provider, MD   bisacodyl (DULCOLAX) 10 MG suppository Place 10 mg rectally daily     Historical Provider, MD   ergocalciferol (ERGOCALCIFEROL) 1.25 MG (86403 UT) capsule TAKE ONE CAPSULE BY MOUTH ONCE EVERY WEEK FOR VITAMIN (D) DEFICIENCY 10/12/21   Historical Provider, MD   famotidine (PEPCID) 20 MG tablet Take 20 mg by mouth 2 times daily  2/24/22   Historical Provider, MD   hydrALAZINE (APRESOLINE) 10 MG tablet Take 10 mg by mouth 3 times daily 4/19/22   Historical Provider, MD   insulin lispro, 1 Unit Dial, 100 UNIT/ML SOPN Per s/s 2/24/22   Historical Provider, MD   isosorbide dinitrate (ISORDIL) 10 MG tablet Take 10 mg by mouth 3 times daily 4/19/22   Historical Provider, MD   magnesium oxide (MAG-OX) 400 (240 Mg) MG tablet Take 200 mg by mouth daily 3/31/22   Historical Provider, MD   metoprolol succinate (TOPROL XL) 25 MG extended release tablet Take 25 mg by mouth daily 9/30/21   Historical Provider, MD   QUEtiapine (SEROQUEL) 25 MG tablet Take 25 mg by mouth 2/24/22   Historical Provider, MD   atorvastatin (LIPITOR) 80 MG tablet Take 80 mg by mouth daily    Historical Provider, MD   GLIPIZIDE PO Take 5 mg by mouth daily     Historical Provider, MD   metFORMIN (GLUCOPHAGE) 850 MG tablet Take 850 mg by mouth 2 times daily (with meals)     Historical Provider, MD       REVIEW OF SYSTEMS    (2-9 systems for level 4, 10 or more for level 5)      Review of Systems   Constitutional: Positive for fatigue. Negative for activity change, chills and fever. HENT: Negative for congestion and sore throat. Respiratory: Negative for cough and shortness of breath. Cardiovascular: Negative for chest pain and leg swelling. Gastrointestinal: Positive for blood in stool. Negative for abdominal pain, diarrhea, nausea and vomiting. Genitourinary: Negative for decreased urine volume and dysuria. Skin: Negative for pallor and rash. Allergic/Immunologic: Negative for environmental allergies and food allergies. Neurological: Positive for light-headedness. Negative for dizziness and headaches. Psychiatric/Behavioral: Negative for confusion and decreased concentration.        PHYSICAL EXAM   (up to 7 for level 4, 8 or more for level 5)      INITIAL VITALS:   BP (!) 121/42   Pulse 53   Temp 97.8 °F (36.6 °C) (Oral)   Resp 17   Ht 5' 10\" (1.778 m)   Wt 180 lb (81.6 kg)   SpO2 100%   BMI 25.83 kg/m²     Physical Exam  Constitutional:       General: He is not in acute distress. Appearance: Normal appearance. He is normal weight. He is not ill-appearing or toxic-appearing. Comments: Tired appearing but in no acute distress   HENT:      Head: Normocephalic and atraumatic. Mouth/Throat:      Mouth: Mucous membranes are moist.      Pharynx: Oropharynx is clear. Eyes:      Extraocular Movements: Extraocular movements intact. Pupils: Pupils are equal, round, and reactive to light. Comments: Pale conjunctiva   Cardiovascular:      Rate and Rhythm: Normal rate and regular rhythm. Pulses: Normal pulses. Heart sounds: Normal heart sounds. Pulmonary:      Effort: Pulmonary effort is normal.      Breath sounds: Normal breath sounds. Abdominal:      General: Bowel sounds are normal.      Palpations: Abdomen is soft. Tenderness: There is no abdominal tenderness. There is no rebound. Musculoskeletal:         General: Normal range of motion. Skin:     General: Skin is warm and dry. Neurological:      General: No focal deficit present. Mental Status: He is alert and oriented to person, place, and time. Psychiatric:         Mood and Affect: Mood normal.         Behavior: Behavior normal.         Thought Content: Thought content normal.         Judgment: Judgment normal.         INITIAL IMPRESSION / DIFFERENTIAL  DIAGNOSIS / PLAN     INITIAL IMPRESSION / DDX:   66-year-old male with significant hemoglobin drop in the past week, has received second unit of packed red blood cells. Will repeat H&H in 1 hour to see need for additional transfusion but caution with low EF. Expect admission to medical ICU. He is hemodynamically stable now but has had maps down to the 50s prior to arrival.  Also elevated INR will complete hepatic function panel, updated type and screen for wristband for dislocation. Discussed with gastroenterology.   Plan for Protonix bolus and infusion, octreotide, Rocephin. At time of arrival unclear if this is upper versus lower GI bleed. EMERGENCY DEPARTMENT COURSE:  ED Course as of 06/30/22 0902 Wed Jun 29, 2022   1837 GI agree  [SM]   1909 Updated family [SM]      ED Course User Index  [SM] Judy Cruz MD       PLAN (LABS / [place] [facility] / EKG):  Orders Placed This Encounter   Procedures    Hepatic Function Panel    Hemoglobin and Hematocrit    Troponin    Inpatient consult to GI    TYPE AND SCREEN       MEDICATIONS ORDERED:  Orders Placed This Encounter   Medications    pantoprazole (PROTONIX) 80 mg in sodium chloride (PF) 20 mL injection    octreotide (SANDOSTATIN) 500 mcg in sodium chloride 0.9 % 100 mL infusion       DIAGNOSTIC RESULTS / PROCEDURES / CONSULTS   LAB RESULTS:  No results found for this visit on 06/29/22. RADIOLOGY:  XR CHEST PORTABLE    Result Date: 6/29/2022  EXAMINATION: ONE XRAY VIEW OF THE CHEST 6/29/2022 1:44 pm COMPARISON: June 22, 2022 chest exam HISTORY: ORDERING SYSTEM PROVIDED HISTORY: shortness of breath TECHNOLOGIST PROVIDED HISTORY: shortness of breath FINDINGS: Median sternotomy/stable cardiomegaly Limited assessment of the left lung base, left retrocardiac density is suspected. Otherwise clear lungs     Limited left basilar assessment, left retrocardiac density is suspected which may be related to a pleural and/or parenchymal process.   Upright PA and lateral views of the chest are suggested for more accurate assessment of the left lung base        EKG Interpretation    Rhythm: normal sinus   Rate: 53  Axis: normal  Ectopy: none  Conduction: left bundle branch block (complete)  ST Segments: no acute change  T Waves: normal  Q Waves: none    Clinical Impression: left bundle branch block and wide QRS, unchanged from prior    All EKG's are interpreted by the Emergency Department Physician who either signs or Co-signs this chart in the absence of a cardiologist.    PROCEDURES:  Procedures     CONSULTS:  IP CONSULT TO GI    FINAL IMPRESSION      1. Gastrointestinal hemorrhage, unspecified gastrointestinal hemorrhage type          DISPOSITION / PLAN     DISPOSITION Decision To Admit 06/29/2022 06:12:57 PM    Admitted to MICU  - GI consulted, likely scope in am  - MICU to consult Cards    PATIENT REFERRED TO:  No follow-up provider specified.     DISCHARGE MEDICATIONS:  New Prescriptions    No medications on file       Jay Jay Jones MD  Emergency Medicine Resident    (Please note that portions of thisnote were completed with a voice recognition program.  Efforts were made to edit the dictations but occasionally words are mis-transcribed.)     Chris Jones MD  Resident  06/30/22 [id number]

## 2022-06-29 NOTE — ED NOTES
Pt arrived to the ER via [employer] mobile via stretcher. Pt was seen at [facility] for an initial complaint of dizziness, now concern for GI bleed w/ hemoglobin of 4.9, lower then his baseline in the 8's. Pt positive for fecal occult. Pt was given 2 units of PRBCs pta and pressure improved from 19'Q Systolic to now 443/25 on arrival. Pt mentating well, NAD otherwise. Pt able to stand bedside to urinate with no complaints of dizziness. Call light within reach, pt placed on full monitor, vitals taken, labs sent. No other complaints at this time.       Susan Palm, RN  06/29/22 Zachary Henson, RN  06/29/22 [id number]

## 2022-06-29 NOTE — CARE COORDINATION
06/29/22 1905   Service Assessment   Patient Orientation Alert and Oriented   Cognition Alert   History Provided By Spouse   Primary Caregiver Spouse   Accompanied By/Relationship spouse   Support Systems Spouse/Significant Other;Home Care Staff   PCP Verified by CM Yes   Last Visit to PCP Within last 3 months   Can patient return to prior living arrangement Yes   Ability to make needs known: Fair   Family able to assist with home care needs: Yes   Community Resources ECF/Home Care   Social/Functional History   Lives With Spouse   Type of [address] Two level; Able to Live on Main level with bedroom/bathroom   Bathroom Equipment Toilet raiser; [address], rolling; [address] Help From [employer]   ADL Assistance Needs assistance   Homemaking Assistance Needs assistance   Ambulation Assistance Needs assistance   Transfer Assistance Needs assistance   Active  No   Discharge Planning   Living Arrangements Spouse/Significant Other   Current Services Prior To Admission Durable Medical Equipment;Home Care   Potential Assistance Needed Durable Medical Equipment;Home Care;Skilled Nursing Facility   DME Walker; Wheelchair; [address] Medications No   Patient expects to be discharged to: [facility] [address] [facility] Discharge   Transition of Care Consult (CM Consult) Home Health   Condition of Participation: Discharge Planning   The Plan for Transition of Care is related to the following treatment goals: increased comfort level   From home with wife, current with [facility]

## 2022-06-29 NOTE — ED PROVIDER NOTES
[facility]  eMERGENCY dEPARTMENT eNCOUnter      Pt Name: Tristan Sherwood  MRN: [id number]  [place] 1945  Date of evaluation: 6/29/2022      CHIEF COMPLAINT       Chief Complaint   Patient presents with    Hypotension     Pt states wife noted his BP to be low. Pt denies any complaint.  Bradycardia         HISTORY OF PRESENT ILLNESS    Tristan Sherwood is a 68 y.o. male who presents for low blood pressure, patient had a recent admission for acute kidney injury to [facility] he has a history of heart issues he has a LifeVest in place they have been able to plant a internal defibrillator due to an infection in his right foot visiting nurse noticed his pressure was in the 80s today with a pulse in the 50s and she called squad on squad's arrival her pressure is 100 with a pulse in the 50s patient has no complaints      REVIEW OF SYSTEMS         Review of Systems   Constitutional: Positive for fatigue. Negative for chills and fever. HENT: Negative for congestion, dental problem, sore throat and trouble swallowing. Eyes: Negative for visual disturbance. Respiratory: Negative for shortness of breath and wheezing. Cardiovascular: Negative for chest pain, palpitations and leg swelling. Gastrointestinal: Negative for abdominal pain, blood in stool, constipation, diarrhea, nausea and vomiting. Genitourinary: Negative for difficulty urinating, dysuria and testicular pain. Musculoskeletal: Negative for back pain, joint swelling and neck pain. Patient has a wound on his right foot and is wearing a wound boot he says this is actually improving   Skin: Negative for rash. Neurological: Negative for dizziness, syncope, weakness and headaches. Hematological: Negative for adenopathy. Bruises/bleeds easily. Patient is on Eliquis for A. fib   Psychiatric/Behavioral: Negative for confusion and suicidal ideas.          PAST MEDICAL HISTORY    has a past medical history of Cerebrovascular accident (CVA) due to embolism of cerebral artery ([facility] CKD (chronic kidney disease) stage 4, GFR 15-29 ml/min ([facility] Coronary artery disease involving coronary bypass graft of native heart without angina pectoris, H/O lower gastrointestinal bleeding, Tobacco abuse, and Type II or unspecified type diabetes mellitus without mention of complication, not stated as uncontrolled. SURGICAL HISTORY      has a past surgical history that includes Cardiac surgery.     CURRENT MEDICATIONS       Previous Medications    ACETAMINOPHEN (TYLENOL) 325 MG TABLET    Take 650 mg by mouth every 6 hours as needed    ALBUTEROL (PROVENTIL) (2.5 MG/3ML) 0.083% NEBULIZER SOLUTION    Inhale 2.5 mg into the lungs every 6 hours as needed     AMIODARONE (CORDARONE) 200 MG TABLET    Take 200 mg by mouth daily    APIXABAN (ELIQUIS) 5 MG TABS TABLET    Take 5 mg by mouth 2 times daily    ASPIRIN 81 MG EC TABLET    Take 81 mg by mouth daily    ATORVASTATIN (LIPITOR) 40 MG TABLET    Take 40 mg by mouth nightly     ATORVASTATIN (LIPITOR) 80 MG TABLET    Take 80 mg by mouth daily    BISACODYL (DULCOLAX) 10 MG SUPPOSITORY    Place 10 mg rectally daily     ERGOCALCIFEROL (ERGOCALCIFEROL) 1.25 MG (27558 UT) CAPSULE    TAKE ONE CAPSULE BY MOUTH ONCE EVERY WEEK FOR VITAMIN (D) DEFICIENCY    FAMOTIDINE (PEPCID) 20 MG TABLET    Take 20 mg by mouth 2 times daily     FUROSEMIDE (LASIX) 40 MG TABLET    Take 1 tablet by mouth 2 times daily    GLIPIZIDE PO    Take 5 mg by mouth daily     HYDRALAZINE (APRESOLINE) 10 MG TABLET    Take 10 mg by mouth 3 times daily    INSULIN LISPRO, 1 UNIT DIAL, 100 UNIT/ML SOPN    Per s/s    ISOSORBIDE DINITRATE (ISORDIL) 10 MG TABLET    Take 10 mg by mouth 3 times daily    MAGNESIUM OXIDE (MAG-OX) 400 (240 MG) MG TABLET    Take 200 mg by mouth daily    METFORMIN (GLUCOPHAGE) 850 MG TABLET    Take 850 mg by mouth 2 times daily (with meals)     METOPROLOL SUCCINATE (TOPROL XL) 25 MG EXTENDED RELEASE TABLET    Take 25 mg by mouth daily    MULTIPLE VITAMINS-MINERALS (PRESERVISION AREDS 2 PO)    Take 1 tablet by mouth 2 times daily     QUETIAPINE (SEROQUEL) 25 MG TABLET    Take 25 mg by mouth       ALLERGIES     is allergic to simvastatin. FAMILY HISTORY     He indicated that his mother is . He indicated that his father is . He indicated that both of his sisters are alive. He indicated that his brother is alive. family history is not on file. SOCIAL HISTORY      reports that he quit smoking about 3 months ago. His smoking use included cigarettes. He smoked 1.50 packs per day. He has never used smokeless tobacco. He reports that he does not drink alcohol and does not use drugs. PHYSICAL EXAM     INITIAL VITALS:  height is 5' 10\" (1.778 m) and weight is 183 lb (83 kg). His temporal temperature is 96.9 °F (36.1 °C). His blood pressure is 71/42 (abnormal) and his pulse is 54. His respiration is 16 and oxygen saturation is 100%. Physical Exam  Constitutional:       Appearance: He is well-developed. Comments: Patient is in no acute distress. Appears very pale   HENT:      Head: Normocephalic and atraumatic. Right Ear: External ear normal.      Left Ear: External ear normal.   Eyes:      Pupils: Pupils are equal, round, and reactive to light. Cardiovascular:      Rate and Rhythm: Regular rhythm. Bradycardia present. Pulmonary:      Effort: Pulmonary effort is normal.      Breath sounds: Normal breath sounds. Abdominal:      General: Bowel sounds are normal.      Palpations: Abdomen is soft. Genitourinary:     Rectum: Guaiac result positive. Comments: Stool black in color  Musculoskeletal:         General: Normal range of motion. Cervical back: Normal range of motion and neck supple. Comments: Wearing surgical boot of the right foot   Skin:     General: Skin is warm and dry. Neurological:      General: No focal deficit present.       Mental Status: He is alert and oriented to person, place, and time.    Psychiatric:         Mood and Affect: Mood normal.         Behavior: Behavior normal.           DIFFERENTIAL DIAGNOSIS/ MDM:     Hypotension we will do a work-up    DIAGNOSTIC RESULTS     EKG: All EKG's are interpreted by the Emergency Department Physician who either signs or Co-signs this chart in the absence of a cardiologist.    EKG shows sinus bradycardia rate of 48 bpm PA was 192 ms QRS durations 142 ms QT corrected 435 ms axis is 104 he has a sinus bradycardia with a rightward axis he does have and nonspecific intraventricular block he has had a left bundle in the past he has some new T wave inversions in the lateral leads as well as inferior    RADIOLOGY:   I directly visualized the following  images and reviewed the radiologist interpretations:     EXAMINATION:   ONE XRAY VIEW OF THE CHEST       6/29/2022 1:44 pm       COMPARISON:   June 22, 2022 chest exam       HISTORY:   ORDERING SYSTEM PROVIDED HISTORY: shortness of breath   TECHNOLOGIST PROVIDED HISTORY:   shortness of breath       FINDINGS:   Median sternotomy/stable cardiomegaly       Limited assessment of the left lung base, left retrocardiac density is   suspected.  Otherwise clear lungs           Impression   Limited left basilar assessment, left retrocardiac density is suspected which   may be related to a pleural and/or parenchymal process.  Upright PA and   lateral views of the chest are suggested for more accurate assessment of the   left lung base                     ED BEDSIDE ULTRASOUND:       LABS:  Labs Reviewed   CBC WITH AUTO DIFFERENTIAL - Abnormal; Notable for the following components:       Result Value    RBC 1.88 (*)     Hemoglobin 4.9 (*)     Hematocrit 15.7 (*)     RDW 18.2 (*)     Seg Neutrophils 73 (*)     Lymphocytes 16 (*)     Immature Granulocytes 1 (*)     All other components within normal limits   COMPREHENSIVE METABOLIC PANEL W/ REFLEX TO MG FOR LOW K - Abnormal; Notable for the following components:    Glucose 187 (*)     BUN 81 (*)     CREATININE 2.91 (*)     Bun/Cre Ratio 28 (*)     Sodium 130 (*)     Chloride 95 (*)     Total Bilirubin 0.19 (*)     Total Protein 5.6 (*)     Albumin 3.3 (*)     GFR Non- 21 (*)     GFR  26 (*)     All other components within normal limits   LACTATE, SEPSIS - Abnormal; Notable for the following components:    Lactic Acid, Sepsis 2.4 (*)     All other components within normal limits   TROPONIN - Abnormal; Notable for the following components:    Troponin, High Sensitivity 97 (*)     All other components within normal limits   BRAIN NATRIURETIC PEPTIDE - Abnormal; Notable for the following components:    Pro-BNP 4,184 (*)     All other components within normal limits   PROTIME-INR - Abnormal; Notable for the following components:    Protime 21.0 (*)     All other components within normal limits   APTT - Abnormal; Notable for the following components:    PTT 36.9 (*)     All other components within normal limits   POCT OCCULT BLOOD STOOL COLON CA SCREEN 1-3 - Normal   POCT OCCULT BLOOD STOOL COLON CA SCREEN 1-3 - Normal   CULTURE, BLOOD 1   CULTURE, BLOOD 1   LACTATE, SEPSIS   URINALYSIS WITH REFLEX TO CULTURE   TYPE AND SCREEN   PREPARE RBC (CROSSMATCH)           EMERGENCY DEPARTMENT COURSE:   Vitals:    Vitals:    06/29/22 1613 06/29/22 1615 06/29/22 1618 06/29/22 1623   BP: (!) 91/42 (!) 91/42 (!) 109/49 (!) 71/42   Pulse: 61 57 55 54   Resp: 21 17 23 16   Temp: (!) 96.1 °F (35.6 °C)  97 °F (36.1 °C) 96.9 °F (36.1 °C)   TempSrc:   Temporal Temporal   SpO2: 98% 99%  100%   Weight:       Height:         -------------------------  BP: (!) 71/42, Temp: 96.9 °F (36.1 °C), Heart Rate: 54, Resp: 16        Re-evaluation Notes    Patient's blood pressure actually has improved, discussed with the wife and the patient the need for transfusion as he is dropped a significant amount of hemoglobin    CRITICAL CARE:   Due to the immediate potential for life-threatening deterioration due to traumatic anemia GI bleed, I spent 60 minutes providing critical care. This time is excluding time spent performing procedures. Initially patient requested to go back to [facility] where he had been seen last week contacting [facility] they said they are closed to transfers we then contacted [facility] for transfer as he has been there before and his cardiologist go there again we were told that they were on a prolonged wait for any transfers at 24 hours given the fact that he needs to be seen by nephrology and GI and his critical nature he was agreeable to go to [facility]. [facility] we contacted [facility] who said there was significant wait for any transfer to [facility] at this point I discussed the case with the ER attending Dr. Pollo Brewer who was gracious enough to accept him in transfer  Patient be transferred by mobile ICU as type-specific blood is available now and transfusion will began      CONSULTS:      PROCEDURES:  None    FINAL IMPRESSION      1. Symptomatic anemia          DISPOSITION/PLAN   DISPOSITION transferred    Condition on Disposition    Stable    PATIENT REFERRED TO:  No follow-up provider specified. DISCHARGE MEDICATIONS:  New Prescriptions    No medications on file       (Please note that portions of this note were completed with a voice recognition program.  Efforts were made to edit the dictations but occasionally words are mis-transcribed.)    Rene Tran MD,, MD, F.A.A.E.M.   Attending Emergency Physician                          Rene Tran MD  06/29/22 [id number]

## 2022-06-29 NOTE — TELEPHONE ENCOUNTER
Alma from [address] [address] called to report patient's BP of 88/30. Patient was advised to go to ER by Dr. Ayesha Rajan. Please call Alma back at [phone].

## 2022-06-30 ENCOUNTER — APPOINTMENT (OUTPATIENT)
Dept: CT IMAGING | Age: 77
DRG: 378 | End: 2022-06-30
Payer: OTHER GOVERNMENT

## 2022-06-30 LAB
ABSOLUTE EOS #: 0.21 K/UL (ref 0–0.44)
ABSOLUTE IMMATURE GRANULOCYTE: 0.03 K/UL (ref 0–0.3)
ABSOLUTE LYMPH #: 1.09 K/UL (ref 1.1–3.7)
ABSOLUTE MONO #: 0.82 K/UL (ref 0.1–1.2)
ALBUMIN SERPL-MCNC: 3.1 G/DL (ref 3.5–5.2)
ALBUMIN/GLOBULIN RATIO: 1.6 (ref 1–2.5)
ALP BLD-CCNC: 68 U/L (ref 40–129)
ALT SERPL-CCNC: 11 U/L (ref 5–41)
ANION GAP SERPL CALCULATED.3IONS-SCNC: 14 MMOL/L (ref 9–17)
AST SERPL-CCNC: 18 U/L
BASOPHILS # BLD: 1 % (ref 0–2)
BASOPHILS ABSOLUTE: 0.04 K/UL (ref 0–0.2)
BILIRUB SERPL-MCNC: 0.4 MG/DL (ref 0.3–1.2)
BILIRUBIN DIRECT: 0.13 MG/DL
BILIRUBIN, INDIRECT: 0.27 MG/DL (ref 0–1)
BUN BLDV-MCNC: 70 MG/DL (ref 8–23)
CALCIUM SERPL-MCNC: 8.5 MG/DL (ref 8.6–10.4)
CHLORIDE BLD-SCNC: 99 MMOL/L (ref 98–107)
CO2: 21 MMOL/L (ref 20–31)
CREAT SERPL-MCNC: 2.43 MG/DL (ref 0.7–1.2)
EOSINOPHILS RELATIVE PERCENT: 3 % (ref 1–4)
GFR AFRICAN AMERICAN: 32 ML/MIN
GFR NON-AFRICAN AMERICAN: 26 ML/MIN
GFR SERPL CREATININE-BSD FRML MDRD: ABNORMAL ML/MIN/{1.73_M2}
GLUCOSE BLD-MCNC: 169 MG/DL (ref 70–99)
HCT VFR BLD CALC: 22 % (ref 40.7–50.3)
HCT VFR BLD CALC: 25.6 % (ref 40.7–50.3)
HCT VFR BLD CALC: 25.9 % (ref 40.7–50.3)
HCT VFR BLD CALC: 27.4 % (ref 40.7–50.3)
HEMOGLOBIN: 6.8 G/DL (ref 13–17)
HEMOGLOBIN: 8.1 G/DL (ref 13–17)
HEMOGLOBIN: 8.4 G/DL (ref 13–17)
HEMOGLOBIN: 8.5 G/DL (ref 13–17)
IMMATURE GRANULOCYTES: 0 %
INR BLD: 1.2
LYMPHOCYTES # BLD: 15 % (ref 24–43)
MAGNESIUM: 2 MG/DL (ref 1.6–2.6)
MCH RBC QN AUTO: 26.3 PG (ref 25.2–33.5)
MCHC RBC AUTO-ENTMCNC: 30.9 G/DL (ref 28.4–34.8)
MCV RBC AUTO: 84.9 FL (ref 82.6–102.9)
MONOCYTES # BLD: 11 % (ref 3–12)
MRSA, DNA, NASAL: NEGATIVE
NRBC AUTOMATED: 0 PER 100 WBC
PDW BLD-RTO: 17.6 % (ref 11.8–14.4)
PHOSPHORUS: 4.1 MG/DL (ref 2.5–4.5)
PLATELET # BLD: 230 K/UL (ref 138–453)
PMV BLD AUTO: 10.7 FL (ref 8.1–13.5)
POTASSIUM SERPL-SCNC: 3.8 MMOL/L (ref 3.7–5.3)
PROTHROMBIN TIME: 12.4 SEC (ref 9.1–12.3)
RBC # BLD: 2.59 M/UL (ref 4.21–5.77)
RBC # BLD: ABNORMAL 10*6/UL
SEG NEUTROPHILS: 71 % (ref 36–65)
SEGMENTED NEUTROPHILS ABSOLUTE COUNT: 5.25 K/UL (ref 1.5–8.1)
SODIUM BLD-SCNC: 134 MMOL/L (ref 135–144)
SPECIMEN DESCRIPTION: NORMAL
TOTAL PROTEIN: 5.1 G/DL (ref 6.4–8.3)
WBC # BLD: 7.4 K/UL (ref 3.5–11.3)

## 2022-06-30 PROCEDURE — 86900 BLOOD TYPING SEROLOGIC ABO: CPT

## 2022-06-30 PROCEDURE — 2580000003 HC RX 258: Performed by: HEALTH CARE PROVIDER

## 2022-06-30 PROCEDURE — 80076 HEPATIC FUNCTION PANEL: CPT

## 2022-06-30 PROCEDURE — 74176 CT ABD & PELVIS W/O CONTRAST: CPT

## 2022-06-30 PROCEDURE — 0DJ08ZZ INSPECTION OF UPPER INTESTINAL TRACT, VIA NATURAL OR ARTIFICIAL OPENING ENDOSCOPIC: ICD-10-PCS | Performed by: INTERNAL MEDICINE

## 2022-06-30 PROCEDURE — 6360000002 HC RX W HCPCS: Performed by: HEALTH CARE PROVIDER

## 2022-06-30 PROCEDURE — 6370000000 HC RX 637 (ALT 250 FOR IP): Performed by: STUDENT IN AN ORGANIZED HEALTH CARE EDUCATION/TRAINING PROGRAM

## 2022-06-30 PROCEDURE — 99291 CRITICAL CARE FIRST HOUR: CPT | Performed by: INTERNAL MEDICINE

## 2022-06-30 PROCEDURE — 36430 TRANSFUSION BLD/BLD COMPNT: CPT

## 2022-06-30 PROCEDURE — P9040 RBC LEUKOREDUCED IRRADIATED: HCPCS

## 2022-06-30 PROCEDURE — 80048 BASIC METABOLIC PNL TOTAL CA: CPT

## 2022-06-30 PROCEDURE — 6360000002 HC RX W HCPCS

## 2022-06-30 PROCEDURE — 2000000000 HC ICU R&B

## 2022-06-30 PROCEDURE — 43235 EGD DIAGNOSTIC BRUSH WASH: CPT | Performed by: INTERNAL MEDICINE

## 2022-06-30 PROCEDURE — C9113 INJ PANTOPRAZOLE SODIUM, VIA: HCPCS | Performed by: HEALTH CARE PROVIDER

## 2022-06-30 PROCEDURE — 2580000003 HC RX 258: Performed by: STUDENT IN AN ORGANIZED HEALTH CARE EDUCATION/TRAINING PROGRAM

## 2022-06-30 PROCEDURE — 99213 OFFICE O/P EST LOW 20 MIN: CPT

## 2022-06-30 PROCEDURE — 3609017100 HC EGD: Performed by: INTERNAL MEDICINE

## 2022-06-30 PROCEDURE — 85025 COMPLETE CBC W/AUTO DIFF WBC: CPT

## 2022-06-30 PROCEDURE — 85610 PROTHROMBIN TIME: CPT

## 2022-06-30 PROCEDURE — 84100 ASSAY OF PHOSPHORUS: CPT

## 2022-06-30 PROCEDURE — 83735 ASSAY OF MAGNESIUM: CPT

## 2022-06-30 PROCEDURE — 36415 COLL VENOUS BLD VENIPUNCTURE: CPT

## 2022-06-30 PROCEDURE — 99223 1ST HOSP IP/OBS HIGH 75: CPT | Performed by: INTERNAL MEDICINE

## 2022-06-30 PROCEDURE — 85018 HEMOGLOBIN: CPT

## 2022-06-30 PROCEDURE — 85014 HEMATOCRIT: CPT

## 2022-06-30 RX ORDER — AMIODARONE HYDROCHLORIDE 200 MG/1
100 TABLET ORAL DAILY
Status: DISCONTINUED | OUTPATIENT
Start: 2022-07-01 | End: 2022-07-02 | Stop reason: HOSPADM

## 2022-06-30 RX ORDER — MIDAZOLAM HYDROCHLORIDE 2 MG/2ML
4 INJECTION, SOLUTION INTRAMUSCULAR; INTRAVENOUS ONCE
Status: DISCONTINUED | OUTPATIENT
Start: 2022-06-30 | End: 2022-06-30

## 2022-06-30 RX ORDER — MIDODRINE HYDROCHLORIDE 5 MG/1
10 TABLET ORAL
Status: DISCONTINUED | OUTPATIENT
Start: 2022-06-30 | End: 2022-07-02 | Stop reason: HOSPADM

## 2022-06-30 RX ORDER — MIDAZOLAM HYDROCHLORIDE 2 MG/2ML
2 INJECTION, SOLUTION INTRAMUSCULAR; INTRAVENOUS ONCE
Status: COMPLETED | OUTPATIENT
Start: 2022-06-30 | End: 2022-06-30

## 2022-06-30 RX ORDER — FENTANYL CITRATE 50 UG/ML
50 INJECTION, SOLUTION INTRAMUSCULAR; INTRAVENOUS ONCE
Status: DISCONTINUED | OUTPATIENT
Start: 2022-06-30 | End: 2022-06-30

## 2022-06-30 RX ORDER — FENTANYL CITRATE 50 UG/ML
INJECTION, SOLUTION INTRAMUSCULAR; INTRAVENOUS
Status: COMPLETED
Start: 2022-06-30 | End: 2022-06-30

## 2022-06-30 RX ORDER — SODIUM CHLORIDE 9 MG/ML
INJECTION, SOLUTION INTRAVENOUS PRN
Status: DISCONTINUED | OUTPATIENT
Start: 2022-06-30 | End: 2022-06-30

## 2022-06-30 RX ORDER — FENTANYL CITRATE 50 UG/ML
25 INJECTION, SOLUTION INTRAMUSCULAR; INTRAVENOUS ONCE
Status: COMPLETED | OUTPATIENT
Start: 2022-06-30 | End: 2022-06-30

## 2022-06-30 RX ORDER — MIDAZOLAM HYDROCHLORIDE 1 MG/ML
INJECTION INTRAMUSCULAR; INTRAVENOUS
Status: COMPLETED
Start: 2022-06-30 | End: 2022-06-30

## 2022-06-30 RX ADMIN — FENTANYL CITRATE 25 MCG: 50 INJECTION, SOLUTION INTRAMUSCULAR; INTRAVENOUS at 11:04

## 2022-06-30 RX ADMIN — MIDAZOLAM HYDROCHLORIDE 2 MG: 2 INJECTION, SOLUTION INTRAMUSCULAR; INTRAVENOUS at 11:04

## 2022-06-30 RX ADMIN — SODIUM CHLORIDE 8 MG/HR: 9 INJECTION, SOLUTION INTRAVENOUS at 14:13

## 2022-06-30 RX ADMIN — QUETIAPINE FUMARATE 25 MG: 25 TABLET ORAL at 20:16

## 2022-06-30 RX ADMIN — DEXTROSE AND SODIUM CHLORIDE: 5; 450 INJECTION, SOLUTION INTRAVENOUS at 11:59

## 2022-06-30 RX ADMIN — SODIUM CHLORIDE, PRESERVATIVE FREE 10 ML: 5 INJECTION INTRAVENOUS at 20:17

## 2022-06-30 RX ADMIN — SODIUM CHLORIDE 500 ML: 9 INJECTION, SOLUTION INTRAVENOUS at 00:22

## 2022-06-30 RX ADMIN — SODIUM CHLORIDE, PRESERVATIVE FREE 10 ML: 5 INJECTION INTRAVENOUS at 09:30

## 2022-06-30 RX ADMIN — MIDAZOLAM HYDROCHLORIDE 2 MG: 1 INJECTION, SOLUTION INTRAMUSCULAR; INTRAVENOUS at 11:04

## 2022-06-30 RX ADMIN — SODIUM CHLORIDE 8 MG/HR: 9 INJECTION, SOLUTION INTRAVENOUS at 04:17

## 2022-06-30 RX ADMIN — DESMOPRESSIN ACETATE 40 MG: 0.2 TABLET ORAL at 20:16

## 2022-06-30 ASSESSMENT — ENCOUNTER SYMPTOMS
SORE THROAT: 0
BLOOD IN STOOL: 1
VOMITING: 0
SHORTNESS OF BREATH: 0
COUGH: 0
ABDOMINAL PAIN: 0
NAUSEA: 0
DIARRHEA: 0

## 2022-06-30 NOTE — PROGRESS NOTES
Anuric    REVIEW OF SYSTEMS:  · Constitutional: Negative for Fever, chills  · Eyes: Negative for visual changes, diplopia  · ENT: Negative for mouth sores, sore throat. · Cardiovascular: Negative for lightheadedness ,chest pain, palpitations   · Respiratory:Negative for Shortness of breath,cough or wheezing. · Gastrointestinal: Negative for nausea/vomiting, change in bowel habits, abdominal pain  · Genitourinary:Negative for change in bladder habits, dysuria, hematuria.   · Musculoskeletal: Negative for joint pain   · Neurological: Negative for headache, change in muscle strength numbness/tingling      OBJECTIVE:     VITAL SIGNS:  BP (!) 119/53   Pulse 55   Temp 97.8 °F (36.6 °C) (Axillary)   Resp 20   Ht 5' 10\" (1.778 m)   Wt 184 lb 11.9 oz (83.8 kg)   SpO2 94%   BMI 26.51 kg/m²   Tmax over 24 hours:  Temp (24hrs), Av.5 °F (36.4 °C), Min:96.1 °F (35.6 °C), Max:98.3 °F (36.8 °C)      Patient Vitals for the past 8 hrs:   BP Temp Temp src Pulse Resp SpO2   22 0700 (!) 119/53 -- -- 55 20 94 %   22 0635 (!) 112/49 97.8 °F (36.6 °C) Axillary 52 20 90 %   22 0630 (!) 123/46 -- -- 54 20 91 %   22 0600 128/64 97.8 °F (36.6 °C) Axillary 53 21 96 %   22 0545 (!) 135/59 -- -- 60 21 92 %   22 0530 (!) 128/58 -- -- 53 19 (!) 89 %   22 0515 (!) 112/52 97.9 °F (36.6 °C) Axillary 52 18 94 %   22 0500 109/79 97.9 °F (36.6 °C) Axillary 54 21 96 %   22 0448 (!) 118/54 98 °F (36.7 °C) Axillary 54 20 95 %   22 0432 (!) 122/48 98.3 °F (36.8 °C) Oral 57 23 92 %   22 0400 (!) 121/54 98.3 °F (36.8 °C) Oral 66 22 95 %   22 0300 (!) 103/39 -- -- 53 19 93 %   22 0200 (!) 107/38 -- -- 54 16 97 %   22 0100 (!) 109/40 -- -- 52 17 98 %         Intake/Output Summary (Last 24 hours) at 2022 0836  Last data filed at 2022 0700  Gross per 24 hour   Intake 1663.35 ml   Output 600 ml   Net 1063.35 ml     Date 22 0000 - 22 4699   Shift 9169-5243 8953-8002 0256-1463 24 Hour Total   INTAKE   I.V.(mL/kg) 772.7(9.2)   772.7(9.2)   Blood(mL/kg) 350(4.2)   350(4.2)   IV Piggyback(mL/kg) 540.7(6.5)   540.7(6.5)   Shift Total(mL/kg) 0777.7(70.9)   1663.4(19.8)   OUTPUT   Urine(mL/kg/hr) 600(0.9)   600   Shift Total(mL/kg) 600(7.2)   600(7.2)   Weight (kg) 83.8 83.8 83.8 83.8     Wt Readings from Last 3 Encounters:   06/29/22 184 lb 11.9 oz (83.8 kg)   06/29/22 183 lb (83 kg)   06/27/22 186 lb (84.4 kg)     Body mass index is 26.51 kg/m². PHYSICAL EXAM:  Constitutional: awake,alert, following commands  HEENT: PERRLA, EOMI, sclera clear, anicteric  Respiratory: clear to auscultation, no wheezes or rales and unlabored breathing. Cardiovascular: regular rate and rhythm, normal S1, S2, no murmur noted and 2+ pulses throughout  Abdomen: soft, nontender, nondistended, no masses or organomegaly  NEUROLOGIC: Awake, alert, oriented to name, place and time. Cranial nerves II-XII are grossly intact. Motor is 5 out of 5 bilaterally. Sensory is intact. Extremities:  peripheral pulses normal, no pedal edema,.       Any additional physical findings:      MEDICATIONS:  Scheduled Meds:   [Held by provider] amiodarone  200 mg Oral Daily    atorvastatin  40 mg Oral Nightly    [Held by provider] hydrALAZINE  10 mg Oral TID    QUEtiapine  25 mg Oral Nightly    sodium chloride flush  5-40 mL IntraVENous 2 times per day     Continuous Infusions:   pantoprazole 8 mg/hr (06/30/22 0700)    sodium chloride      dextrose 5 % and 0.45 % NaCl 75 mL/hr at 06/30/22 0700     PRN Meds:   sodium chloride flush, 5-40 mL, PRN  sodium chloride, , PRN  ondansetron, 4 mg, Q8H PRN   Or  ondansetron, 4 mg, Q6H PRN  polyethylene glycol, 17 g, Daily PRN  acetaminophen, 650 mg, Q6H PRN   Or  acetaminophen, 650 mg, Q6H PRN  albuterol, 2.5 mg, Q2H PRN        SUPPORT DEVICES: [] Ventilator [] BIPAP  [] Nasal Cannula [] Room Air    VENT SETTINGS (Comprehensive) (if applicable): Additional Respiratory Assessments  Heart Rate: 55  Resp: 20  SpO2: 94 %    ABGs:     No results found for: PHART, PH, [id number], PCO2, PO2ART, PO2, DFD9QFH, HCO3, BEART, BE, THGBART, THB, [id number], [id number], O2SAT, FIO2  Lactic Acid:   Lab Results   Component Value Date/Time    LACTA 1.8 06/12/2022 09:48 AM         DATA:  Complete Blood Count:   Recent Labs     06/29/22  1341 06/29/22  1341 06/29/22 2022 06/29/22  2303 06/30/22 0221   WBC 8.0  --   --   --  7.4   HGB 4.9*   < > 7.1* 7.1* 6.8*   MCV 83.5  --   --   --  84.9     --   --   --  230   RBC 1.88*  --   --   --  2.59*   HCT 15.7*   < > 22.4* 23.1* 22.0*   MCH 26.1  --   --   --  26.3   MCHC 31.2  --   --   --  30.9   RDW 18.2*  --   --   --  17.6*   MPV 10.2  --   --   --  10.7    < > = values in this interval not displayed. PT/INR:    Lab Results   Component Value Date/Time    PROTIME 21.0 06/29/2022 01:41 PM    INR 1.9 06/29/2022 01:41 PM     PTT:    Lab Results   Component Value Date/Time    APTT 36.9 06/29/2022 01:41 PM       Basal Metabolic Profile:   Recent Labs     06/28/22  1307 06/29/22  1341 06/30/22 0221   * 130* 134*   K 3.8 4.4 3.8   BUN 85* 81* 70*   CREATININE 2.84* 2.91* 2.43*   CL 96* 95* 99   CO2 19* 21 21      Magnesium:   Lab Results   Component Value Date/Time    MG 2.0 06/30/2022 02:21 AM    MG 2.0 04/22/2022 02:37 PM     Phosphorus:   Lab Results   Component Value Date/Time    PHOS 4.1 06/30/2022 02:21 AM     S. Calcium:  Recent Labs     06/30/22  0221   CALCIUM 8.5*     S. Ionized Calcium:No results for input(s): IONCA in the last 72 hours.       Urinalysis:   Lab Results   Component Value Date/Time    NITRU NEGATIVE 06/12/2022 11:34 AM    PHUR 6.5 06/12/2022 11:34 AM    WBCUA 0 TO 4 06/12/2022 11:34 AM    RBCUA None 06/12/2022 11:34 AM    BACTERIA None 06/12/2022 11:34 AM    SPECGRAV 1.005 06/12/2022 11:34 AM    LEUKOCYTESUR NEGATIVE 06/12/2022 11:34 AM    UROBILINOGEN Normal 06/12/2022 11:34 AM    BILIRUBINUR NEGATIVE 06/12/2022 11:34 AM    GLUCOSEU NEGATIVE 06/12/2022 11:34 AM    KETUA NEGATIVE 06/12/2022 11:34 AM       CARDIAC ENZYMES: No results for input(s): CKMB, CKMBINDEX, TROPONINI in the last 72 hours. Invalid input(s): CKTOTAL;3  BNP: No results for input(s): BNP in the last 72 hours. LFTS  Recent Labs     06/29/22  1341 06/29/22  1821 06/30/22  0221   ALKPHOS 74 67 68   ALT 12 12 11   AST 25 18 18   BILITOT 0.19* 0.27* 0.40   BILIDIR  --  0.09 0.13   LABALBU 3.3* 3.3* 3.1*       AMYLASE/LIPASE/AMMONIA  No results for input(s): AMYLASE, LIPASE, AMMONIA in the last 72 hours. Last 3 Blood Glucose:   Recent Labs     06/28/22  1307 06/29/22  1341 06/30/22  0221   GLUCOSE 179* 187* 169*      HgBA1c:  No results found for: LABA1C      TSH:  No results found for: TSH  ANEMIA STUDIES  No results for input(s): LABIRON, TIBC, FERRITIN, [id number], FOLATE, OCCULTBLD in the last 72 hours. Cultures during this admission:     Blood cultures:                 [x] None drawn      [] Negative             []  Positive (Details:  )  Urine Culture:                   [x] None drawn      [] Negative             []  Positive (Details:  )  Sputum Culture:               [x] None drawn       [] Negative             []  Positive (Details:  )   Endotracheal aspirate:     [x] None drawn       [] Negative             []  Positive (Details:  )        ASSESSMENT:     Principal Problem:    GI bleed  Active Problems:    CKD (chronic kidney disease) stage 4, GFR 15-29 ml/min ([facility])    PAD (peripheral artery disease) ([facility])    Skin ulcer of right great toe, limited to breakdown of skin ([facility])    Ischemic cardiomyopathy    Paroxysmal atrial fibrillation ([facility])    Type 2 diabetes mellitus with complication ([facility]  Resolved Problems:    * No resolved hospital problems. *        PLAN:         1. Neurologic:   · Neuro intact  · Neuro checks per protocol  · Sedation:none  · Pain management: none  2.  Cardiovascular:  · Hemodynamically stable  · HR 55  · MAPs 119/53  · MAP goal 65  3. Pulmonary:  · Maintain oxygen sats >92%  · Pulmonary toilet  · On room air  · CXR: reviewed  4. GI/Nutrition  · NPO  · Ulcer Prophylaxis: on protonix drip  · Diet:Diet NPO  · Last BM: yesterday, dark brown rectal exam today  5. Renal/Fluid/Electrolyte  · IV on dextrose half normal at 75 mL/Hr   · I/O: In: 1663.4 [I.V.:772.7; Blood:350]  · Out: 600 [Urine:600]  · UOP:  good  · BUN/Cr 70/2.43  · Monitor electrolytes, replace PRN   6. ID  WBC:   Lab Results   Component Value Date    WBC 7.4 2022   ·   · Tmax: Temp (24hrs), Av.5 °F (36.4 °C), Min:96.1 °F (35.6 °C), Max:98.3 °F (36.8 °C)  ·   · Antimicrobials:  not indicated   7. Hematology:  Recent Labs     22  2303 22  0221   HGB 7.1* 7.1* 6.8*   ·   8. Endocrine:   glucose controlled - most recent BGL is 169  Recent Labs     22  1307 22  1341 22  0221   GLUCOSE 179* 187* 169*   ·   9. DVT Prophylaxis  Hold due to concern for gi bleed        Aj Adhikari MD, PGY2        Department of Internal Medicine/ Critical care  [facility] ([address] [place] [address])             2022, 8:36 AM      Attending Physician Statement  I have discussed the care of [facility], including pertinent history and exam findings with the resident. I have reviewed the key elements of all parts of the encounter with the resident. I have seen and examined the patient with the resident. I agree with the assessment and plan and status of the problem list as documented. Please see H&P attested today. Please note that this chart was generated using voice recognition Dragon dictation software. Although every effort was made to ensure the accuracy of this automated transcription, some errors in transcription may have occurred.      Neeru Whitten MD  2022 4:49 PM

## 2022-06-30 NOTE — PROGRESS NOTES
[facility]  Documentation Note                Admission Dx: Rectal bleeding [K62.5]  Gastrointestinal hemorrhage, unspecified gastrointestinal hemorrhage type [K92.2]    Past Medical History:   has a past medical history of Cerebrovascular accident (CVA) due to embolism of cerebral artery ([facility] [address] CKD (chronic kidney disease) stage 4, GFR 15-29 ml/min ([facility] [address] Coronary artery disease involving coronary bypass graft of native heart without angina pectoris, H/O lower gastrointestinal bleeding, Tobacco abuse, and Type II or unspecified type diabetes mellitus without mention of complication, not stated as uncontrolled. Previous Testing:     ECHO 3/21/2022: EF 20-25%, mild AS, mild-moderate MR, trace TR. CABG 2/7/2022: LIMA-LAD, SVG-OM/D2     LOOP 1/10/2022: Adreal loop implanted for cryptogenic CVA     VICTORIANO 1/10/2022: EF 40-45%, inferolateral WMA, no CRUZ thrombus. EVENT MONITOR 8/7/2021: Event monitor with no atrial fibrillation. There were frequent PVCs   including 1 episode of nonsustained VT as above. Previous office/hospital visit:   SUSANA Flower, [facility] 5/4/2022:   1. Ischemic cardiomyopathy  2. ASCVD (arteriosclerotic cardiovascular disease)  3. Chronic HFrEF (heart failure with reduced ejection fraction) (CMS-HCC)  4. Paroxysmal atrial fibrillation (CMS-HCC)  5. Cerebrovascular accident (CVA), unspecified mechanism (CMS-HCC)    Plan --   1. Paroxysmal atrial fibrillation on Eliquis 5 mg p.o. b.i.d.. Rate control with Toprol 12.5 daily. The patient did have a known left atrial appendage ligation during his open-heart surgery 2/22, however he has not had a VICTORIANO that documents left atrial appendage closure. 2. Ischemic cardiomyopathy with an EF of 20-30% per most recent echocardiograms (1 done at [facility], 1 done at [facility] 3/21). Continue Toprol 12.5 b.i.d.. He cannot use Ace or Arb due to CKD and elevated potassium.  Will begin hydralazine 10 mg and isosorbide 10 mg p.o. t.i.d. for afterload reduction. The patient continues with a life vest.  3. ASCVD of native vessels status post CABG x3 02/07/2022 at [facility]. LIMA to LAD, SVG to OM/2nd diagonal. Continue aspirin, statin, beta-blocker. Plavix is been discontinued due to the beginning of Eliquis. 4. Episodes of nonsustained ventricular tachycardia during hospital stay, none documented since hospital discharge. He continues on amiodarone, recently reduced to 100 mg daily due to bradycardia. Continue life vest.  5. Chronic systolic congestive heart failure, patient is well compensated on exam  6. LAUREN/CKD with creatinine is 2.1 laying  7. Acute on chronic anemia status post polyp removal that was causing acute bleeding. Per GI, okay to resume Eliquis 04/18/2022  8. Hyperlipidemia: LDL 80 per lipid panel 8/21. Continue statin  9. CVA  10. Foot wound that prevents referral for ICD at this time. We will re-evaluate in the near future.      Samantha Potter, [facility]

## 2022-06-30 NOTE — PROGRESS NOTES
[facility] Nurse  Consult Note       NAME:  Long Reed RECORD NUMBER:  [id number]  AGE: 68 y.o. GENDER: male  : 1945  TODAY'S DATE:  2022    Subjective:     Reason for [facility] Evaluation and Assessment: \"foot wounds'      Lorelei Au is a 68 y.o. male referred by:   [x] Physician  [] Nursing  [] Other:     Wound Identification:  Wound Type: arterial and diabetic  Contributing Factors: diabetes, arterial insufficiency and decreased tissue oxygenation        PAD, DM. Feet are cool, painful and toes are dusky. He follows with Dr Kat Hagen for wound care and has a referral to vascular surgery, Dr Pam Kelley. He has been unable to follow up with vascular surgery due to recurrent illness and hospitalization.      Objective:      BP (!) 118/55   Pulse 53   Temp 98.1 °F (36.7 °C) (Oral)   Resp 20   Ht 5' 10\" (1.778 m)   Wt 184 lb 11.9 oz (83.8 kg)   SpO2 95%   BMI 26.51 kg/m²   Nikolai Risk Score: Nikolai Scale Score: 16    LABS    CBC:   Lab Results   Component Value Date/Time    WBC 7.4 2022 02:21 AM    RBC 2.59 2022 02:21 AM    HGB 8.1 2022 09:14 AM     CMP:  Albumin:    Lab Results   Component Value Date/Time    LABALBU 3.1 2022 02:21 AM     PT/INR:    Lab Results   Component Value Date/Time    PROTIME 12.4 2022 10:01 AM    INR 1.2 2022 10:01 AM     HgBA1c:  No results found for: LABA1C  PTT: No components found for: LABPTT      Assessment:       Measurements:     22 1150   Wound 22 Medial # 1 right great toe   Date First Assessed/Time First Assessed: 22 0916   Wound Location Orientation: Medial  Wound Description (Comments): # 1 right great toe   Wound Etiology Diabetic  (arterial)   Dressing Status New dressing applied   Wound Cleansed Betadine/povidone iodine   Dressing/Treatment Betadine swabs/povidone iodine;Dry dressing   Dressing Change Due 22   Wound Length (cm) 3.5 cm   Wound Width (cm) 2.4 cm   Wound Depth (cm) 0.1 cm   Wound Surface Area (cm^2) 8.4 cm^2   Change in Wound Size % (l*w) -47.37   Wound Volume (cm^3) 0.84 cm^3   Wound Healing % -47   Wound Assessment Eschar dry   Drainage Amount None   Odor None   Yuli-wound Assessment Intact; Cool   Wound 06/29/22 Toe (Comment  which one) Right outer side of foot, toes 2,3,4   Date First Assessed/Time First Assessed: 06/29/22 2123   Present on Hospital Admission: Yes  Location: Toe (Comment  which one)  Wound Location Orientation: Right  Wound Description (Comments): outer side of foot, toes 2,3,4   Wound Etiology Diabetic  (arterial)   Dressing Status New dressing applied   Wound Cleansed Betadine/povidone iodine   Dressing/Treatment Betadine swabs/povidone iodine;Open to air   Dressing Change Due 07/01/22   Wound Assessment Dusky;Eschar dry   Drainage Amount None   Odor None   Yuli-wound Assessment Intact   Wound 06/29/22 Toe (Comment  which one) Left toes 1, 4   Date First Assessed/Time First Assessed: 06/29/22 2123   Present on Hospital Admission: Yes  Location: Toe (Comment  which one)  Wound Location Orientation: Left  Wound Description (Comments): toes 1, 4   Wound Etiology Diabetic  (arterial)   Dressing Status New dressing applied   Wound Cleansed Betadine/povidone iodine   Dressing/Treatment Betadine swabs/povidone iodine;Open to air   Dressing Change Due 07/01/22   Wound Assessment Eschar dry;Dusky   Drainage Amount None   Odor None   Yuli-wound Assessment Intact   Wound 06/30/22 Heel Right   Date First Assessed/Time First Assessed: 06/30/22 1150   Present on Hospital Admission: Yes  Primary Wound Type: Diabetic Ulcer  Location: Heel  Wound Location Orientation: Right   Wound Image    Wound Etiology Diabetic  (arterial)   Dressing Status New dressing applied   Wound Cleansed Betadine/povidone iodine   Dressing/Treatment Betadine swabs/povidone iodine   Dressing Change Due 07/01/22   Wound Length (cm) 0.6 cm   Wound Width (cm) 0.3 cm   Wound Depth (cm) 0.2 cm Wound Surface Area (cm^2) 0.18 cm^2   Wound Volume (cm^3) 0.036 cm^3   Wound Assessment Eschar dry, superficial pink/ denuded. Drainage Amount None   Odor None   Yuli-wound Assessment Hyperpigmented; Intact, dry flaky   Wound 06/30/22 Foot Right;Lateral   Date First Assessed/Time First Assessed: 06/30/22 1150   Present on Hospital Admission: Yes  Primary Wound Type: Diabetic Ulcer  Location: Foot  Wound Location Orientation: Right;Lateral   Wound Image    Wound Etiology Diabetic   Dressing Status New dressing applied   Wound Cleansed Betadine/povidone iodine   Dressing/Treatment Betadine swabs/povidone iodine;Dry dressing   Dressing Change Due 07/01/22   Wound Length (cm) 0.5 cm   Wound Width (cm) 0.3 cm   Wound Depth (cm) 0.1 cm   Wound Surface Area (cm^2) 0.15 cm^2   Wound Volume (cm^3) 0.015 cm^3   Wound Assessment Superficial;Pink/red   Drainage Amount None   Odor None   Yuli-wound Assessment Intact            Response to treatment:  Well tolerated by patient. Plan:     Plan of Care:   Bilateral feet areas of eschar and dusky tissue: paint with betadine daily. Cover the rt medial #1 toe wound with a dry 2x2\" and secure with 2\" roll gauze daily. Routine pressure injury prevention methods. Specialty Bed Required : Yes   [] Low Air Loss   [x] Pressure Redistribution  [] Fluid Immersion  [] Bariatric  [] Total Pressure Relief  [] Other:     Discharge Plan:  Placement for patient upon discharge: home with support   Hospice Care: No   Patient appropriate for Outpatient [address]: Yes: follow up to Dr Alex Evans as scheduled. Patient/Caregiver Teaching:  The patient is sleepy due to recent procedural anesthetic.  Discussed with is wife at bedside who provides history  [] Indicates understanding       [] Needs reinforcement  [] Unsuccessful      [x] Verbal Understanding  [] Demonstrated understanding       [] No evidence of learning  [] Refused teaching         [] N/A       Electronically signed by Panfilo Reardon RN, CWON on 6/30/2022 at 2:17 PM

## 2022-06-30 NOTE — ED NOTES
SW met with pt and family at bedside to discuss ACP. Pt and family state they are wanting to further discuss options and will reach out when ready.       Ariana Cardenas [place]  06/29/22 [id number]

## 2022-06-30 NOTE — PROGRESS NOTES
Versed and fentanyl given at this time for bedside EGD. Dr. Princess Knight in room. Patient oxygenating well.  VS q2min

## 2022-06-30 NOTE — CONSULTS
[facility]    Inpatient Consultation Note               Today's Date: 6/30/2022  Patient Name: Ryan Cordero  Date of admission: 6/29/2022  5:58 PM  Patient's age: 68 y. o., 1945  Admission Dx: Rectal bleeding [K62.5]  Gastrointestinal hemorrhage, unspecified gastrointestinal hemorrhage type [K92.2]    Reason for  Consult:  Cardiac history    Requesting Physician: Penny Corbett MD    CHIEF COMPLAINT:     Chief Complaint   Patient presents with    Other     GI bleed ( [place] )       History Obtained From:  patient, electronic medical record    HISTORY OF PRESENT ILLNESS:    68 y.o.  male who initially presented to [facility] emergency department today due to hypotension at home and weakness. Home health nurse today noted the blood pressure was 80/30, was found to have a GI bleed. Hemoglobin at [place] was 4.9 and patient received 2 units of PRBCs. On arrival to the emergency department here, the patient had received 2 unit PRBCs and blood pressure improved to 120s/40s. In April 2022, the patient was admitted to [facility] due to black tarry stools and was found to have a GI bleed requiring blood transfusion. He had a colonoscopy done on 4/12/2022 status post cold forceps, cold snare, hot snare polypectomy due to melena and was found to have polyps and internal hemorrhoids. Cardiology consulted in setting of complex cardiac history with ischemic cardiomyopathy with EF of 20 to 25%, coronary artery disease status post CABG, atrial fibrillation, CKD stage III.       Past Medical History:   has a past medical history of Cerebrovascular accident (CVA) due to embolism of cerebral artery ([address] CKD (chronic kidney disease) stage 4, GFR 15-29 ml/min ([address] Coronary artery disease involving coronary bypass graft of native heart without angina pectoris, H/O lower gastrointestinal bleeding, Tobacco abuse, and Type II or unspecified type diabetes mellitus without mention of complication, not stated as uncontrolled. Past Surgical History:   has a past surgical history that includes Cardiac surgery. Home Medications:    Prior to Admission medications    Medication Sig Start Date End Date Taking?  Authorizing Provider   furosemide (LASIX) 40 MG tablet Take 1 tablet by mouth 2 times daily 5/13/22   Naman Partida MD   Multiple Vitamins-Minerals (PRESERVISION AREDS 2 PO) Take 1 tablet by mouth 2 times daily     Historical Provider, MD   acetaminophen (TYLENOL) 325 MG tablet Take 650 mg by mouth every 6 hours as needed    Historical Provider, MD   albuterol (PROVENTIL) (2.5 MG/3ML) 0.083% nebulizer solution Inhale 2.5 mg into the lungs every 6 hours as needed   Patient not taking: Reported on 6/29/2022    Historical Provider, MD   amiodarone (CORDARONE) 200 MG tablet Take 200 mg by mouth daily 2/24/22   Historical Provider, MD   apixaban (ELIQUIS) 5 MG TABS tablet Take 5 mg by mouth 2 times daily 3/14/22   Historical Provider, MD   aspirin 81 MG EC tablet Take 81 mg by mouth daily    Historical Provider, MD   atorvastatin (LIPITOR) 40 MG tablet Take 40 mg by mouth nightly  9/30/21   Historical Provider, MD   bisacodyl (DULCOLAX) 10 MG suppository Place 10 mg rectally daily     Historical Provider, MD   ergocalciferol (ERGOCALCIFEROL) 1.25 MG (91463 UT) capsule TAKE ONE CAPSULE BY MOUTH ONCE EVERY WEEK FOR VITAMIN (D) DEFICIENCY 10/12/21   Historical Provider, MD   famotidine (PEPCID) 20 MG tablet Take 20 mg by mouth 2 times daily  2/24/22   Historical Provider, MD   hydrALAZINE (APRESOLINE) 10 MG tablet Take 10 mg by mouth 3 times daily 4/19/22   Historical Provider, MD   insulin lispro, 1 Unit Dial, 100 UNIT/ML SOPN Per s/s 2/24/22   Historical Provider, MD   isosorbide dinitrate (ISORDIL) 10 MG tablet Take 10 mg by mouth 3 times daily 4/19/22   Historical Provider, MD   magnesium oxide (MAG-OX) 400 (240 Mg) MG tablet Take 200 mg by mouth daily 3/31/22   Historical Provider, MD metoprolol succinate (TOPROL XL) 25 MG extended release tablet Take 25 mg by mouth daily 9/30/21   Historical Provider, MD   QUEtiapine (SEROQUEL) 25 MG tablet Take 25 mg by mouth 2/24/22   Historical Provider, MD   atorvastatin (LIPITOR) 80 MG tablet Take 80 mg by mouth daily    Historical Provider, MD   GLIPIZIDE PO Take 5 mg by mouth daily     Historical Provider, MD   metFORMIN (GLUCOPHAGE) 850 MG tablet Take 850 mg by mouth 2 times daily (with meals)   Patient not taking: Reported on 6/29/2022    Historical Provider, MD        Current Facility-Administered Medications: pantoprazole (PROTONIX) 80 mg in sodium chloride 0.9 % 100 mL infusion, 8 mg/hr, IntraVENous, Continuous  [Held by provider] amiodarone (CORDARONE) tablet 200 mg, 200 mg, Oral, Daily  atorvastatin (LIPITOR) tablet 40 mg, 40 mg, Oral, Nightly  [Held by provider] hydrALAZINE (APRESOLINE) tablet 10 mg, 10 mg, Oral, TID  QUEtiapine (SEROQUEL) tablet 25 mg, 25 mg, Oral, Nightly  sodium chloride flush 0.9 % injection 5-40 mL, 5-40 mL, IntraVENous, 2 times per day  sodium chloride flush 0.9 % injection 5-40 mL, 5-40 mL, IntraVENous, PRN  0.9 % sodium chloride infusion, , IntraVENous, PRN  ondansetron (ZOFRAN-ODT) disintegrating tablet 4 mg, 4 mg, Oral, Q8H PRN **OR** ondansetron (ZOFRAN) injection 4 mg, 4 mg, IntraVENous, Q6H PRN  polyethylene glycol (GLYCOLAX) packet 17 g, 17 g, Oral, Daily PRN  acetaminophen (TYLENOL) tablet 650 mg, 650 mg, Oral, Q6H PRN **OR** acetaminophen (TYLENOL) suppository 650 mg, 650 mg, Rectal, Q6H PRN  dextrose 5 % and 0.45 % sodium chloride infusion, , IntraVENous, Continuous  albuterol (PROVENTIL) nebulizer solution 2.5 mg, 2.5 mg, Nebulization, Q2H PRN    Allergies:  Simvastatin    Social History:   reports that he quit smoking about 4 months ago. His smoking use included cigarettes. He smoked 1.50 packs per day. He has never used smokeless tobacco. He reports that he does not drink alcohol and does not use drugs. Family History: family history is not on file. REVIEW OF SYSTEMS:      · Constitutional: there has been no unanticipated weight loss. · Eyes: No visual changes or diplopia. · ENT: No Headaches  · Cardiovascular:  Remaining as above  · Respiratory: No cough  · Gastrointestinal: No abdominal pain. No change in bowel or bladder habits. · Genitourinary: No dysuria, trouble voiding, or hematuria. · Musculoskeletal: No joint complaints. · Neurological: No headache  · Hematologic/Lymphatic: No abnormal bruising or bleeding      PHYSICAL EXAM:      BP (!) 119/53   Pulse 55   Temp 97.8 °F (36.6 °C) (Axillary)   Resp 20   Ht 5' 10\" (1.778 m)   Wt 184 lb 11.9 oz (83.8 kg)   SpO2 94%   BMI 26.51 kg/m²      Intake/Output Summary (Last 24 hours) at 6/30/2022 1042  Last data filed at 6/30/2022 0700  Gross per 24 hour   Intake 1663.35 ml   Output 600 ml   Net 1063.35 ml         Constitutional and General Appearance:    Alert, cooperative, no distress and appears stated age  Respiratory:  · No for increased work of breathing. · On auscultation: diminished bilaterally  Cardiovascular:  · Regular S1 and S2.   · No murmurs  Abdomen:   · No masses or tenderness  · Bowel sounds present  Extremities:  ·  No Cyanosis or Clubbing  ·  Lower extremity edema: No  Neurological:  · Not performed    DATA:    Diagnostics:    ECHO 3/21/2022: EF 20-25%, mild AS, mild-moderate MR, trace TR.      CABG 2/7/2022: LIMA-LAD, SVG-OM/D2      LOOP 1/10/2022: [employer] loop implanted for cryptogenic CVA      VICTORIANO 1/10/2022: EF 40-45%, inferolateral WMA, no RCUZ thrombus.      EVENT MONITOR 8/7/2021: Event monitor with no atrial fibrillation. There were frequent PVCs   including 1 episode of nonsustained VT as above.    Labs:     CBC:   Recent Labs     06/29/22  1341 06/29/22 2022 06/30/22  0221 06/30/22  0914   WBC 8.0  --  7.4  --    HGB 4.9*   < > 6.8* 8.1*   HCT 15.7*   < > 22.0* 25.6*     --  230  --     < > = values in this interval not displayed. BMP:   Recent Labs     06/29/22  1341 06/30/22  0221   * 134*   K 4.4 3.8   CO2 21 21   BUN 81* 70*   CREATININE 2.91* 2.43*   LABGLOM 21* 26*   GLUCOSE 187* 169*     Pro-BNP:    Recent Labs     06/29/22  1341   PROBNP 4,184*     PT/INR:   Recent Labs     06/29/22  1341   PROTIME 21.0*   INR 1.9     APTT:  Recent Labs     06/29/22  1341   APTT 36.9*     LIVER PROFILE:  Recent Labs     06/29/22  1821 06/30/22  0221   AST 18 18   ALT 12 11   LABALBU 3.3* 3.1*         Patient's Active Problem List  Principal Problem:    GI bleed  Active Problems:    CKD (chronic kidney disease) stage 4, GFR 15-29 ml/min (HCC)    PAD (peripheral artery disease) (HCC)    Skin ulcer of right great toe, limited to breakdown of skin (HCC)    Ischemic cardiomyopathy    Paroxysmal atrial fibrillation (HCC)    Type 2 diabetes mellitus with complication ([facility] [address]  Resolved Problems:    * No resolved hospital problems. *        IMPRESSION:    1. Anemia likely secondary to GI bleed  2. CAD s/p CABG x2 in 2/22  3. ICM EF 20-25% on LifeVest  4. PAF on eliquis at home  5. T2DM  6. HTN  7. PAD  8. CKD  9. HLD    RECOMMENDATIONS:  1. Maintain Hb > 8  2. Continue amiodarone 200 mg daily  3. Continue statin  4. Hold plavix in setting of GI bleed  5. Hold eliquis  6. Hold anti hypertensive agents while hypotensive. Restart as BP can tolerate  7. K>4, Mg>2  8. GI on board, appreciate recommendations. 9. Recommend outpatient Watchman evaluation. Thank you for allowing us to participate in the care of [facility]. If you have any questions or concerns, please do not hesitate to contact us. Discussed with patient and Nurse. Enrico Dale MD  Fellow, [id number] [facility]        Please note that part of this chart were generated using voice recognition  dictation software.   Although every effort was made to ensure the accuracy of this automated transcription, some errors in transcription may have occurred. Attending Physician Statement  I have discussed the case of Neelam Burns including pertinent history and exam findings with the resident. I have seen and examined the patient and the key elements of the encounter have been performed by me. I agree with the assessment, plan and orders as documented by the resident With changes made to the note.      Electronically signed by Deann Garcia MD on 6/30/2022 at 3:00 PM.    [facility]      [phone]

## 2022-06-30 NOTE — PROGRESS NOTES
Spoke with Dr. Tristen Chowdary regarding patient monitoring for transport, results from EGD, need for protonix gtt. Patient diet.

## 2022-06-30 NOTE — OP NOTE
Operative Note      Patient: Valery Harding  YOB: 1945  MRN: [id number]    Date of Procedure: 6/30/2022    Pre-Op Diagnosis: ANEMIA, MELENA    Post-Op Diagnosis: Same   · Small hiatal hernia otherwise normal exam       Procedure(s):  ** BEDSIDE** EGD ESOPHAGOGASTRODUODENOSCOPY    Surgeon(s):  Ruchi Pérez MD    Assistant:   * No surgical staff found *    Anesthesia: IV Versed 2 mg, and IV fentanyl 25 mcg were given by the ICU nurse while patient was continuously monitored in the ICU with vital signs checked every 2 to 3 minutes and continuous pulse oximetry monitoring. Estimated Blood Loss (mL): None     Complications: None    Specimens:   * No specimens in log *    Implants:  * No implants in log *      Drains: * No LDAs found *    Findings:   1. 3 cm hiatal hernia  2. Otherwise the esophagus appeared normal without any stigmata of bleeding. 3. Stomach mucosa appeared normal on forward and retroflexed views. 4. The examined duodenum appeared normal.  5. There was no evidence of bleeding or stigmata of bleeding found in the stomach or duodenum. Recommendations:  1. May discontinue IV Protonix and change to once daily oral Protonix. 2. Likelihood of patient's anemia being contributed due to GI bleed is low. 3. Please obtain a CT chest abdomen pelvis to assess for any visceral or retroperitoneal bleeding. 4. If CT is negative, and if patient has continued signs of GI bleeding please obtain a nuclear medicine bleeding scan. 5. With patient's significant cardiac history, colonoscopy will be high risk and would likely be low yield. 6. May also consider hematologic work-up of anemia. 7. Discussed with ICU staff and the patient's wife. Detailed Description of Procedure:   Informed consent was obtained from the patient after explanation of the procedure including indications, description of the procedure,  benefits and possible risks and complications of the procedure, and alternatives. Questions were answered. The patient's history was reviewed and a directed physical examination was performed prior to the procedure. Patient was monitored throughout the procedure with pulse oximetry and periodic assessment of vital signs. Patient was sedated as noted above. With the patient in the left lateral decubitus position, the Olympus videoendoscope was placed in the patient's mouth and under direct visualization passed into the esophagus. Visualization of the esophagus, stomach, and duodenum was performed during both introduction and withdrawal of the endoscope and retroflexed view of the proximal stomach was obtained. The scope was passed to the 2nd portion of the duodenum. The patient tolerated the procedure well and was taken to the recovery area in good condition. The patient  was taken to the recovery area in good condition.     Electronically signed by Tyler Garcias MD on 6/30/2022 at 12:52 PM

## 2022-06-30 NOTE — PLAN OF CARE
Problem: Discharge Planning  Goal: Discharge to home or other facility with appropriate resources  Outcome: Progressing  Flowsheets (Taken 6/29/2022 2123)  Discharge to home or other facility with appropriate resources: Identify barriers to discharge with patient and caregiver     Problem: Skin/Tissue Integrity  Goal: Absence of new skin breakdown  Description: 1. Monitor for areas of redness and/or skin breakdown  2. Assess vascular access sites hourly  3. Every 4-6 hours minimum:  Change oxygen saturation probe site  4. Every 4-6 hours:  If on nasal continuous positive airway pressure, respiratory therapy assess nares and determine need for appliance change or resting period.   Outcome: Progressing     Problem: Safety - Adult  Goal: Free from fall injury  Outcome: Progressing     Problem: ABCDS Injury Assessment  Goal: Absence of physical injury  Outcome: Progressing     Problem: Neurosensory - Adult  Goal: Achieves stable or improved neurological status  Outcome: Progressing     Problem: Cardiovascular - Adult  Goal: Maintains optimal cardiac output and hemodynamic stability  Outcome: Progressing  Goal: Absence of cardiac dysrhythmias or at baseline  Outcome: Progressing     Problem: Respiratory - Adult  Goal: Achieves optimal ventilation and oxygenation  Outcome: Progressing     Problem: Gastrointestinal - Adult  Goal: Minimal or absence of nausea and vomiting  Outcome: Progressing  Goal: Maintains or returns to baseline bowel function  Outcome: Progressing  Goal: Maintains adequate nutritional intake  Outcome: Progressing     Problem: Genitourinary - Adult  Goal: Absence of urinary retention  Outcome: Progressing     Problem: Metabolic/Fluid and Electrolytes - Adult  Goal: Electrolytes maintained within normal limits  Outcome: Progressing  Goal: Hemodynamic stability and optimal renal function maintained  Outcome: Progressing  Goal: Glucose maintained within prescribed range  Outcome: Progressing Problem: Skin/Tissue Integrity - Adult  Goal: Skin integrity remains intact  Outcome: Progressing  Goal: Incisions, wounds, or drain sites healing without S/S of infection  Outcome: Progressing  Goal: Oral mucous membranes remain intact  Outcome: Progressing     Problem: Hematologic - Adult  Goal: Maintains hematologic stability  Outcome: Progressing     Problem: Musculoskeletal - Adult  Goal: Return mobility to safest level of function  Outcome: Progressing  Goal: Maintain proper alignment of affected body part  Outcome: Progressing  Goal: Return ADL status to a safe level of function  Outcome: Progressing

## 2022-06-30 NOTE — CONSULTS
[facility]  Consultation Note     . REASON FOR CONSULTATION:  GI bleed      HISTORY OF PRESENT ILLNESS:     This is a 68 y.o. male who presents with bleeding per rectum. Patient initially presented to [facility] ED with hypotension and weakness, home health nurse noted the blood pressure to be 80/30 and found to have rectal bleeding. Hemoglobin at [facility] was 4.9, patient was given 2 units PRBCs blood pressure improved. Patient has history of type 2 diabetes mellitus, hypertension, ischemic cardiomyopathy with EF of 20 to 25% wears a LifeVest ICD was not implanted due to foot infection, CAD status post CABG, atrial fibrillation on Eliquis, CKD stage III, foot ulcer    Home medications include Lasix, amiodarone, Eliquis, aspirin, Lipitor, Pepcid, hydralazine, insulin, metoprolol, Seroquel, metformin, albuterol    Recent admission to [place] [address] for NSTEMI and CHF. Recently was seen by vascular surgery for PAD, plan for right leg arteriogram with intervention    According to the chart review, patient Was admitted to [place] [address] in April with black tarry stools found to have GI bleed requiring blood transfusions, got colonoscopy status post cold forceps, cold snare, polypectomy, found to have polyps and internal hemorrhoids. Also could not find colonoscopy report in the chart.     On arrival to ED patient was afebrile  Heart rate in the low 50s  Blood pressure on the lower side    Initial lab work showed -   Hemoglobin 4.9, which improved to 7.1 after 2 blood transfusions  This morning hb is 6.8    Chronic NSAID use - denies  Smoking - former smoker  Alcohol - not currently      Previous GI history: colon polyps    PAST MEDICAL HISTORY:  Past Medical History:   Diagnosis Date    Cerebrovascular accident (CVA) due to embolism of cerebral artery (HCC)     CKD (chronic kidney disease) stage 4, GFR 15-29 ml/min ([facility])     Coronary artery disease involving coronary bypass graft of native heart without angina pectoris     H/O lower gastrointestinal bleeding     Tobacco abuse     Type II or unspecified type diabetes mellitus without mention of complication, not stated as uncontrolled      Past Surgical History:   Procedure Laterality Date    CARDIAC SURGERY         ALLERGIES:  Allergies   Allergen Reactions    Simvastatin Headaches       HOME MEDICATIONS:  Prior to Admission medications    Medication Sig Start Date End Date Taking?  Authorizing Provider   furosemide (LASIX) 40 MG tablet Take 1 tablet by mouth 2 times daily 5/13/22   Kristine Herring MD   Multiple Vitamins-Minerals (PRESERVISION AREDS 2 PO) Take 1 tablet by mouth 2 times daily     Historical Provider, MD   acetaminophen (TYLENOL) 325 MG tablet Take 650 mg by mouth every 6 hours as needed    Historical Provider, MD   albuterol (PROVENTIL) (2.5 MG/3ML) 0.083% nebulizer solution Inhale 2.5 mg into the lungs every 6 hours as needed   Patient not taking: Reported on 6/29/2022    Historical Provider, MD   amiodarone (CORDARONE) 200 MG tablet Take 200 mg by mouth daily 2/24/22   Historical Provider, MD   apixaban (ELIQUIS) 5 MG TABS tablet Take 5 mg by mouth 2 times daily 3/14/22   Historical Provider, MD   aspirin 81 MG EC tablet Take 81 mg by mouth daily    Historical Provider, MD   atorvastatin (LIPITOR) 40 MG tablet Take 40 mg by mouth nightly  9/30/21   Historical Provider, MD   bisacodyl (DULCOLAX) 10 MG suppository Place 10 mg rectally daily     Historical Provider, MD   ergocalciferol (ERGOCALCIFEROL) 1.25 MG (38959 UT) capsule TAKE ONE CAPSULE BY MOUTH ONCE EVERY WEEK FOR VITAMIN (D) DEFICIENCY 10/12/21   Historical Provider, MD   famotidine (PEPCID) 20 MG tablet Take 20 mg by mouth 2 times daily  2/24/22   Historical Provider, MD   hydrALAZINE (APRESOLINE) 10 MG tablet Take 10 mg by mouth 3 times daily 4/19/22   Historical Provider, MD   insulin lispro, 1 Unit Dial, 100 UNIT/ML SOPN Per s/s 2/24/22   Historical Provider, MD   isosorbide dinitrate (ISORDIL) 10 MG tablet Take 10 mg by mouth 3 times daily 22   Historical Provider, MD   magnesium oxide (MAG-OX) 400 (240 Mg) MG tablet Take 200 mg by mouth daily 3/31/22   Historical Provider, MD   metoprolol succinate (TOPROL XL) 25 MG extended release tablet Take 25 mg by mouth daily 21   Historical Provider, MD   QUEtiapine (SEROQUEL) 25 MG tablet Take 25 mg by mouth 22   Historical Provider, MD   atorvastatin (LIPITOR) 80 MG tablet Take 80 mg by mouth daily    Historical Provider, MD   GLIPIZIDE PO Take 5 mg by mouth daily     Historical Provider, MD   metFORMIN (GLUCOPHAGE) 850 MG tablet Take 850 mg by mouth 2 times daily (with meals)   Patient not taking: Reported on 2022    Historical Provider, MD     .  CURRENT MEDICATIONS:  Scheduled Meds:   amiodarone  200 mg Oral Daily    atorvastatin  40 mg Oral Nightly    hydrALAZINE  10 mg Oral TID    QUEtiapine  25 mg Oral Nightly    sodium chloride flush  5-40 mL IntraVENous 2 times per day     . Continuous Infusions:   pantoprazole 8 mg/hr (22 0700)    sodium chloride      dextrose 5 % and 0.45 % NaCl 75 mL/hr at 22 0700     . PRN Meds:sodium chloride flush, sodium chloride, ondansetron **OR** ondansetron, polyethylene glycol, acetaminophen **OR** acetaminophen, albuterol  .   SOCIAL HISTORY:     Social History     Socioeconomic History    Marital status:      Spouse name: Not on file    Number of children: Not on file    Years of education: Not on file    Highest education level: Not on file   Occupational History    Not on file   Tobacco Use    Smoking status: Former Smoker     Packs/day: 1.50     Types: Cigarettes     Quit date: 2022     Years since quittin.3    Smokeless tobacco: Never Used   Substance and Sexual Activity    Alcohol use: No    Drug use: No    Sexual activity: Not on file   Other Topics Concern    Not on file   Social History Narrative    Not on file     Social Determinants of Health     Financial Resource Strain: Low Risk     Difficulty of Paying Living Expenses: Not hard at all   Food Insecurity: No Food Insecurity    Worried About Running Out of Food in the Last Year: Never true    Rylie of Food in the Last Year: Never true   Transportation Needs:     Lack of Transportation (Medical): Not on file    Lack of Transportation (Non-Medical): Not on file   Physical Activity:     Days of Exercise per Week: Not on file    Minutes of Exercise per Session: Not on file   Stress:     Feeling of Stress : Not on file   Social Connections:     Frequency of Communication with Friends and Family: Not on file    Frequency of Social Gatherings with Friends and Family: Not on file    Attends Buddhist Services: Not on file    Active Member of [address] [place] [address] or Organizations: Not on file    Attends Club or Organization Meetings: Not on file    Marital Status: Not on file   Intimate Partner Violence:     Fear of Current or Ex-Partner: Not on file    Emotionally Abused: Not on file    Physically Abused: Not on file    Sexually Abused: Not on file   Housing Stability:     Unable to Pay for Housing in the Last Year: Not on file    Number of [place] in the Last Year: Not on file    Unstable Housing in the Last Year: Not on file       FAMILY HISTORY:   No family history on file. REVIEW OF SYSTEMS:     Constitutional: No fever, no chills, no lethargy, no weakness. HEENT:  No headache, otalgia, itchy eyes, nasal discharge or sore throat. Cardiac:  No chest pain, dyspnea, orthopnea or PND. Chest:   No cough, phlegm or wheezing. Abdomen:  Denies any, denies any bloody stools that he saw  Neuro:  No focal weakness, abnormal movements or seizure like activity. Skin:   No rashes, no itching. :   No hematuria, no pyuria, no dysuria, no flank pain. Extremities:  No swelling or joint pains. ROS was otherwise negative except as mentioned in the [address]      PHYSICAL EXAM:    BP (!) 119/53   Pulse 55   Temp 97.8 °F (36.6 °C) (Axillary)   Resp 20   Ht 5' 10\" (1.778 m)   Wt 184 lb 11.9 oz (83.8 kg)   SpO2 94%   BMI 26.51 kg/m²   . TMAX[24]    General: Well developed, Well nourished, No apparent distress  Head:  Normocephalic, Atraumatic  EENT: EOMI, Sclera not icteric, Oropharynx moist  Neck:  Supple, Trachea midline  Lungs: CTA Bilaterally  Heart: RRR, No murmur, No rub, No gallop, PMI nondisplaced. Abdomen: Soft, Non tender, Not distended, BS WNL,  No masses, BELINDA showed soft dark brown stool  Ext:No clubbing. No cyanosis. No edema. Skin: No rashes. No jaundice. No stigmata of liver disease. Neuro:  A&O x Three, No focal neurological deficits    LABS and IMAGING:     Hemotological labs:   ANEMIA STUDIES  No results for input(s): LABIRON, TIBC, FERRITIN, UPFEVMDE64, FOLATE, OCCULTBLD in the last 72 hours. CBC  Recent Labs     06/29/22  1341 06/29/22  2022 06/29/22  2303 06/30/22 0221   WBC 8.0  --   --  7.4   HGB 4.9* 7.1* 7.1* 6.8*   MCV 83.5  --   --  84.9   RDW 18.2*  --   --  17.6*     --   --  230       PT/INR  Recent Labs     06/29/22  1341   PROTIME 21.0*   INR 1.9       BMP  Recent Labs     06/28/22  1307 06/29/22  1341 06/30/22  0221   * 130* 134*   K 3.8 4.4 3.8   CL 96* 95* 99   CO2 19* 21 21   BUN 85* 81* 70*   CREATININE 2.84* 2.91* 2.43*   GLUCOSE 179* 187* 169*   CALCIUM 8.9 8.7 8.5*       LIVER WORK UP  Hepatitis Functional Panel:  Recent Labs     06/30/22  0221   ALKPHOS 68   ALT 11   AST 18   PROT 5.1*   BILITOT 0.40   BILIDIR 0.13   LABALBU 3.1*       AMYLASE/LIPASE/AMMONIA  No results for input(s): AMYLASE, LIPASE, AMMONIA in the last 72 hours.     Acute Hepatitis Panel   No results found for: HEPBSAG, HEPCAB, HEPBIGM, HEPAIGM    HCV Genotype   No results found for: HEPATITISCGENOTYPE    HCV Quantitative   No results found for: HCVQNT    Metabolic work up:  Ceruloplasmin  AA work up:  Alpha 1 antitrypsin   No results found for: A1A  AMA:  No results found for: [employer]    ASMA:  No results found for: SMOOTHMUSCAB    ANCA:    JOB:  No results found for: JOB    Anti - Liver/Kidney Ab:  No results found for: LIVER-KIDNEYMICROSOMALAB    Ceruloplasmin:  No results found for: CERULOPLSM    Celiac panel:  No results found for: TISSTRNTIIGG, TTGIGA, IGA    Cancer Markers:  CEA:    No results for input(s): CEA in the last 72 hours. Ca 125:   No results for input(s):  in the last 72 hours. Ca 19-9:     Invalid input(s):   AFP: No results for input(s): AFP in the last 72 hours. ASSESSMENT and PLAN:     1.  Melena -   - will plan for EGD  - NPO  - continue Protonix infusion  - monitor H and H, transfuse as needed  - further recommendations to follow    This plan was formulated in collaboration with Dr Dimple Caceres  PGY-1  Internal Medicine  [facility]   7:32 AM 6/30/2022

## 2022-07-01 LAB
ABO/RH: NORMAL
ABO/RH: NORMAL
ABSOLUTE EOS #: 0.27 K/UL (ref 0–0.44)
ABSOLUTE IMMATURE GRANULOCYTE: 0.04 K/UL (ref 0–0.3)
ABSOLUTE LYMPH #: 1.19 K/UL (ref 1.1–3.7)
ABSOLUTE MONO #: 1.05 K/UL (ref 0.1–1.2)
ANION GAP SERPL CALCULATED.3IONS-SCNC: 11 MMOL/L (ref 9–17)
ANTIBODY SCREEN: NEGATIVE
ANTIBODY SCREEN: NEGATIVE
ARM BAND NUMBER: NORMAL
ARM BAND NUMBER: NORMAL
BASOPHILS # BLD: 1 % (ref 0–2)
BASOPHILS ABSOLUTE: 0.05 K/UL (ref 0–0.2)
BLD PROD TYP BPU: NORMAL
BLOOD BANK BLOOD PRODUCT EXPIRATION DATE: NORMAL
BLOOD BANK ISBT PRODUCT BLOOD TYPE: 600
BLOOD BANK ISBT PRODUCT BLOOD TYPE: 600
BLOOD BANK ISBT PRODUCT BLOOD TYPE: 9500
BLOOD BANK PRODUCT CODE: NORMAL
BLOOD BANK UNIT TYPE AND RH: NORMAL
BPU ID: NORMAL
BUN BLDV-MCNC: 50 MG/DL (ref 8–23)
CALCIUM SERPL-MCNC: 8.5 MG/DL (ref 8.6–10.4)
CHLORIDE BLD-SCNC: 103 MMOL/L (ref 98–107)
CO2: 21 MMOL/L (ref 20–31)
CREAT SERPL-MCNC: 1.99 MG/DL (ref 0.7–1.2)
CROSSMATCH RESULT: NORMAL
DISPENSE STATUS BLOOD BANK: NORMAL
EKG ATRIAL RATE: 227 BPM
EKG Q-T INTERVAL: 506 MS
EKG QRS DURATION: 160 MS
EKG QTC CALCULATION (BAZETT): 474 MS
EKG R AXIS: 47 DEGREES
EKG T AXIS: -114 DEGREES
EKG VENTRICULAR RATE: 53 BPM
EOSINOPHILS RELATIVE PERCENT: 3 % (ref 1–4)
EXPIRATION DATE: NORMAL
EXPIRATION DATE: NORMAL
GFR AFRICAN AMERICAN: 40 ML/MIN
GFR NON-AFRICAN AMERICAN: 33 ML/MIN
GFR SERPL CREATININE-BSD FRML MDRD: ABNORMAL ML/MIN/{1.73_M2}
GLUCOSE BLD-MCNC: 80 MG/DL (ref 70–99)
HCT VFR BLD CALC: 24.8 % (ref 40.7–50.3)
HCT VFR BLD CALC: 25.4 % (ref 40.7–50.3)
HCT VFR BLD CALC: 25.9 % (ref 40.7–50.3)
HCT VFR BLD CALC: 26.4 % (ref 40.7–50.3)
HEMOGLOBIN: 8 G/DL (ref 13–17)
HEMOGLOBIN: 8 G/DL (ref 13–17)
HEMOGLOBIN: 8.2 G/DL (ref 13–17)
HEMOGLOBIN: 8.2 G/DL (ref 13–17)
IMMATURE GRANULOCYTES: 1 %
LYMPHOCYTES # BLD: 14 % (ref 24–43)
MAGNESIUM: 1.8 MG/DL (ref 1.6–2.6)
MCH RBC QN AUTO: 27.6 PG (ref 25.2–33.5)
MCHC RBC AUTO-ENTMCNC: 31.5 G/DL (ref 28.4–34.8)
MCV RBC AUTO: 87.6 FL (ref 82.6–102.9)
MONOCYTES # BLD: 13 % (ref 3–12)
NRBC AUTOMATED: 0 PER 100 WBC
PARTIAL THROMBOPLASTIN TIME: 29.3 SEC (ref 20.5–30.5)
PARTIAL THROMBOPLASTIN TIME: 43 SEC (ref 20.5–30.5)
PDW BLD-RTO: 17.7 % (ref 11.8–14.4)
PHOSPHORUS: 3.1 MG/DL (ref 2.5–4.5)
PLATELET # BLD: 228 K/UL (ref 138–453)
PMV BLD AUTO: 10.5 FL (ref 8.1–13.5)
POTASSIUM SERPL-SCNC: 3.9 MMOL/L (ref 3.7–5.3)
RBC # BLD: 2.9 M/UL (ref 4.21–5.77)
RBC # BLD: ABNORMAL 10*6/UL
SEG NEUTROPHILS: 68 % (ref 36–65)
SEGMENTED NEUTROPHILS ABSOLUTE COUNT: 5.78 K/UL (ref 1.5–8.1)
SODIUM BLD-SCNC: 135 MMOL/L (ref 135–144)
TRANSFUSION STATUS: NORMAL
UNIT DIVISION: 0
UNIT ISSUE DATE/TIME: NORMAL
WBC # BLD: 8.4 K/UL (ref 3.5–11.3)

## 2022-07-01 PROCEDURE — 2580000003 HC RX 258: Performed by: STUDENT IN AN ORGANIZED HEALTH CARE EDUCATION/TRAINING PROGRAM

## 2022-07-01 PROCEDURE — 93010 ELECTROCARDIOGRAM REPORT: CPT | Performed by: INTERNAL MEDICINE

## 2022-07-01 PROCEDURE — 93005 ELECTROCARDIOGRAM TRACING: CPT | Performed by: STUDENT IN AN ORGANIZED HEALTH CARE EDUCATION/TRAINING PROGRAM

## 2022-07-01 PROCEDURE — 6370000000 HC RX 637 (ALT 250 FOR IP): Performed by: STUDENT IN AN ORGANIZED HEALTH CARE EDUCATION/TRAINING PROGRAM

## 2022-07-01 PROCEDURE — 85025 COMPLETE CBC W/AUTO DIFF WBC: CPT

## 2022-07-01 PROCEDURE — 85014 HEMATOCRIT: CPT

## 2022-07-01 PROCEDURE — 85018 HEMOGLOBIN: CPT

## 2022-07-01 PROCEDURE — 36415 COLL VENOUS BLD VENIPUNCTURE: CPT

## 2022-07-01 PROCEDURE — 83735 ASSAY OF MAGNESIUM: CPT

## 2022-07-01 PROCEDURE — 80048 BASIC METABOLIC PNL TOTAL CA: CPT

## 2022-07-01 PROCEDURE — 85730 THROMBOPLASTIN TIME PARTIAL: CPT

## 2022-07-01 PROCEDURE — 84100 ASSAY OF PHOSPHORUS: CPT

## 2022-07-01 PROCEDURE — APPSS30 APP SPLIT SHARED TIME 16-30 MINUTES: Performed by: INTERNAL MEDICINE

## 2022-07-01 PROCEDURE — 2060000000 HC ICU INTERMEDIATE R&B

## 2022-07-01 PROCEDURE — 99232 SBSQ HOSP IP/OBS MODERATE 35: CPT | Performed by: INTERNAL MEDICINE

## 2022-07-01 PROCEDURE — 6360000002 HC RX W HCPCS: Performed by: STUDENT IN AN ORGANIZED HEALTH CARE EDUCATION/TRAINING PROGRAM

## 2022-07-01 RX ORDER — HEPARIN SODIUM 1000 [USP'U]/ML
60 INJECTION, SOLUTION INTRAVENOUS; SUBCUTANEOUS ONCE
Status: DISCONTINUED | OUTPATIENT
Start: 2022-07-01 | End: 2022-07-01

## 2022-07-01 RX ORDER — HEPARIN SODIUM 1000 [USP'U]/ML
60 INJECTION, SOLUTION INTRAVENOUS; SUBCUTANEOUS PRN
Status: DISCONTINUED | OUTPATIENT
Start: 2022-07-01 | End: 2022-07-01

## 2022-07-01 RX ORDER — PANTOPRAZOLE SODIUM 40 MG/1
40 TABLET, DELAYED RELEASE ORAL
Status: DISCONTINUED | OUTPATIENT
Start: 2022-07-02 | End: 2022-07-02 | Stop reason: HOSPADM

## 2022-07-01 RX ORDER — HEPARIN SODIUM AND DEXTROSE 10000; 5 [USP'U]/100ML; G/100ML
5-30 INJECTION INTRAVENOUS CONTINUOUS
Status: DISCONTINUED | OUTPATIENT
Start: 2022-07-01 | End: 2022-07-02

## 2022-07-01 RX ORDER — HEPARIN SODIUM 1000 [USP'U]/ML
30 INJECTION, SOLUTION INTRAVENOUS; SUBCUTANEOUS PRN
Status: DISCONTINUED | OUTPATIENT
Start: 2022-07-01 | End: 2022-07-01

## 2022-07-01 RX ORDER — ASPIRIN 81 MG/1
81 TABLET ORAL DAILY
Status: DISCONTINUED | OUTPATIENT
Start: 2022-07-01 | End: 2022-07-02 | Stop reason: HOSPADM

## 2022-07-01 RX ADMIN — PANTOPRAZOLE SODIUM 40 MG: 40 TABLET, DELAYED RELEASE ORAL at 13:17

## 2022-07-01 RX ADMIN — QUETIAPINE FUMARATE 25 MG: 25 TABLET ORAL at 20:40

## 2022-07-01 RX ADMIN — SODIUM CHLORIDE, PRESERVATIVE FREE 10 ML: 5 INJECTION INTRAVENOUS at 12:34

## 2022-07-01 RX ADMIN — AMIODARONE HYDROCHLORIDE 100 MG: 200 TABLET ORAL at 08:32

## 2022-07-01 RX ADMIN — SODIUM CHLORIDE, PRESERVATIVE FREE 10 ML: 5 INJECTION INTRAVENOUS at 20:40

## 2022-07-01 RX ADMIN — ASPIRIN 81 MG: 81 TABLET, COATED ORAL at 13:17

## 2022-07-01 RX ADMIN — MIDODRINE HYDROCHLORIDE 10 MG: 5 TABLET ORAL at 08:34

## 2022-07-01 RX ADMIN — DESMOPRESSIN ACETATE 40 MG: 0.2 TABLET ORAL at 20:40

## 2022-07-01 RX ADMIN — HEPARIN SODIUM AND DEXTROSE 12 UNITS/KG/HR: 10000; 5 INJECTION INTRAVENOUS at 12:47

## 2022-07-01 NOTE — PROGRESS NOTES
[facility]   Progress Note                   Date:   7/1/2022  Patient name: Alie Kat  Date of admission:  6/29/2022  5:58 PM  MRN:   [id number]  YOB: 1945  PCP: Zuly Mederos MD    Reason for Admission:      Subjective:       Patient seen and examined at bedside. Had a EGD yesterday for anemia but did not reveal any source of bleeding. CT abdominal pelvis was obtained subsequently which ruled out any retroperitoneal/visceral hematoma. Hemodynamics are stable. In normal sinus rhythm, heart rate 80s. Maintaining good blood pressure with midodrine 10 TID. No new complaints today. Hb 8 today    Medications:   Scheduled Meds:   amiodarone  100 mg Oral Daily    pantoprazole (PROTONIX) 40 mg injection  40 mg IntraVENous Daily    midodrine  10 mg Oral TID WC    atorvastatin  40 mg Oral Nightly    [Held by provider] hydrALAZINE  10 mg Oral TID    QUEtiapine  25 mg Oral Nightly    sodium chloride flush  5-40 mL IntraVENous 2 times per day       Continuous Infusions:   sodium chloride         CBC:   Recent Labs     06/29/22  1341 06/29/22 2022 06/30/22  0221 06/30/22  0221 06/30/22  0914 06/30/22  1831 06/30/22  2211   WBC 8.0  --  7.4  --   --   --   --    HGB 4.9*   < > 6.8*   < > 8.1* 8.5* 8.4*     --  230  --   --   --   --     < > = values in this interval not displayed.      BMP:    Recent Labs     06/28/22  1307 06/29/22  1341 06/30/22  0221   * 130* 134*   K 3.8 4.4 3.8   CL 96* 95* 99   CO2 19* 21 21   BUN 85* 81* 70*   CREATININE 2.84* 2.91* 2.43*   GLUCOSE 179* 187* 169*     Hepatic:   Recent Labs     06/29/22  1341 06/29/22  1821 06/30/22  0221   AST 25 18 18   ALT 12 12 11   BILITOT 0.19* 0.27* 0.40   ALKPHOS 74 67 68     INR:   Recent Labs     06/29/22  1341 06/30/22  1001   INR 1.9 1.2       Objective:   Vitals: BP (!) 107/55   Pulse 65   Temp 97.7 °F (36.5 °C) (Oral)   Resp 23   Ht 5' 10\" (1.778 m)   Wt 184 lb 11.9 oz (83.8 kg)   SpO2 94%   BMI 26.51 kg/m²     General appearance: awake, alert, in no apparent respiratory distress   HEENT: Head: Normocephalic, no lesions, without obvious abnormality  Neck: no JVD  Lungs: clear to auscultation bilaterally, no basilar rales, no wheezing   Heart: regular rate and rhythm, S1, S2 normal, no murmur, click, rub or gallop  Abdomen: soft, non-tender; bowel sounds normal  Extremities: No LE edema  Neurologic: Mental status: Alert, oriented. Motor and sensory not done. DATA:    Diagnostics:    KLNQ 2/08/2361: EF 20-25%, mild AS, mild-moderate MR, trace TR.      CABG 2/7/2022: LIMA-LAD, SVG-OM/D2      LOOP 1/10/2022: [employer] loop implanted for cryptogenic CVA      VICTORIANO 1/10/2022: EF 40-45%, inferolateral WMA, no CRUZ thrombus.      EVENT MONITOR 8/7/2021: Event monitor with no atrial fibrillation. There were frequent PVCs   including 1 episode of nonsustained VT as above. Assessment:   1. Acute on chronic anemia without any source being identified at this point. Chronicity of anemia likely related to kidney disease  2. CAD s/p CABG x2 in 2/22  3. ICM EF 20-25% on LifeVest  4. PAF on eliquis at home  5. T2DM  6. HTN  7. PAD  8. CKD  9. HLD    Treatment Plan:   1. Reviewed EGD without any clear source of bleeding  2. Troponin elevation type II in he setting of severe anemia and CKD   3. Continue amiodarone 200 mg daily  4. Continue statin. 5. Discontinue Eliquis on discharge as risk of being on Eliquis is higher than benefit due to recurrent episodes of acute on chronic anemia. Will consider resuming as OP if cleared by GI  6. Restart beta-blocker as blood pressure tolerates. Currently on midodrine  7. K>4, Mg>2  8. Recommend outpatient Watchman evaluation  9. Cardiology to sign off at this point, please call with any questions      Discussed with patient and nursing. Beecher Kehr, MD  Internal Medicine Resident, PGY-3  [facility];  [place], [place]  7/1/2022,7:34 AM       Attending Physician Statement  I have discussed the case of Yolanda Clark including pertinent history and exam findings with the resident. I have seen and examined the patient and the key elements of the encounter have been performed by me. I agree with the assessment, plan and orders as documented by the resident With changes made to the note.      Electronically signed by Lucas Oneill MD on 7/1/2022 at 1:57 PM.    [facility]      [phone]

## 2022-07-01 NOTE — PLAN OF CARE
Problem: Discharge Planning  Goal: Discharge to home or other facility with appropriate resources  Outcome: Progressing     Problem: Skin/Tissue Integrity  Goal: Absence of new skin breakdown  Description: 1. Monitor for areas of redness and/or skin breakdown  2. Assess vascular access sites hourly  3. Every 4-6 hours minimum:  Change oxygen saturation probe site  4. Every 4-6 hours:  If on nasal continuous positive airway pressure, respiratory therapy assess nares and determine need for appliance change or resting period.   Outcome: Progressing     Problem: Safety - Adult  Goal: Free from fall injury  Outcome: Progressing     Problem: ABCDS Injury Assessment  Goal: Absence of physical injury  Outcome: Progressing     Problem: Neurosensory - Adult  Goal: Achieves stable or improved neurological status  Outcome: Progressing     Problem: Cardiovascular - Adult  Goal: Maintains optimal cardiac output and hemodynamic stability  Outcome: Progressing  Goal: Absence of cardiac dysrhythmias or at baseline  Outcome: Progressing     Problem: Respiratory - Adult  Goal: Achieves optimal ventilation and oxygenation  Outcome: Progressing     Problem: Gastrointestinal - Adult  Goal: Minimal or absence of nausea and vomiting  Outcome: Progressing  Goal: Maintains or returns to baseline bowel function  Outcome: Progressing  Goal: Maintains adequate nutritional intake  Outcome: Progressing     Problem: Genitourinary - Adult  Goal: Absence of urinary retention  Outcome: Progressing     Problem: Metabolic/Fluid and Electrolytes - Adult  Goal: Electrolytes maintained within normal limits  Outcome: Progressing  Goal: Hemodynamic stability and optimal renal function maintained  Outcome: Progressing  Goal: Glucose maintained within prescribed range  Outcome: Progressing     Problem: Skin/Tissue Integrity - Adult  Goal: Skin integrity remains intact  Outcome: Progressing  Goal: Incisions, wounds, or drain sites healing without S/S of infection  Outcome: Progressing  Goal: Oral mucous membranes remain intact  Outcome: Progressing     Problem: Hematologic - Adult  Goal: Maintains hematologic stability  Outcome: Progressing     Problem: Musculoskeletal - Adult  Goal: Return mobility to safest level of function  Outcome: Progressing  Goal: Maintain proper alignment of affected body part  Outcome: Progressing  Goal: Return ADL status to a safe level of function  Outcome: Progressing     Problem: Chronic Conditions and Co-morbidities  Goal: Patient's chronic conditions and co-morbidity symptoms are monitored and maintained or improved  Outcome: Progressing

## 2022-07-01 NOTE — PROGRESS NOTES
INTENSIVE CARE UNIT  Resident Physician Progress Note    Patient - Neelam Katy  Date of Admission -  2022  5:58 PM  Date of Evaluation -  2022  Room and Bed Number -  3379/3425-54   Hospital Day - 2      SUBJECTIVE:     OVERNIGHT EVENTS:    No acute events overnight. Patient is alert and oriented and no complaints. No nausea or vomiting and no episode of melena.   Can be moved out of ICU as he is vitally stable      AWAKE & FOLLOWING COMMANDS:  [] No   [x] Yes    SECRETIONS Amount:  [] Small [] Moderate  [] Large  [] None  Color:     [] White [] Colored  [] Bloody    SEDATION:  RAAS Score:  [] Propofol gtt  [] Versed gtt  [] Ativan gtt   [x] No Sedation    PARALYZED:  [x] No    [] Yes    VASOPRESSORS:  [x] No    [] Yes  [] Levophed [] Dopamine [] Vasopressin  [] Dobutamine [] Phenylephrine [] Epinephrine      OBJECTIVE:     VITAL SIGNS:  BP (!) 131/108   Pulse 61   Temp 97.8 °F (36.6 °C) (Oral)   Resp 22   Ht 5' 10\" (1.778 m)   Wt 184 lb 11.9 oz (83.8 kg)   SpO2 92%   BMI 26.51 kg/m²   Tmax over 24 hours:  Temp (24hrs), Av °F (36.7 °C), Min:97.7 °F (36.5 °C), Max:98.4 °F (36.9 °C)      Patient Vitals for the past 8 hrs:   BP Temp Temp src Pulse Resp SpO2   22 0700 -- -- -- 61 22 92 %   22 0645 -- -- -- 59 22 93 %   22 0630 -- -- -- 60 21 92 %   22 0615 -- -- -- 63 21 92 %   22 0600 (!) 131/108 -- -- 79 21 96 %   22 0545 -- -- -- 71 20 93 %   22 0530 -- -- -- 59 17 94 %   22 0515 -- -- -- 67 21 96 %   22 0500 (!) 104/46 -- -- 60 17 94 %   22 0445 -- -- -- 68 22 93 %   22 0430 -- -- -- 59 20 94 %   22 0415 -- -- -- 64 21 95 %   22 0400 (!) 128/42 97.8 °F (36.6 °C) Oral 66 19 95 %   22 0345 -- -- -- 59 20 94 %   22 0330 -- -- -- 62 19 94 %   22 0315 -- -- -- 59 22 94 %   22 0300 (!) 103/36 -- -- 62 19 95 %   22 0245 -- -- -- 59 19 93 %   22 0230 (!) 106/42 -- -- 60 20 93 %   22 0215 -- -- -- 72 22 92 %   07/01/22 0200 (!) 123/48 97.9 °F (36.6 °C) Oral 79 19 94 %   07/01/22 0145 -- -- -- 82 14 92 %   07/01/22 0130 (!) 111/44 -- -- 62 22 92 %   07/01/22 0115 -- -- -- 69 21 93 %   07/01/22 0100 (!) 115/44 -- -- 81 21 93 %   07/01/22 0045 -- -- -- 61 23 (!) 89 %   07/01/22 0030 (!) 99/42 -- -- 85 17 90 %   07/01/22 0015 -- -- -- 62 24 91 %   07/01/22 0000 (!) 107/55 97.7 °F (36.5 °C) Oral 65 23 94 %         Intake/Output Summary (Last 24 hours) at 7/1/2022 0735  Last data filed at 7/1/2022 0733  Gross per 24 hour   Intake 1428.09 ml   Output 2695 ml   Net -1266.91 ml     Date 07/01/22 0000 - 07/01/22 2359   Shift 4744-0367 0067-2854 4679-1534 24 Hour Total   INTAKE   Shift Total(mL/kg)       OUTPUT   Urine(mL/kg/hr) 800   800   Shift Total(mL/kg) 800(9.5)   800(9.5)   Weight (kg) 83.8 83.8 83.8 83.8     Wt Readings from Last 3 Encounters:   06/29/22 184 lb 11.9 oz (83.8 kg)   06/29/22 183 lb (83 kg)   06/27/22 186 lb (84.4 kg)     Body mass index is 26.51 kg/m². PHYSICAL EXAM:  GEN:  awake, alert, cooperative, no apparent distress, and appears stated age  EYES:  Lids and lashes normal, pupils equal, round and reactive to light, extra ocular muscles intact, sclera clear, conjunctiva normal  HEENT:  Normocephalic, without obvious abnormality, atramatic, sinuses nontender on palpation, external ears without lesions, oral pharynx with moist mucus membranes, tonsils without erythema or exudates, gums normal and good dentition. LUNGS:  No increased work of breathing, good air exchange, clear to auscultation bilaterally, no crackles or wheezing , LIFE VEST  CV:    Normal apical impulse, regular rate and rhythm, normal S1 and S2, no S3 or S4, and no murmur noted  ABDOMEN:   No scars, normal bowel sounds, soft, non-distended, non-tender, no masses   MSK:    There is no redness, warmth, or swelling of the joints. Full range of motion noted.   Motor strength is 5 out of 5 all extremities bilaterally. Tone is normal.  NEURO[de-identified]   Awake, alert, oriented to name, place and time. Cranial nerves II-XII are grossly intact. Motor is 5 out of 5 bilaterally. Cerebellar finger to nose, heel to shin intact. Sensory is intact. Babinski down going, Romberg negative, and gait is normal.  SKIN:   No bruising or bleeding. Normal skin color, texture, and turgor. No redness, warmth, or swelling. No rashes, lesions, or abnormal moles. EXTREMITIES:  No pedal or leg edema, no calf tenderness/swelling, no erythema, distal pulses intact       MEDICATIONS:  Scheduled Meds:   amiodarone  100 mg Oral Daily    pantoprazole (PROTONIX) 40 mg injection  40 mg IntraVENous Daily    midodrine  10 mg Oral TID WC    atorvastatin  40 mg Oral Nightly    [Held by provider] hydrALAZINE  10 mg Oral TID    QUEtiapine  25 mg Oral Nightly    sodium chloride flush  5-40 mL IntraVENous 2 times per day     Continuous Infusions:   sodium chloride       PRN Meds:   sodium chloride flush, 5-40 mL, PRN  sodium chloride, , PRN  ondansetron, 4 mg, Q8H PRN   Or  ondansetron, 4 mg, Q6H PRN  polyethylene glycol, 17 g, Daily PRN  acetaminophen, 650 mg, Q6H PRN   Or  acetaminophen, 650 mg, Q6H PRN  albuterol, 2.5 mg, Q2H PRN        SUPPORT DEVICES: [] Ventilator [] BIPAP  [] Nasal Cannula [] Room Air    VENT SETTINGS (Comprehensive) (if applicable):        Additional Respiratory Assessments  Heart Rate: 61  Resp: 22  SpO2: 92 %    ABGs:     No results found for: PHART, PH, LDH9EYR, PCO2, PO2ART, PO2, SIO3XCS, HCO3, BEART, BE, THGBART, THB, OZB2NNU, V2EKWJSC, O2SAT, FIO2      DATA:  Complete Blood Count:   Recent Labs     06/29/22  1341 06/29/22  2022 06/30/22  0221 06/30/22  0914 06/30/22  1831 06/30/22  2211 07/01/22  0553   WBC 8.0  --  7.4  --   --   --  8.4   RBC 1.88*  --  2.59*  --   --   --  2.90*   HGB 4.9*   < > 6.8*   < > 8.5* 8.4* 8.0*   HCT 15.7*   < > 22.0*   < > 27.4* 25.9* 25.4*   MCV 83.5  --  84.9  --   --   --  87.6   MCH 26.1  --  26.3  --   --   --  27.6   MCHC 31.2  --  30.9  --   --   --  31.5   RDW 18.2*  --  17.6*  --   --   --  17.7*     --  230  --   --   --  228   MPV 10.2  --  10.7  --   --   --  10.5    < > = values in this interval not displayed.         Last 3 Blood Glucose:   Recent Labs     06/28/22  1307 06/29/22  1341 06/30/22 0221 07/01/22  0553   GLUCOSE 179* 187* 169* 80        PT/INR:    Lab Results   Component Value Date/Time    PROTIME 12.4 06/30/2022 10:01 AM    INR 1.2 06/30/2022 10:01 AM     PTT:    Lab Results   Component Value Date/Time    APTT 36.9 06/29/2022 01:41 PM       Comprehensive Metabolic Profile:   Recent Labs     06/29/22  1341 06/29/22  1821 06/30/22 0221 07/01/22  0553   *  --  134* 135   K 4.4  --  3.8 3.9   CL 95*  --  99 103   CO2 21  --  21 21   BUN 81*  --  70* 50*   CREATININE 2.91*  --  2.43* 1.99*   GLUCOSE 187*  --  169* 80   CALCIUM 8.7  --  8.5* 8.5*   PROT 5.6* 5.4* 5.1*  --    LABALBU 3.3* 3.3* 3.1*  --    BILITOT 0.19* 0.27* 0.40  --    ALKPHOS 74 67 68  --    AST 25 18 18  --    ALT 12 12 11  --       Magnesium:   Lab Results   Component Value Date/Time    MG 1.8 07/01/2022 05:53 AM    MG 2.0 06/30/2022 02:21 AM    MG 2.0 04/22/2022 02:37 PM     Phosphorus:   Lab Results   Component Value Date/Time    PHOS 3.1 07/01/2022 05:53 AM    PHOS 4.1 06/30/2022 02:21 AM     Ionized Calcium: No results found for: CAION     Urinalysis:   Lab Results   Component Value Date/Time    NITRU NEGATIVE 06/12/2022 11:34 AM    PHUR 6.5 06/12/2022 11:34 AM    WBCUA 0 TO 4 06/12/2022 11:34 AM    RBCUA None 06/12/2022 11:34 AM    BACTERIA None 06/12/2022 11:34 AM    SPECGRAV 1.005 06/12/2022 11:34 AM    LEUKOCYTESUR NEGATIVE 06/12/2022 11:34 AM    UROBILINOGEN Normal 06/12/2022 11:34 AM    BILIRUBINUR NEGATIVE 06/12/2022 11:34 AM    GLUCOSEU NEGATIVE 06/12/2022 11:34 AM    KETUA NEGATIVE 06/12/2022 11:34 AM       HgBA1c:  No results found for: LABA1C  TSH:  No results found for: TSH  Lactic Acid:   Lab Results   Component Value Date/Time    LACTJEFE 1.8 06/12/2022 09:48 AM      Troponin: No results for input(s): TROPONINI in the last 72 hours. Microbiology:        Other Labs:        Radiology/Imaging:      ASSESSMENT:     Patient Active Problem List    Diagnosis Date Noted    GI bleed 06/30/2022    Ischemic cardiomyopathy 06/30/2022    Paroxysmal atrial fibrillation ([facility] 06/30/2022    Atherosclerosis of native arteries of extremities with intermittent claudication, bilateral legs ([facility] 05/26/2022    Uncontrolled type 2 DM with peripheral circulatory disorder ([facility] 05/09/2022    PAD (peripheral artery disease) ([facility] 05/09/2022    Skin ulcer of right great toe, limited to breakdown of skin ([facility] 05/09/2022    CKD (chronic kidney disease) stage 4, GFR 15-29 ml/min ([facility] 04/29/2022    Type 2 diabetes mellitus with complication ([place] [id number].)     Tobacco abuse           PLAN:     WEAN PER PROTOCOL:  [] No   [] Yes  [] N/A    ICU PROPHYLAXIS:  Stress ulcer:  [] PPI Agent  [] [id number] [] Sucralfate  [] Other:  VTE:   [] Enoxaparin  [] Unfract. Heparin Subcut  [] EPC Cuffs    NUTRITION:  [] NPO [] Tube Feeding (Specify: ) [] TPN  [] PO    HOME MEDS RECONCILED: [] No  [] Yes    CONSULTATION NEEDED:  [] No  [] Yes    FAMILY UPDATED:    [] No  [] Yes    TRANSFER OUT OF ICU:   [] No  [] Yes        Plan:    1. Neurologic:   · Neuro intact  · Neuro checks per protocol  · Sedation:none  · Pain management: none  2. Cardiovascular:  · Hemodynamically stable  · HR 73  · MAPs 119/53  · MAP goal 65  3. Pulmonary:  · Maintain oxygen sats >92%  · Pulmonary toilet  · On room air  · CXR: reviewed  4. GI/Nutrition  · Diet resumed  · Off protonix   · No Bloody BM yesterday  · Received 4 units of PRBCs  · Hb is stable  · CT abdomen is negative  5. Renal/Fluid/Electrolyte  Fluids D/C'd  · I/O: In: 1428  · Out: 2295  · UOP:  good  · BUN/Cr 50/1.99  · Monitor electrolytes, replace PRN   6.  ID  Not indicated    7 Hematology  8.0    8. Endocrine  80 blood glucose    9. DVT Prophylaxis  held     Beata Dominguez MD  Internal Medicine PGY1  7/1/2022 7:35 AM     Attending Physician Statement  I have discussed the care of [facility], including pertinent history and exam findings with the resident. I have reviewed the key elements of all parts of the encounter with the resident. I have seen and examined the patient with the resident. I agree with the assessment and plan and status of the problem list as documented. I seen the patient during the round today, chart reviewed, other notes reviewed GI note seen. He is hemodynamically stable currently on midodrine no pressor. His hemoglobin is stable 8 today as compared to yesterday no further transfusion needed after initial transfusions. He had a CT scan of the abdomen done did not show any source of bleeding she was a small bilateral effusions. His creatinine is 1.99 today with history of chronic kidney disease. Since hemoglobin is stable with change hemoglobin hematocrit check every 12 hours. Patient is on chronic anticoagulation therapy GI is okay with anticoagulation we will start him on low-dose heparin drip today with hemoglobin hematocrit for  Resume aspirin as it is okay with GI. Continue with midodrine. Continue to hold diuretic for now and antihypertensive medications. Patient had a CT scan of the abdomen done creatinine is 1.99 including metoprolol and hydralazine. We will transfer him to medicine floor with medicine team and critical care team will sign off once on the floor      Discussed with ICU nursing staff, treatment and plan discussed. Total critical care time caring for this patient with life threatening, unstable organ failure, including direct patient contact, management of life support systems, review of data including imaging and labs, discussions with other team members and physicians at least 27  Min so far today, excluding procedures. Please note that this chart was generated using voice recognition Dragon dictation software. Although every effort was made to ensure the accuracy of this automated transcription, some errors in transcription may have occurred.      Heather Miller MD  7/1/2022 12:00 PM

## 2022-07-01 NOTE — PLAN OF CARE
Problem: Discharge Planning  Goal: Discharge to home or other facility with appropriate resources  7/1/2022 1055 by Ruel Jenkins RN  Outcome: Progressing  Flowsheets (Taken 7/1/2022 0800)  Discharge to home or other facility with appropriate resources: Identify barriers to discharge with patient and caregiver     Problem: Skin/Tissue Integrity  Goal: Absence of new skin breakdown  7/1/2022 1055 by Ruel Jenkins RN  Outcome: Progressing     Problem: Safety - Adult  Goal: Free from fall injury  7/1/2022 1055 by Ruel Jenkins RN  Outcome: Progressing     Problem: ABCDS Injury Assessment  Goal: Absence of physical injury  7/1/2022 1055 by Ruel Jenkins RN  Outcome: Progressing     Problem: Neurosensory - Adult  Goal: Achieves stable or improved neurological status  7/1/2022 1055 by Ruel Jenkins RN  Outcome: Progressing     Problem: Cardiovascular - Adult  Goal: Maintains optimal cardiac output and hemodynamic stability  7/1/2022 1055 by Ruel Jenkins RN  Outcome: Progressing     Problem: Cardiovascular - Adult  Goal: Absence of cardiac dysrhythmias or at baseline  7/1/2022 1055 by Ruel Jenkins RN  Outcome: Progressing     Problem: Respiratory - Adult  Goal: Achieves optimal ventilation and oxygenation  7/1/2022 1055 by Ruel Jenkins RN  Outcome: Progressing     Problem: Gastrointestinal - Adult  Goal: Minimal or absence of nausea and vomiting  7/1/2022 1055 by Ruel Jenkins RN  Outcome: Progressing     Problem: Gastrointestinal - Adult  Goal: Maintains or returns to baseline bowel function  7/1/2022 1055 by Ruel Jenkins RN  Outcome: Progressing     Problem: Gastrointestinal - Adult  Goal: Maintains adequate nutritional intake  7/1/2022 1055 by Ruel Jenkins RN  Outcome: Progressing     Problem: Genitourinary - Adult  Goal: Absence of urinary retention  7/1/2022 1055 by Ruel Jenkins RN  Outcome: Progressing     Problem: Metabolic/Fluid and Electrolytes - Adult  Goal: Electrolytes maintained within normal limits  7/1/2022 1055 by Erica Medina RN  Outcome: Progressing     Problem: Metabolic/Fluid and Electrolytes - Adult  Goal: Hemodynamic stability and optimal renal function maintained  7/1/2022 1055 by Erica Medina RN  Outcome: Progressing     Problem: Metabolic/Fluid and Electrolytes - Adult  Goal: Glucose maintained within prescribed range  7/1/2022 1055 by Erica Medina RN  Outcome: Progressing     Problem: Skin/Tissue Integrity - Adult  Goal: Skin integrity remains intact  7/1/2022 1055 by Erica Medina RN  Outcome: Progressing     Problem: Skin/Tissue Integrity - Adult  Goal: Incisions, wounds, or drain sites healing without S/S of infection  7/1/2022 1055 by Erica Medina RN  Outcome: Progressing     Problem: Skin/Tissue Integrity - Adult  Goal: Oral mucous membranes remain intact  7/1/2022 1055 by Erica Medina RN  Outcome: Progressing     Problem: Hematologic - Adult  Goal: Maintains hematologic stability  7/1/2022 1055 by Erica Medina RN  Outcome: Progressing     Problem: Musculoskeletal - Adult  Goal: Return mobility to safest level of function  7/1/2022 1055 by Erica Medina RN  Outcome: Progressing     Problem: Musculoskeletal - Adult  Goal: Maintain proper alignment of affected body part  7/1/2022 1055 by Erica Medina RN  Outcome: Progressing     Problem: Musculoskeletal - Adult  Goal: Return ADL status to a safe level of function  7/1/2022 1055 by Erica Medina RN  Outcome: Progressing     Problem: Chronic Conditions and Co-morbidities  Goal: Patient's chronic conditions and co-morbidity symptoms are monitored and maintained or improved  7/1/2022 1055 by Erica Medina RN  Outcome: Progressing  Flowsheets (Taken 7/1/2022 0800)  Care Plan - Patient's Chronic Conditions and Co-Morbidity Symptoms are Monitored and Maintained or Improved: Monitor and assess patient's chronic conditions and comorbid symptoms for stability, deterioration, or improvement

## 2022-07-01 NOTE — FLOWSHEET NOTE
SPIRITUAL CARE DEPARTMENT - [address]  PROGRESS NOTE    Shift date: 7/1/2022  Shift day: Friday   Shift # 1    Room # 0830/7351-49   Name: Alie Kat                [place]ondoism: Christiam   Place of Episcopal: Attends [employer] in [place] occasionally with wife. Referral: Routine Visit    Admit Date & Time: 6/29/2022  5:58 PM    Assessment:  Alie Kat is a 68 y.o. male. Upon entering the room writer observes wife sitting on sofa and sitter present. Patient appeared somewhat confused. He did engage in some conversation. Nurse stated that patient is to get moved to the 4th floor and pt expressed concern that his wife would not go with him. She assured him she would. Wife explained the health issues the patient has experience this year and said it's been a difficult year for them. Intervention:  Writer introduced self and title as  Writer offered space for pt and wife  to express feelings, needs, and concerns and provided a ministry presence.  assured them of prayers. Outcome:  Pt and wife expressed appreciation for visit. Plan:  Chaplains will remain available to offer spiritual and emotional support as needed.       Electronically signed by Wendi Silver on 7/1/2022 at 2:53 PM.  [facility]  [phone]       07/01/22 1452   Encounter Summary   Service Provided For: Patient and family together   Referral/Consult From: [facility]   [employer] Spouse   Last Encounter  07/01/22   Complexity of Encounter Low   Begin Time 1430   End Time  1440   Total Time Calculated 10 min   Encounter    Type Initial Screen/Assessment   Assessment/Intervention/Outcome   Assessment Calm;Coping   Intervention Active listening;Explored/Affirmed feelings, thoughts, concerns;Prayer (assurance of)/[place]   Outcome Engaged in conversation;Expressed feelings, needs, and concerns;Expressed Gratitude;Receptive

## 2022-07-01 NOTE — PROGRESS NOTES
[facility]enterology Progress Note    Harmeet Matthews is a 68 y.o. male patient. Hospitalization Day:2      Chief consult reason: Anemia. Subjective: Patient seen and examined. Patient awake, alert, no complaints of voiced. No reports of nausea , vomiting or melena overnight. Objective:   VITALS:  BP (!) 141/106   Pulse 72   Temp 97.8 °F (36.6 °C) (Oral)   Resp 19   Ht 5' 10\" (1.778 m)   Wt 184 lb 11.9 oz (83.8 kg)   SpO2 94%   BMI 26.51 kg/m²   TEMPERATURE:  Current - Temp: 97.8 °F (36.6 °C); Max - Temp  Av.9 °F (36.6 °C)  Min: 97.7 °F (36.5 °C)  Max: 98.3 °F (36.8 °C)    Physical Assessment:  General appearance:  alert, cooperative and no distress  Mental Status:  oriented to person, follows commands.   No acute distress  Lungs: Equal expansion  Heart:  regular rate and rhythm, no murmur  Abdomen:  soft, nontender, nondistended,  Extremities:  no edema, no redness, No clubbing  Skin:  warm, dry, no gross lesions or rashes    CURRENT MEDICATIONS:  Scheduled Meds:   amiodarone  100 mg Oral Daily    pantoprazole (PROTONIX) 40 mg injection  40 mg IntraVENous Daily    midodrine  10 mg Oral TID WC    atorvastatin  40 mg Oral Nightly    [Held by provider] hydrALAZINE  10 mg Oral TID    QUEtiapine  25 mg Oral Nightly    sodium chloride flush  5-40 mL IntraVENous 2 times per day     Continuous Infusions:   sodium chloride       PRN Meds:sodium chloride flush, sodium chloride, ondansetron **OR** ondansetron, polyethylene glycol, acetaminophen **OR** acetaminophen, albuterol      Data Review:  LABS and IMAGING:     CBC  Recent Labs     22  1341 22  2022 22  0221 22  0914 22  1831 22  2211 22  0553   WBC 8.0  --  7.4  --   --   --  8.4   HGB 4.9*   < > 6.8* 8.1* 8.5* 8.4* 8.0*   HCT 15.7*   < > 22.0* 25.6* 27.4* 25.9* 25.4*   MCV 83.5  --  84.9  --   --   --  87.6   MCHC 31.2  --  30.9  --   --   --  31.5   RDW 18.2*  --  17.6*  --   -- --  17.7*     --  230  --   --   --  228    < > = values in this interval not displayed. Immature PLTs   No results found for: PLTFLUORE    ANEMIA STUDIES  No results for input(s): LABIRON, TIBC, IRON, FERRITIN, [id number], FOLATE, OCCULTBLD in the last 72 hours. BMP  Recent Labs     06/29/22  1341 06/30/22  0221 07/01/22  0553   * 134* 135   K 4.4 3.8 3.9   CL 95* 99 103   CO2 21 21 21   BUN 81* 70* 50*   CREATININE 2.91* 2.43* 1.99*   GLUCOSE 187* 169* 80   CALCIUM 8.7 8.5* 8.5*       LFTS  Recent Labs     06/29/22  1341 06/29/22  1821 06/30/22  0221   ALKPHOS 74 67 68   ALT 12 12 11   AST 25 18 18   BILITOT 0.19* 0.27* 0.40   BILIDIR  --  0.09 0.13   LABALBU 3.3* 3.3* 3.1*       AMYLASE/LIPASE/AMMONIA  No results for input(s): AMYLASE, LIPASE, AMMONIA in the last 72 hours. Acute Hepatitis Panel   No results found for: HEPBSAG, HEPCAB, HEPBIGM, HEPAIGM    HCV Genotype   No results found for: HEPATITISCGENOTYPE    HCV Quantitative   No results found for: HCVQNT      PT/INR  Recent Labs     06/29/22  1341 06/30/22  1001   PROTIME 21.0* 12.4*   INR 1.9 1.2       Cancer Markers:  CEA:  No results found for: CEA  Ca 125:  No results found for:   Ca 19-9:   No results found for:   AFP: No results found for: AFP    Lactic acid:No results for input(s): LACTACIDWB in the last 72 hours. Radiology Review:    CT ABDOMEN PELVIS WO CONTRAST Additional Contrast? None    Result Date: 6/30/2022  EXAMINATION: CT OF THE ABDOMEN AND PELVIS WITHOUT CONTRAST 6/30/2022 4:59 pm TECHNIQUE: CT of the abdomen and pelvis was performed without the administration of intravenous contrast. Multiplanar reformatted images are provided for review. Automated exposure control, iterative reconstruction, and/or weight based adjustment of the mA/kV was utilized to reduce the radiation dose to as low as reasonably achievable. COMPARISON: None.  HISTORY: ORDERING SYSTEM PROVIDED HISTORY: gi bleed TECHNOLOGIST PROVIDED HISTORY: gi bleed Reason for Exam: gi bleed FINDINGS: Lower Chest: Small right and moderate left pleural effusions. Mild bibasilar atelectasis. Mild cardiomegaly. Organs: Lack of intravenous contrast limits evaluation of the solid organs, vascular structures, and bowel. The liver is unremarkable. Sludge and stones are seen in the gallbladder without evidence of acute cholecystitis or biliary ductal dilatation. The pancreas, spleen, and bilateral adrenal glands are unremarkable. The bilateral kidneys and ureters are unremarkable. GI/Bowel: The colon is unremarkable. No evidence of acute appendicitis. The stomach and small bowel are unremarkable. No obstruction or wall thickening identified. Pelvis: The urinary bladder is normal in appearance. The prostate gland is unremarkable. No free fluid in the pelvis. No pelvic or inguinal lymphadenopathy. Peritoneum/Retroperitoneum: The abdominal aorta is normal in caliber with severe calcific plaquing. No retroperitoneal or mesenteric lymphadenopathy is identified. No free air or fluid is seen in the abdomen. Bones/Soft Tissues: No acute osseous or soft tissue abnormality. 1. No acute abnormality in the abdomen or pelvis. No explanation for the patient's gastrointestinal hemorrhage. 2. Cholelithiasis. 3. Small right and moderate left pleural effusions with mild bibasilar atelectasis. XR FOOT LEFT (MIN 3 VIEWS)    Result Date: 6/29/2022  EXAMINATION: THREE XRAY VIEWS OF THE RIGHT FOOT; THREE XRAY VIEWS OF THE LEFT FOOT 6/29/2022 4:02 pm COMPARISON: None. HISTORY: ORDERING SYSTEM PROVIDED HISTORY: diabetic foot ulcer TECHNOLOGIST PROVIDED HISTORY: diabetic foot ulcer Reason for Exam: Diabetic ulcers, pain FINDINGS: The visualized bones are normal.  There is no evidence of fracture or dislocation. Hammertoe deformities. The remaining joint spaces appear well maintained. The soft tissues are unremarkable.   Vascular calcifications are seen compatible with atherosclerotic disease. Calcaneal spurs     No acute bony abnormalities are noted     XR FOOT RIGHT (MIN 3 VIEWS)    Result Date: 6/29/2022  EXAMINATION: THREE XRAY VIEWS OF THE RIGHT FOOT; THREE XRAY VIEWS OF THE LEFT FOOT 6/29/2022 4:02 pm COMPARISON: None. HISTORY: ORDERING SYSTEM PROVIDED HISTORY: diabetic foot ulcer TECHNOLOGIST PROVIDED HISTORY: diabetic foot ulcer Reason for Exam: Diabetic ulcers, pain FINDINGS: The visualized bones are normal.  There is no evidence of fracture or dislocation. Hammertoe deformities. The remaining joint spaces appear well maintained. The soft tissues are unremarkable. Vascular calcifications are seen compatible with atherosclerotic disease. Calcaneal spurs     No acute bony abnormalities are noted     XR CHEST PORTABLE    Result Date: 6/29/2022  EXAMINATION: ONE XRAY VIEW OF THE CHEST 6/29/2022 1:44 pm COMPARISON: June 22, 2022 chest exam HISTORY: ORDERING SYSTEM PROVIDED HISTORY: shortness of breath TECHNOLOGIST PROVIDED HISTORY: shortness of breath FINDINGS: Median sternotomy/stable cardiomegaly Limited assessment of the left lung base, left retrocardiac density is suspected. Otherwise clear lungs     Limited left basilar assessment, left retrocardiac density is suspected which may be related to a pleural and/or parenchymal process. Upright PA and lateral views of the chest are suggested for more accurate assessment of the left lung base     XR CHEST PORTABLE    Result Date: 6/22/2022  EXAMINATION: ONE XRAY VIEW OF THE CHEST 6/22/2022 1:32 pm COMPARISON: None. HISTORY: ORDERING SYSTEM PROVIDED HISTORY: shortness of breath TECHNOLOGIST PROVIDED HISTORY: shortness of breath Reason for Exam: SOB FINDINGS: No acute airspace infiltrate. No pneumothorax or pleural effusion. Mild cardiomegaly. Status post median sternotomy. No acute cardiopulmonary findings.   Mild cardiomegaly       ENDOSCOPY     Date of Procedure: 6/30/2022     Pre-Op Diagnosis: and internal hemorrhoids.  -Likelihood of patient's anemia being contributed to GI bleed is low  2. Complex cardiac history with ischemic cardiomyopathy with EF of 20 to 25%, CAD s/p CABG and atrial fibrillation      Plan of care:   1. Monitor hemoglobin and hematocrit. Transfuse to keep hemoglobin greater than 8 g/dL (cardiac history) or as clinically indicated  2. Consider hematological anemia work-up  3. No further endoscopy work-up planned at this time from GI perspective. With patient's significant cardiac history, repeating colonoscopy would be high risk and likely low yield  4. Okay to resume anticoagulation and antiplatelet therapy from GI   5. If patient has ongoing or overt signs of GI bleeding, recommend obtaining nuclear medicine bleeding scan  6. GI will sign off      Time spent reviewing chart, seeing patient, and discussing with attending: Around 30 minutes    This plan was formulated in collaboration with Dr. Smith Salazar  Please feel free to contact me with any questions or concerns. Thank you for allowing me to participate in the care of your patient. Annamaria Durand, APRN - CNP on 7/1/2022 at 10:05 AM   THE [facility] Gastroenterology    Please note that this note was generated using a voice recognition dictation software. Although every effort was made to ensure the accuracy of this automated transcription, some errors in transcription may have occurred.

## 2022-07-01 NOTE — PROGRESS NOTES
Critical care team - Resident sign-out to medicine service      Date and time: 7/1/2022 1:39 PM  Patient's name:  Michael Garvey Record Number: [id number]  Patient's account/billing number: [de-identified]  Patient's YOB: 1945  Age: 68 y.o. Date of Admission: 6/29/2022  5:58 PM  Length of stay during current admission: 2    Primary Care Physician: Monae Martin MD    Code Status: Full Code    Mode of physician to physician communication:        [x] Via telephone   [] In person     Date and time of sign-out: 7/1/2022 3:04 PM        Accepting Medicine team: IM Team intermed     Accepting team's attending: Dr. Aury Pillai     Patient's current ICU Bed: [phone]    Patient's assigned bed on floor:  426      [] Med-Surg Monitored [x] Step-down       [] Psychiatry ICU       [] Psych floor     Reason for ICU admission:   Anemia /hypotension    ICU course summary:   Patient presented to [facility] emergency department after home health care nurse found him to be hypotensive and having GI bleed. Hemoglobin at [place] 4.9 and received 2 PRBC. Patient received 2 unit PRBC at [place]. GI was consulted . EGD done showing small hiatal hernia without any obvious source of bleeding. GI cleared for anticoagulation and antiplatelets today. Recommending nuclear medicine scan in case of overt bleeding. Patient also got admitted in April 2022 at at [facility], colonoscopy done showing polyps and internal hemorrhoids  Cardiology consulted in the setting of complex cardiac cardiac history with ischemic cardiomyopathy EF 20-25, CAD CABG, A. Fib,ckd stage 3 . Cardiology recommends outpatient follow up for possible   Patient hemodynamically stable, saturating normally on room air. Serum electrolyte unremarkable. Hemoglobin 8.2.   Procedures during patient's ICU stay:   EGD  Current Vitals:   BP (!) 109/43   Pulse 67   Temp 97.8 °F (36.6 °C) (Axillary)   Resp 19   Ht 5' 10\" (1.778 m)   Wt 184 lb 11.9 oz (83.8 kg)   SpO2 92%   BMI 26.51 kg/m²     Cultures:     Blood cultures:                 [] None drawn      [] Negative             []  Positive (Details:  )  Urine Culture:                   [] None drawn      [] Negative             []  Positive (Details:  )  Sputum Culture:               [] None drawn       [] Negative             []  Positive (Details:  )   Endotracheal aspirate:     [] None drawn       [] Negative             []  Positive (Details:  )       Consults:     1.GI  2.CARDIO    Assessment:     Patient Active Problem List    Diagnosis Date Noted    Acute posthemorrhagic anemia     Current use of long term anticoagulation     Normocytic normochromic anemia     GI bleed 06/30/2022    Ischemic cardiomyopathy 06/30/2022    Paroxysmal atrial fibrillation ([facility] [address] 06/30/2022    Atherosclerosis of native arteries of extremities with intermittent claudication, bilateral legs ([facility] [address] 05/26/2022    Uncontrolled type 2 DM with peripheral circulatory disorder ([facility] [address] 05/09/2022    PAD (peripheral artery disease) ([facility] [address] 05/09/2022    Skin ulcer of right great toe, limited to breakdown of skin ([facility] [address] 05/09/2022    CKD (chronic kidney disease) stage 4, GFR 15-29 ml/min ([facility] [address] 04/29/2022    Type 2 diabetes mellitus with complication ([facility] [address]     Tobacco abuse        Additional assessment:  1. Acute on chronic anemia without any source being identified at this point. Chronicity of anemia likely related to kidney disease, EGD unremarkable and CT abdomen does not show any source of bleeding. 2. CAD s/p CABG x2 in 2/22  3. ICM EF 20-25% on LifeVest  4. PAF on eliquis at home  5. T2DM  6. HTN  7. PAD  8. CKD  9. HLD  Recommended Follow-up:   1. Continue amiodarone 200 mg daily  2. Started on heparin infusion and monitor H & H and transfuse if HB is low and stop heparin drip  3. If HB remained stable for 24 hours then can be switched to oral anticoagulants   4. Continue Midodrine for pressure support  5.  On hold hydralazine due to low blood pressure      Above mentioned assessment and plan was discussed by me with the admitting medicine resident. The medicine team assigned to the patient by medicine admitting resident will be following up the patient from now onwards on the floor.      Electronically signed by Gricel Bear MD on 7/1/2022 at 3:23 PM

## 2022-07-02 VITALS
HEART RATE: 67 BPM | TEMPERATURE: 98.1 F | HEIGHT: 70 IN | OXYGEN SATURATION: 98 % | DIASTOLIC BLOOD PRESSURE: 52 MMHG | BODY MASS INDEX: 26.45 KG/M2 | WEIGHT: 184.75 LBS | RESPIRATION RATE: 16 BRPM | SYSTOLIC BLOOD PRESSURE: 120 MMHG

## 2022-07-02 LAB
ABSOLUTE EOS #: 0.29 K/UL (ref 0–0.44)
ABSOLUTE IMMATURE GRANULOCYTE: 0.03 K/UL (ref 0–0.3)
ABSOLUTE LYMPH #: 1.38 K/UL (ref 1.1–3.7)
ABSOLUTE MONO #: 1.03 K/UL (ref 0.1–1.2)
ANION GAP SERPL CALCULATED.3IONS-SCNC: 11 MMOL/L (ref 9–17)
BASOPHILS # BLD: 1 % (ref 0–2)
BASOPHILS ABSOLUTE: 0.04 K/UL (ref 0–0.2)
BUN BLDV-MCNC: 37 MG/DL (ref 8–23)
CALCIUM SERPL-MCNC: 8.3 MG/DL (ref 8.6–10.4)
CHLORIDE BLD-SCNC: 103 MMOL/L (ref 98–107)
CO2: 21 MMOL/L (ref 20–31)
CREAT SERPL-MCNC: 1.91 MG/DL (ref 0.7–1.2)
EOSINOPHILS RELATIVE PERCENT: 4 % (ref 1–4)
GFR AFRICAN AMERICAN: 42 ML/MIN
GFR NON-AFRICAN AMERICAN: 34 ML/MIN
GFR SERPL CREATININE-BSD FRML MDRD: ABNORMAL ML/MIN/{1.73_M2}
GLUCOSE BLD-MCNC: 82 MG/DL (ref 70–99)
HCT VFR BLD CALC: 25.7 % (ref 40.7–50.3)
HCT VFR BLD CALC: 26.4 % (ref 40.7–50.3)
HEMOGLOBIN: 7.8 G/DL (ref 13–17)
HEMOGLOBIN: 8.1 G/DL (ref 13–17)
IMMATURE GRANULOCYTES: 0 %
LYMPHOCYTES # BLD: 19 % (ref 24–43)
MAGNESIUM: 1.8 MG/DL (ref 1.6–2.6)
MCH RBC QN AUTO: 26.8 PG (ref 25.2–33.5)
MCHC RBC AUTO-ENTMCNC: 30.7 G/DL (ref 28.4–34.8)
MCV RBC AUTO: 87.4 FL (ref 82.6–102.9)
MONOCYTES # BLD: 14 % (ref 3–12)
NRBC AUTOMATED: 0 PER 100 WBC
PARTIAL THROMBOPLASTIN TIME: 47.6 SEC (ref 20.5–30.5)
PARTIAL THROMBOPLASTIN TIME: 72.2 SEC (ref 20.5–30.5)
PDW BLD-RTO: 18.1 % (ref 11.8–14.4)
PHOSPHORUS: 3.1 MG/DL (ref 2.5–4.5)
PLATELET # BLD: 237 K/UL (ref 138–453)
PMV BLD AUTO: 9.9 FL (ref 8.1–13.5)
POTASSIUM SERPL-SCNC: 3.8 MMOL/L (ref 3.7–5.3)
RBC # BLD: 3.02 M/UL (ref 4.21–5.77)
RBC # BLD: ABNORMAL 10*6/UL
SEG NEUTROPHILS: 62 % (ref 36–65)
SEGMENTED NEUTROPHILS ABSOLUTE COUNT: 4.62 K/UL (ref 1.5–8.1)
SODIUM BLD-SCNC: 135 MMOL/L (ref 135–144)
WBC # BLD: 7.4 K/UL (ref 3.5–11.3)

## 2022-07-02 PROCEDURE — 80048 BASIC METABOLIC PNL TOTAL CA: CPT

## 2022-07-02 PROCEDURE — 99232 SBSQ HOSP IP/OBS MODERATE 35: CPT | Performed by: STUDENT IN AN ORGANIZED HEALTH CARE EDUCATION/TRAINING PROGRAM

## 2022-07-02 PROCEDURE — 94761 N-INVAS EAR/PLS OXIMETRY MLT: CPT

## 2022-07-02 PROCEDURE — 83735 ASSAY OF MAGNESIUM: CPT

## 2022-07-02 PROCEDURE — 6370000000 HC RX 637 (ALT 250 FOR IP): Performed by: STUDENT IN AN ORGANIZED HEALTH CARE EDUCATION/TRAINING PROGRAM

## 2022-07-02 PROCEDURE — 85018 HEMOGLOBIN: CPT

## 2022-07-02 PROCEDURE — 84100 ASSAY OF PHOSPHORUS: CPT

## 2022-07-02 PROCEDURE — 85025 COMPLETE CBC W/AUTO DIFF WBC: CPT

## 2022-07-02 PROCEDURE — 2580000003 HC RX 258: Performed by: STUDENT IN AN ORGANIZED HEALTH CARE EDUCATION/TRAINING PROGRAM

## 2022-07-02 PROCEDURE — 99238 HOSP IP/OBS DSCHRG MGMT 30/<: CPT | Performed by: STUDENT IN AN ORGANIZED HEALTH CARE EDUCATION/TRAINING PROGRAM

## 2022-07-02 PROCEDURE — 85014 HEMATOCRIT: CPT

## 2022-07-02 PROCEDURE — 85730 THROMBOPLASTIN TIME PARTIAL: CPT

## 2022-07-02 PROCEDURE — 36415 COLL VENOUS BLD VENIPUNCTURE: CPT

## 2022-07-02 RX ORDER — PANTOPRAZOLE SODIUM 40 MG/1
40 TABLET, DELAYED RELEASE ORAL
Qty: 30 TABLET | Refills: 3 | Status: SHIPPED | OUTPATIENT
Start: 2022-07-03

## 2022-07-02 RX ADMIN — ASPIRIN 81 MG: 81 TABLET, COATED ORAL at 08:26

## 2022-07-02 RX ADMIN — MIDODRINE HYDROCHLORIDE 10 MG: 5 TABLET ORAL at 08:26

## 2022-07-02 RX ADMIN — PANTOPRAZOLE SODIUM 40 MG: 40 TABLET, DELAYED RELEASE ORAL at 08:26

## 2022-07-02 RX ADMIN — AMIODARONE HYDROCHLORIDE 100 MG: 200 TABLET ORAL at 08:26

## 2022-07-02 RX ADMIN — SODIUM CHLORIDE, PRESERVATIVE FREE 5 ML: 5 INJECTION INTRAVENOUS at 08:27

## 2022-07-02 NOTE — PROGRESS NOTES
[facility]  Office: [phone]  Anuprina Jones, DO, Priya Benitez, DO, Janeen Romero, DO, Brian Yoon, DO, Reba Penn MD, Venkat Stanley MD, Dee Lopez MD, Selena Nguyen MD, Sahara Goel MD, Ángel Spence MD, Mayra Crouch MD, Chris Hager, DO, Ede Garcia MD,  Shari Beasley, DO, Ally Coker MD, Vinod Swain MD, Gilbert Daniels DO, Toshia Garcia MD, Jasper Yee MD, Sundeep Naidu, DO, Rafael Bravo MD, Nany Antoine MD, [facility], [facility], CNP, Marcos Jenkins, CNP, Brian Toledo, CNP, Shreya Maynard, CNP, Singh Metcalf, CNP, Cassandra Anthony PA-C, Paige Hart, [facility], Carri Cornejo, CNP, Juana Badillo, CNP, Mercedes Joaquin, CNP, Irma Gregg, CNS, Sage Silva, [facility], Jacklyn Adorno, CNP, Bo Patel, CNP, Marylu Myers, [address] [place] [address]    Progress Note    7/2/2022    10:35 AM    Name:   Ej Bravo  MRN:     [id number]     Acct:      [de-identified]   Room:   [address] [place] [address] Day:  3  Admit Date:  6/29/2022  5:58 PM    PCP:   Rebecca Mckoy MD  Code Status:  Full Code    Subjective:     C/C:   Chief Complaint   Patient presents with   [place] Other     GI bleed ( [place] [facility] )     Interval History Status: improved. Doing much better  Sitting upright during time of examination eating breakfast  Denies chest pain shortness of breath and no evidence of bleeding diatheses  This morning hemoglobin 7.8 previous 8.1 previous 8.2 previous 8.2 previous a previous 8  He is also hemodynamically stable and suitable for discharge she lives at home he is not requesting a home health aide    Brief History:      This is a 66-year-old male with ischemic cardiomyopathy ejection fraction around 30% who presented with an acute drop in hemoglobin felt to be acute blood loss anemia he was evaluated initially in the ICU after being transferred from an outside facility specifically [place] [facility]    In their ER he was hypotensive with a purported blood pressure of 80/30 and a documented lower GI bleed. His hemoglobin at that facility was 4.9 he received 2 units packed red blood cells at their facility prior to transfer    His INR was also 1.9 and he was azotemic owing to acute blood loss    Arrival to our department he was normotensive and he received 2 more units of PRBCs    He is status post CABG she has a history of A. fib for which she is on Eliquis 5 mg twice daily and aspirin    He is also CKD stage IIIa    He has a LifeVest due to his cardiomyopathy    He did have a previous admission at [facility] in April and underwent colonoscopy during that hospitalization with hot snare polypectomy he also was noted to have internal hemorrhoids    He was transferred out of the ICU yesterday he has had a stable hemoglobin for 48 hours. The initial thought was to put him on a reduced dose of Eliquis, however cardiology recommends that Eliquis be can discontinued completely    He meets at least 1 the criteria for reduced dose given his creatinine is greater than 1.5 however he is not [de-identified]years old and he is not under 60 kg    Review of Systems:     Constitutional:  negative for chills, fevers, sweats  Respiratory:  negative for cough, dyspnea on exertion, shortness of breath, wheezing  Cardiovascular:  negative for chest pain, chest pressure/discomfort, lower extremity edema, palpitations  Gastrointestinal:  negative for abdominal pain, constipation, diarrhea, nausea, vomiting  Neurological:  negative for dizziness, headache    Medications: Allergies:     Allergies   Allergen Reactions    Simvastatin Headaches       Current Meds:   Scheduled Meds:    pantoprazole  40 mg Oral QAM AC    aspirin  81 mg Oral Daily    amiodarone  100 mg Oral Daily    midodrine  10 mg Oral TID WC    atorvastatin  40 mg Oral Nightly    [Held by provider] hydrALAZINE  10 mg Oral TID    QUEtiapine  25 mg Oral Nightly    sodium chloride flush  5-40 mL IntraVENous 2 times per day     Continuous Infusions:    sodium chloride       PRN Meds: sodium chloride flush, sodium chloride, ondansetron **OR** ondansetron, polyethylene glycol, acetaminophen **OR** acetaminophen, albuterol    Data:     Past Medical History:   has a past medical history of Cerebrovascular accident (CVA) due to embolism of cerebral artery ([facility] [address] CKD (chronic kidney disease) stage 4, GFR 15-29 ml/min ([facility] [address] Coronary artery disease involving coronary bypass graft of native heart without angina pectoris, H/O lower gastrointestinal bleeding, Tobacco abuse, and Type II or unspecified type diabetes mellitus without mention of complication, not stated as uncontrolled. Social History:   reports that he quit smoking about 4 months ago. His smoking use included cigarettes. He smoked 1.50 packs per day. He has never used smokeless tobacco. He reports that he does not drink alcohol and does not use drugs. Family History: No family history on file. Vitals:  BP (!) 121/56   Pulse 81   Temp 97.8 °F (36.6 °C) (Oral)   Resp 19   Ht 5' 10\" (1.778 m)   Wt 184 lb 11.9 oz (83.8 kg)   SpO2 97%   BMI 26.51 kg/m²   Temp (24hrs), Av.2 °F (36.8 °C), Min:97.8 °F (36.6 °C), Max:98.6 °F (37 °C)    No results for input(s): POCGLU in the last 72 hours. I/O (24Hr):     Intake/Output Summary (Last 24 hours) at 2022 1035  Last data filed at 2022 0300  Gross per 24 hour   Intake 149.69 ml   Output 1450 ml   Net -1300.31 ml       Labs:  Hematology:  Recent Labs     22  1341 22  0221 22  0914 22  1001 22  1831 22  0553 22  1055 22  2228 22  0400 22  0935   WBC 8.0  --  7.4  --   --   --  8.4  --   --  7.4  --    RBC 1.88*  --  2.59*  --   --   --  2.90*  --   --  3.02*  --    HGB 4.9*   < > 6.8*   < >  --    < > 8.0*   < > 8.2* 8.1* 7.8*   HCT 15.7*   < > 22.0*   < >  --    < > 25.4*   < > 25.9* 26.4* 25.7*   MCV 83.5  --  84.9  --   --   --  87.6  --   --  87.4  --    MCH 26.1  --  26.3  --   --   --  27.6  --   --  26.8  --    MCHC 31.2  --  30.9  --   --   --  31.5  --   --  30.7  --    RDW 18.2*  --  17.6*  --   --   --  17.7*  --   --  18.1*  --      --  230  --   --   --  228  --   --  237  --    MPV 10.2  --  10.7  --   --   --  10.5  --   --  9.9  --    INR 1.9  --   --   --  1.2  --   --   --   --   --   --     < > = values in this interval not displayed. Chemistry:  Recent Labs     06/29/22  1341 06/29/22  1341 06/29/22  1821 06/30/22  0221 07/01/22  0553 07/02/22  0400   *   < >  --  134* 135 135   K 4.4   < >  --  3.8 3.9 3.8   CL 95*   < >  --  99 103 103   CO2 21   < >  --  21 21 21   GLUCOSE 187*   < >  --  169* 80 82   BUN 81*   < >  --  70* 50* 37*   CREATININE 2.91*   < >  --  2.43* 1.99* 1.91*   MG  --   --   --  2.0 1.8 1.8   ANIONGAP 14   < >  --  14 11 11   LABGLOM 21*   < >  --  26* 33* 34*   GFRAA 26*   < >  --  32* 40* 42*   CALCIUM 8.7   < >  --  8.5* 8.5* 8.3*   PHOS  --   --   --  4.1 3.1 3.1   PROBNP 4,184*  --   --   --   --   --    TROPHS 97*  --  90*  --   --   --     < > = values in this interval not displayed. Recent Labs     06/29/22  1341 06/29/22  1821 06/30/22  0221   PROT 5.6* 5.4* 5.1*   LABALBU 3.3* 3.3* 3.1*   AST 25 18 18   ALT 12 12 11   ALKPHOS 74 67 68   BILITOT 0.19* 0.27* 0.40   BILIDIR  --  0.09 0.13     ABG:No results found for: POCPH, PHART, PH, POCPCO2, WEA2KNW, PCO2, POCPO2, PO2ART, PO2, POCHCO3, ZDH3YOW, HCO3, NBEA, PBEA, BEART, BE, THGBART, THB, DYC8UQX, YRVR4MWQ, V6OIPTGP, O2SAT, FIO2  No results found for: SPECIAL  Lab Results   Component Value Date/Time    CULTURE NO GROWTH 2 DAYS 06/29/2022 01:51 PM       Radiology:  CT ABDOMEN PELVIS WO CONTRAST Additional Contrast? None    Result Date: 6/30/2022  1. No acute abnormality in the abdomen or pelvis. No explanation for the patient's gastrointestinal hemorrhage. 2. Cholelithiasis. 3. Small right and moderate left pleural effusions with mild bibasilar atelectasis. XR FOOT LEFT (MIN 3 VIEWS)    Result Date: 6/29/2022  No acute bony abnormalities are noted     XR FOOT RIGHT (MIN 3 VIEWS)    Result Date: 6/29/2022  No acute bony abnormalities are noted     XR CHEST PORTABLE    Result Date: 6/29/2022  Limited left basilar assessment, left retrocardiac density is suspected which may be related to a pleural and/or parenchymal process. Upright PA and lateral views of the chest are suggested for more accurate assessment of the left lung base       Physical Examination:        General appearance:  alert, cooperative and no distress  Mental Status:  oriented to person, place and time and normal affect  Lungs: Lungs clear auscultation bilaterally is wearing a LifeVest  Heart:  regular rate and rhythm, no murmur  Abdomen:  soft, nontender, nondistended, normal bowel sounds, no masses, hepatomegaly, splenomegaly  Extremities:  no edema, redness, tenderness in the calves  Skin: Patient has pale complexion    Assessment:        Hospital Problems           Last Modified POA    * (Principal) GI bleed 6/30/2022 Yes    CKD (chronic kidney disease) stage 4, GFR 15-29 ml/min ([address] 6/30/2022 Yes    PAD (peripheral artery disease) ([address] 6/30/2022 Yes    Skin ulcer of right great toe, limited to breakdown of skin ([address] 6/30/2022 Yes    Ischemic cardiomyopathy 6/30/2022 Yes    Paroxysmal atrial fibrillation ([address] 6/30/2022 Yes    Acute posthemorrhagic anemia 7/1/2022 Yes    Current use of long term anticoagulation 7/1/2022 Yes    Normocytic normochromic anemia 7/1/2022 Yes    Type 2 diabetes mellitus with complication ([address] 5/67/6622 Yes                Plan:        1. DC today  2. Discontinue Eliquis  3. Monitor for signs of bleeding diatheses at home  4. Follow-up with PCP  5. Take Protonix 40 mg daily  6.  Make sure you follow-up with nephrology given worsening renal function this admission      Maxim MD Jazmin  7/2/2022  10:35 AM

## 2022-07-02 NOTE — PROGRESS NOTES
Patient discharged to home via wc with writer to front door. DC instructions with patient. Wife and  understand meds.

## 2022-07-03 LAB
EKG ATRIAL RATE: 65 BPM
EKG P AXIS: -26 DEGREES
EKG P-R INTERVAL: 134 MS
EKG Q-T INTERVAL: 450 MS
EKG QRS DURATION: 158 MS
EKG QTC CALCULATION (BAZETT): 468 MS
EKG R AXIS: 48 DEGREES
EKG T AXIS: 104 DEGREES
EKG VENTRICULAR RATE: 65 BPM

## 2022-07-03 PROCEDURE — 93010 ELECTROCARDIOGRAM REPORT: CPT | Performed by: INTERNAL MEDICINE

## 2022-07-03 NOTE — PROGRESS NOTES
CLINICAL PHARMACY NOTE: MEDS TO BEDS    Total # of Prescriptions Filled: 1   The following medications were delivered to the patient:  · pantoprazole    Additional Documentation:  Pt paid with card on [employer] (92.34)

## 2022-07-03 NOTE — DISCHARGE SUMMARY
[facility]  Office: [address], DO, Maci Gallegos, DO, Sonia Patterson, DO, Isabel Edwards Car, DO, Hailey Acosta MD, Matt Sanchez MD, Iris Ramirez MD, Cristofer Aguilar MD, Kar Hadley MD, Promise Benitez MD, Eze Elias MD, Kika Mcqueen, DO, Yola Sue MD,  Dariela Grace DO, Petey Pedro MD, Amanda Sanford MD, Zbigniew Valencia, DO, Gonsalo Garcia MD, Grant Cutler MD, Sarah Anderson, DO, Tawnya Yi MD, Dietrich Halsted, MD, Mira Andersen [facility], [address], [facility], Spring Araiza, CNP, Jeb Power, CNP, Toni Hernandes, CNP, Analia Fink, CNP, Andrew Iqbal, PA-C, Enoch Abrams, [place], Ricardo Rosario, CNP, Mikaela Drew, CNP, Margaux Maloney, CNP, Paulo Villareal, CNS, Lita Baron, [place], Monse Reed, CNP, Leigha Hernandez, [facility], Nitza Boyd, [facility]    Discharge Summary     Patient ID: Bigg Flynn  :  1945   MRN: [id number]     ACCOUNT:  [de-identified]   Patient's PCP: Lourdes Penn MD  Admit Date: 2022   Discharge Date: 22  Length of Stay: 3  Code Status:  Prior  Admitting Physician: No admitting provider for patient encounter. Discharge Physician: Grant Cutler MD     Active Discharge Diagnoses:     Hospital Problem Lists:  Principal Problem:    GI bleed  Active Problems:    CKD (chronic kidney disease) stage 4, GFR 15-29 ml/min (HCC)    PAD (peripheral artery disease) (HCC)    Skin ulcer of right great toe, limited to breakdown of skin (HCC)    Ischemic cardiomyopathy    Paroxysmal atrial fibrillation (HCC)    Acute posthemorrhagic anemia    Current use of long term anticoagulation    Normocytic normochromic anemia    Type 2 diabetes mellitus with complication ([address]  Resolved Problems:    * No resolved hospital problems.  *      Admission Condition:  fair     Discharged Condition: good    Hospital Stay:     Hospital Course:  Bigg Flynn is a 68 y.o. male who was admitted for the management of   GI bleed , presented to ER with Other (GI bleed ( [place] ))      Patient initially admitted to ICU for critically low hemoglobin as he was transferred from outside facility with a hemoglobin of 4.9  He received 2 units while at [facility] ED and then was transferred to our hospital where he received 2 subsequent units  Hemoglobin remained stable for 3 days and he was transferred out of the ICU onto a general floor  GI did not see a compelling reason to scope patient, and outpatient colonoscopy was deferred  He was hemodynamically stable  He was previously on Eliquis for atrial fibrillation but this was felt to be too high risk so it was discontinued at the request of the cardiology service  He was sent home with home care and a LifeVest follow-up close with CV    Significant therapeutic interventions:     Significant Diagnostic Studies:   Labs / Micro:  BMP:    Lab Results   Component Value Date/Time    GLUCOSE 82 07/02/2022 04:00 AM     07/02/2022 04:00 AM    K 3.8 07/02/2022 04:00 AM     07/02/2022 04:00 AM    CO2 21 07/02/2022 04:00 AM    ANIONGAP 11 07/02/2022 04:00 AM    BUN 37 07/02/2022 04:00 AM    CREATININE 1.91 07/02/2022 04:00 AM    BUNCRER 28 06/29/2022 01:41 PM    CALCIUM 8.3 07/02/2022 04:00 AM    LABGLOM 34 07/02/2022 04:00 AM    GFRAA 42 07/02/2022 04:00 AM    GFR      07/02/2022 04:00 AM        Radiology:  CT ABDOMEN PELVIS WO CONTRAST Additional Contrast? None    Result Date: 6/30/2022  1. No acute abnormality in the abdomen or pelvis. No explanation for the patient's gastrointestinal hemorrhage. 2. Cholelithiasis. 3. Small right and moderate left pleural effusions with mild bibasilar atelectasis.      XR FOOT LEFT (MIN 3 VIEWS)    Result Date: 6/29/2022  No acute bony abnormalities are noted     XR FOOT RIGHT (MIN 3 VIEWS)    Result Date: 6/29/2022  No acute bony abnormalities are noted     XR CHEST PORTABLE    Result Date: 6/29/2022  Limited left basilar assessment, left retrocardiac density is suspected which may be related to a pleural and/or parenchymal process. Upright PA and lateral views of the chest are suggested for more accurate assessment of the left lung base       Consultations:    Consults:     Final Specialist Recommendations/Findings:   IP CONSULT TO GI  IP CONSULT TO CRITICAL CARE  IP CONSULT TO CARDIOLOGY      The patient was seen and examined on day of discharge and this discharge summary is in conjunction with any daily progress note from day of discharge. Discharge plan:     Disposition: Home    Physician Follow Up:     No follow-up provider specified.      Requiring Further Evaluation/Follow Up POST HOSPITALIZATION/Incidental Findings:     Diet: cardiac diet    Activity: As tolerated    Instructions to Patient:     Discharge Medications:      Medication List      START taking these medications    pantoprazole 40 MG tablet  Commonly known as: PROTONIX  Take 1 tablet by mouth every morning (before breakfast)        CONTINUE taking these medications    albuterol (2.5 MG/3ML) 0.083% nebulizer solution  Commonly known as: PROVENTIL     amiodarone 200 MG tablet  Commonly known as: CORDARONE     aspirin 81 MG EC tablet     * atorvastatin 80 MG tablet  Commonly known as: LIPITOR     * atorvastatin 40 MG tablet  Commonly known as: LIPITOR     bisacodyl 10 MG suppository  Commonly known as: DULCOLAX     ergocalciferol 1.25 MG (68668 UT) capsule  Commonly known as: ERGOCALCIFEROL     famotidine 20 MG tablet  Commonly known as: PEPCID     furosemide 40 MG tablet  Commonly known as: LASIX  Take 1 tablet by mouth 2 times daily     GLIPIZIDE PO     hydrALAZINE 10 MG tablet  Commonly known as: APRESOLINE     insulin lispro (1 Unit Dial) 100 UNIT/ML Sopn     isosorbide dinitrate 10 MG tablet  Commonly known as: ISORDIL     magnesium oxide 400 (240 Mg) MG tablet  Commonly known as: MAG-OX     metFORMIN 850 MG tablet  Commonly known as: GLUCOPHAGE     metoprolol succinate 25 MG extended release tablet  Commonly known as: TOPROL XL     PRESERVISION AREDS 2 PO     QUEtiapine 25 MG tablet  Commonly known as: SEROQUEL         * This list has 2 medication(s) that are the same as other medications prescribed for you. Read the directions carefully, and ask your doctor or other care provider to review them with you. STOP taking these medications    acetaminophen 325 MG tablet  Commonly known as: TYLENOL     apixaban 5 MG Tabs tablet  Commonly known as: ELIQUIS           Where to Get Your Medications      These medications were sent to [facility] [address] [facility], [address] [facility]  [address] [facility], [facility] [address]    Phone: [phone]   pantoprazole 40 MG tablet         No discharge procedures on file. Time Spent on discharge is  33 mins in patient examination, evaluation, counseling as well as medication reconciliation, prescriptions for required medications, discharge plan and follow up. Electronically signed by   Maryjo Nolasco MD  7/3/2022  12:34 PM      Thank you Dr. Mary Carmen Garibay MD for the opportunity to be involved in this patient's care.

## 2022-07-04 LAB
CULTURE: NORMAL
CULTURE: NORMAL
SPECIMEN DESCRIPTION: NORMAL
SPECIMEN DESCRIPTION: NORMAL

## 2022-07-05 ENCOUNTER — OFFICE VISIT (OUTPATIENT)
Dept: FAMILY MEDICINE CLINIC | Age: 77
End: 2022-07-05
Payer: MEDICARE

## 2022-07-05 ENCOUNTER — CARE COORDINATION (OUTPATIENT)
Dept: CASE MANAGEMENT | Age: 77
End: 2022-07-05

## 2022-07-05 ENCOUNTER — HOSPITAL ENCOUNTER (OUTPATIENT)
Dept: LAB | Age: 77
Discharge: HOME | End: 2022-07-05
Payer: MEDICARE

## 2022-07-05 VITALS
DIASTOLIC BLOOD PRESSURE: 50 MMHG | WEIGHT: 184 LBS | SYSTOLIC BLOOD PRESSURE: 122 MMHG | OXYGEN SATURATION: 99 % | HEIGHT: 70 IN | HEART RATE: 58 BPM | BODY MASS INDEX: 26.34 KG/M2

## 2022-07-05 DIAGNOSIS — E55.9 VITAMIN D DEFICIENCY: ICD-10-CM

## 2022-07-05 DIAGNOSIS — D50.0 IRON DEFICIENCY ANEMIA DUE TO CHRONIC BLOOD LOSS: ICD-10-CM

## 2022-07-05 DIAGNOSIS — D50.0 IRON DEFICIENCY ANEMIA DUE TO CHRONIC BLOOD LOSS: Primary | ICD-10-CM

## 2022-07-05 DIAGNOSIS — E83.42 HYPOMAGNESEMIA: ICD-10-CM

## 2022-07-05 DIAGNOSIS — N18.32 STAGE 3B CHRONIC KIDNEY DISEASE (HCC): ICD-10-CM

## 2022-07-05 DIAGNOSIS — N18.4 CKD (CHRONIC KIDNEY DISEASE) STAGE 4, GFR 15-29 ML/MIN (HCC): ICD-10-CM

## 2022-07-05 DIAGNOSIS — E11.8 TYPE 2 DIABETES MELLITUS WITH COMPLICATION (HCC): ICD-10-CM

## 2022-07-05 DIAGNOSIS — I25.5 ISCHEMIC CARDIOMYOPATHY: ICD-10-CM

## 2022-07-05 DIAGNOSIS — N18.30 STAGE 3 CHRONIC KIDNEY DISEASE, UNSPECIFIED WHETHER STAGE 3A OR 3B CKD (HCC): ICD-10-CM

## 2022-07-05 DIAGNOSIS — I50.22 CHRONIC SYSTOLIC (CONGESTIVE) HEART FAILURE (HCC): ICD-10-CM

## 2022-07-05 DIAGNOSIS — Z09 HOSPITAL DISCHARGE FOLLOW-UP: ICD-10-CM

## 2022-07-05 LAB
ABSOLUTE EOS #: 0.33 K/UL (ref 0–0.44)
ABSOLUTE IMMATURE GRANULOCYTE: 0.03 K/UL (ref 0–0.3)
ABSOLUTE LYMPH #: 0.9 K/UL (ref 1.1–3.7)
ABSOLUTE MONO #: 0.71 K/UL (ref 0.1–1.2)
ANION GAP SERPL CALCULATED.3IONS-SCNC: 12 MMOL/L (ref 9–17)
ANION GAP SERPL CALCULATED.3IONS-SCNC: 14 MMOL/L (ref 9–17)
BASOPHILS # BLD: 1 % (ref 0–2)
BASOPHILS ABSOLUTE: 0.04 K/UL (ref 0–0.2)
BUN BLDV-MCNC: 26 MG/DL (ref 8–23)
BUN BLDV-MCNC: 26 MG/DL (ref 8–23)
BUN/CREAT BLD: 12 (ref 9–20)
BUN/CREAT BLD: 12 (ref 9–20)
CALCIUM IONIZED: 1.2 MMOL/L (ref 1.13–1.33)
CALCIUM SERPL-MCNC: 8.6 MG/DL (ref 8.6–10.4)
CALCIUM SERPL-MCNC: 8.8 MG/DL (ref 8.6–10.4)
CHLORIDE BLD-SCNC: 98 MMOL/L (ref 98–107)
CHLORIDE BLD-SCNC: 99 MMOL/L (ref 98–107)
CO2: 23 MMOL/L (ref 20–31)
CO2: 24 MMOL/L (ref 20–31)
CREAT SERPL-MCNC: 2.1 MG/DL (ref 0.7–1.2)
CREAT SERPL-MCNC: 2.21 MG/DL (ref 0.7–1.2)
EOSINOPHILS RELATIVE PERCENT: 5 % (ref 1–4)
GFR AFRICAN AMERICAN: 35 ML/MIN
GFR AFRICAN AMERICAN: 37 ML/MIN
GFR NON-AFRICAN AMERICAN: 29 ML/MIN
GFR NON-AFRICAN AMERICAN: 31 ML/MIN
GFR SERPL CREATININE-BSD FRML MDRD: ABNORMAL ML/MIN/{1.73_M2}
GFR SERPL CREATININE-BSD FRML MDRD: ABNORMAL ML/MIN/{1.73_M2}
GLUCOSE BLD-MCNC: 130 MG/DL (ref 70–99)
GLUCOSE BLD-MCNC: 131 MG/DL (ref 70–99)
HCT VFR BLD CALC: 24.5 % (ref 40.7–50.3)
HCT VFR BLD CALC: 24.5 % (ref 40.7–50.3)
HEMOGLOBIN: 7.8 G/DL (ref 13–17)
HEMOGLOBIN: 7.8 G/DL (ref 13–17)
IMMATURE GRANULOCYTES: 1 %
LYMPHOCYTES # BLD: 14 % (ref 24–43)
MAGNESIUM: 2 MG/DL (ref 1.6–2.6)
MCH RBC QN AUTO: 27.5 PG (ref 25.2–33.5)
MCH RBC QN AUTO: 27.5 PG (ref 25.2–33.5)
MCHC RBC AUTO-ENTMCNC: 31.8 G/DL (ref 25.2–33.5)
MCHC RBC AUTO-ENTMCNC: 31.8 G/DL (ref 25.2–33.5)
MCV RBC AUTO: 86.3 FL (ref 82.6–102.9)
MCV RBC AUTO: 86.3 FL (ref 82.6–102.9)
MONOCYTES # BLD: 11 % (ref 3–12)
NRBC AUTOMATED: 0 PER 100 WBC
NRBC AUTOMATED: 0 PER 100 WBC
PDW BLD-RTO: 19 % (ref 11.8–14.4)
PDW BLD-RTO: 19 % (ref 11.8–14.4)
PHOSPHORUS: 4.3 MG/DL (ref 2.5–4.5)
PLATELET # BLD: 277 K/UL (ref 138–453)
PLATELET # BLD: 277 K/UL (ref 138–453)
PMV BLD AUTO: 10.1 FL (ref 8.1–13.5)
PMV BLD AUTO: 10.1 FL (ref 8.1–13.5)
POTASSIUM SERPL-SCNC: 3.5 MMOL/L (ref 3.7–5.3)
POTASSIUM SERPL-SCNC: 3.5 MMOL/L (ref 3.7–5.3)
PTH INTACT: 148.5 PG/ML (ref 15–65)
RBC # BLD: 2.84 M/UL (ref 4.21–5.77)
RBC # BLD: 2.84 M/UL (ref 4.21–5.77)
RBC # BLD: ABNORMAL 10*6/UL
SEG NEUTROPHILS: 68 % (ref 36–65)
SEGMENTED NEUTROPHILS ABSOLUTE COUNT: 4.39 K/UL (ref 1.5–8.1)
SODIUM BLD-SCNC: 135 MMOL/L (ref 135–144)
SODIUM BLD-SCNC: 135 MMOL/L (ref 135–144)
WBC # BLD: 6.4 K/UL (ref 3.5–11.3)
WBC # BLD: 6.4 K/UL (ref 3.5–11.3)

## 2022-07-05 PROCEDURE — 83036 HEMOGLOBIN GLYCOSYLATED A1C: CPT

## 2022-07-05 PROCEDURE — 99214 OFFICE O/P EST MOD 30 MIN: CPT | Performed by: FAMILY MEDICINE

## 2022-07-05 PROCEDURE — 99213 OFFICE O/P EST LOW 20 MIN: CPT

## 2022-07-05 PROCEDURE — 85025 COMPLETE CBC W/AUTO DIFF WBC: CPT

## 2022-07-05 PROCEDURE — 83735 ASSAY OF MAGNESIUM: CPT

## 2022-07-05 PROCEDURE — 83970 ASSAY OF PARATHORMONE: CPT

## 2022-07-05 PROCEDURE — 80048 BASIC METABOLIC PNL TOTAL CA: CPT

## 2022-07-05 PROCEDURE — 82330 ASSAY OF CALCIUM: CPT

## 2022-07-05 PROCEDURE — 82306 VITAMIN D 25 HYDROXY: CPT

## 2022-07-05 PROCEDURE — 85027 COMPLETE CBC AUTOMATED: CPT

## 2022-07-05 PROCEDURE — 84100 ASSAY OF PHOSPHORUS: CPT

## 2022-07-05 PROCEDURE — 1124F ACP DISCUSS-NO DSCNMKR DOCD: CPT | Performed by: FAMILY MEDICINE

## 2022-07-05 NOTE — CARE COORDINATION
[address] [facility] Transitions - Noted patient had hospital f/u appt with PCP today,  - will plan to contact patient     Patient: Irma Rodney   Patient : 1945   MRN: [id number]    Reason for Admission: GI bleed  Discharge Date: 22   RARS: Readmission Risk Score: 21.6 ( )      Last Discharge [address] [address]       Complaint Diagnosis Description Type Department Provider    22 Other Gastrointestinal hemorrhage, unspecified gastrointestinal hemorrhage type ED to Hosp-Admission (Discharged) (ADMITTED) [address] Mandy Vicente MD; Tania Figueroa. ..     22 Hypotension; Bradycardia Symptomatic anemia ED (TRANSFER) [facility] ED Marcio Black MD           Non-face-to-face services provided:  Obtained and reviewed discharge summary and/or continuity of care documents      Follow Up  Future Appointments   Date Time Provider Hany Guerra   2022  9:30 AM CHUN Sears [address] [place] [address]   2022 10:30 AM Darrick Kim MD [facility]   2022  1:30 PM Darrick Kim MD [facility]   10/5/2022  9:40 AM [facility]       Rahat Osei RN

## 2022-07-05 NOTE — PROGRESS NOTES
HPI:  Patient comes in today for   Chief Complaint   Patient presents with    Follow-Up from Hospital     Low Hgb. Transfusion done. Upper Gi normal. No GI/cardiology/nephrology consults done during hospitalization. Patient here to f/u has had multiple hospitalizations ,recently at [facility] with low hgb needed transfusion of 4 units had EGD was ok colonoscope was not performed since had one during his recent  hospitalization at [facility] had polypectomy and his eliquis dose was cut back has multiple medical problems [facility] has CAD s/p CABG in 2/2022,cardiomyopathy has life vest in place  ,s/p CVA in 8/2021 had thrombolytics no residual problems,HTN,DM Type 2,CKD. No chest pain or SOB. Has a sore in right great toe for the last 3 months is seeing  podiatry also has seen vascular. Has home health currently    HISTORY:  Past Medical History:   Diagnosis Date    Cerebrovascular accident (CVA) due to embolism of cerebral artery ([address]     CKD (chronic kidney disease) stage 4, GFR 15-29 ml/min ([facility])     Coronary artery disease involving coronary bypass graft of native heart without angina pectoris     H/O lower gastrointestinal bleeding     Tobacco abuse     Type II or unspecified type diabetes mellitus without mention of complication, not stated as uncontrolled        Past Surgical History:   Procedure Laterality Date    CARDIAC SURGERY      UPPER GASTROINTESTINAL ENDOSCOPY N/A 6/30/2022    ** BEDSIDE** EGD ESOPHAGOGASTRODUODENOSCOPY performed by Erasmo Arrington MD at [facility]        No family history on file.     Social History     Socioeconomic History    Marital status:      Spouse name: Not on file    Number of children: Not on file    Years of education: Not on file    Highest education level: Not on file   Occupational History    Not on file   Tobacco Use    Smoking status: Former Smoker     Packs/day: 1.50     Types: Cigarettes     Quit date: 2/28/2022     Years since quittin.3    Smokeless tobacco: Never Used   Substance and Sexual Activity    Alcohol use: No    Drug use: No    Sexual activity: Not on file   Other Topics Concern    Not on file   Social History Narrative    Not on file     Social Determinants of Health     Financial Resource Strain: Low Risk     Difficulty of Paying Living Expenses: Not hard at all   Food Insecurity: No Food Insecurity    Worried About Running Out of Food in the Last Year: Never true    [address] in the Last Year: Never true   Transportation Needs:     Lack of Transportation (Medical): Not on file    Lack of Transportation (Non-Medical):  Not on file   Physical Activity:     Days of Exercise per Week: Not on file    Minutes of Exercise per Session: Not on file   Stress:     Feeling of Stress : Not on file   Social Connections:     Frequency of Communication with Friends and Family: Not on file    Frequency of Social Gatherings with Friends and Family: Not on file    Attends Religion Services: Not on file    Active Member of [employer] or Organizations: Not on file    Attends Club or Organization Meetings: Not on file    Marital Status: Not on file   Intimate Partner Violence:     Fear of Current or Ex-Partner: Not on file    Emotionally Abused: Not on file    Physically Abused: Not on file    Sexually Abused: Not on file   Housing Stability:     Unable to Pay for Housing in the Last Year: Not on file    Number of [place] in the Last Year: Not on file    Unstable Housing in the Last Year: Not on file       Current Outpatient Medications   Medication Sig Dispense Refill    apixaban (ELIQUIS) 2.5 MG TABS tablet Take 2.5 mg by mouth 2 times daily      pantoprazole (PROTONIX) 40 MG tablet Take 1 tablet by mouth every morning (before breakfast) 30 tablet 3    furosemide (LASIX) 40 MG tablet Take 1 tablet by mouth 2 times daily 180 tablet 3    Multiple Vitamins-Minerals (PRESERVISION AREDS 2 PO) Take 1 tablet by mouth 2 times daily       amiodarone (CORDARONE) 200 MG tablet Take 200 mg by mouth daily      aspirin 81 MG EC tablet Take 81 mg by mouth daily      atorvastatin (LIPITOR) 40 MG tablet Take 40 mg by mouth nightly       bisacodyl (DULCOLAX) 10 MG suppository Place 10 mg rectally daily       ergocalciferol (ERGOCALCIFEROL) 1.25 MG (98894 UT) capsule TAKE ONE CAPSULE BY MOUTH ONCE EVERY WEEK FOR VITAMIN (D) DEFICIENCY      famotidine (PEPCID) 20 MG tablet Take 20 mg by mouth 2 times daily       hydrALAZINE (APRESOLINE) 10 MG tablet Take 10 mg by mouth 3 times daily      insulin lispro, 1 Unit Dial, 100 UNIT/ML SOPN Per s/s      isosorbide dinitrate (ISORDIL) 10 MG tablet Take 10 mg by mouth 3 times daily      magnesium oxide (MAG-OX) 400 (240 Mg) MG tablet Take 200 mg by mouth daily      metoprolol succinate (TOPROL XL) 25 MG extended release tablet Take 25 mg by mouth daily      QUEtiapine (SEROQUEL) 25 MG tablet Take 25 mg by mouth      atorvastatin (LIPITOR) 80 MG tablet Take 80 mg by mouth daily      GLIPIZIDE PO Take 5 mg by mouth daily       albuterol (PROVENTIL) (2.5 MG/3ML) 0.083% nebulizer solution Inhale 2.5 mg into the lungs every 6 hours as needed  (Patient not taking: Reported on 6/29/2022)      metFORMIN (GLUCOPHAGE) 850 MG tablet Take 850 mg by mouth 2 times daily (with meals)  (Patient not taking: Reported on 6/29/2022)       No current facility-administered medications for this visit. Allergies   Allergen Reactions    Simvastatin Headaches       REVIEW OF SYSTEMS:  General: No fevers, chills, change in weight  HEENT: No double vision, blurry vision, runny nose, sore throat, tinnitus  Cardio: No chest pain, palpitations, WILLIS, edema, PND  Pulmonary: No cough, hemoptysis, SOB  GI: No nausea, vomiting, dysphagia, odynophagia, diarrhea, has chronic constipation. : No dysuria, hematuria, urgency, incontinence  Musculoskeletal: No muscle or joint aches, no joint swelling  Neuro:  No dizziness/lightheadedness, no seizures  Endocrine: No polyuria, polydipsia, polyphagia, no temperature intolerance  Skin: No lesions or itching  Sleep: good  Psychiatric: No depression or anxiety    PHYSICAL EXAM:  VS:  BP (!) 122/50   Pulse 58   Ht 5' 10\" (1.778 m)   Wt 184 lb (83.5 kg)   SpO2 99%   BMI 26.40 kg/m²   General:  Alert and oriented, NAD  HEENT:  TMs, STEFAN, EOMI, Conjunctivae clear       Throat currently clear. NECK:  Supple without adenopathy or thyromegaly, no carotid bruits  LUNGS:  CTA all fields  HEART:  Irregular  ABDOMEN:  Soft and nontender without palpable abnormalities  EXTREMITIES:Right foot in dressing and boot,has mild swelling in  ankles. , no calf tenderness  NEURO:  No focal deficits. SKIN: Dry skin has some erythematous areas in both legs. ASSESSMENT/PLAN:     Diagnosis Orders   1. Iron deficiency anemia due to chronic blood loss  CBC   2. Stage 3 chronic kidney disease, unspecified whether stage 3a or 3b CKD ([address]     3. Chronic systolic (congestive) heart failure     4. Hospital discharge follow-up     5. Ischemic cardiomyopathy     6. CKD (chronic kidney disease) stage 4, GFR 15-29 ml/min ([facility])         Orders Placed This Encounter   Procedures    CBC     Standing Status:   Future     Standing Expiration Date:   7/5/2023     Requested Prescriptions      No prescriptions requested or ordered in this encounter   Patient has labs pending for today from his hospital discharge. Continue wound care for right foot  Patient to follow with podiatry for his toe infection. Follow with nephology from his hospital  visit. Medications reviewed,continue with current meds. Miralax prn constipation. Continue with home health. F.u with cardiology  Return in about 3 months (around 10/5/2022).     Electronically signed by Monae Martin MD

## 2022-07-06 ENCOUNTER — TELEPHONE (OUTPATIENT)
Dept: FAMILY MEDICINE CLINIC | Age: 77
End: 2022-07-06

## 2022-07-06 ENCOUNTER — CARE COORDINATION (OUTPATIENT)
Dept: CASE MANAGEMENT | Age: 77
End: 2022-07-06

## 2022-07-06 DIAGNOSIS — E87.6 HYPOKALEMIA: ICD-10-CM

## 2022-07-06 DIAGNOSIS — N18.32 STAGE 3B CHRONIC KIDNEY DISEASE (HCC): Primary | ICD-10-CM

## 2022-07-06 LAB
ESTIMATED AVERAGE GLUCOSE: 117 MG/DL
HBA1C MFR BLD: 5.7 % (ref 4–6)
VITAMIN D 25-HYDROXY: 48.7 NG/ML

## 2022-07-06 NOTE — CARE COORDINATION
[address] [address] Transitions Initial Follow Up Call    Call within 2 business days of discharge: Yes    Patient: Cynthia Rod   atkelly : 1945   MRN: [id number]    Reason for Admission: GI bleed - 4 transfusions for hgb 4  Discharge Date: 22   RARS: Readmission Risk Score: 21.6 ( )      Last Discharge [facility]       Complaint Diagnosis Description Type Department Provider    22 Other Gastrointestinal hemorrhage, unspecified gastrointestinal hemorrhage type ED to Hosp-Admission (Discharged) (ADMITTED) [facility] Severiano Border, MD; Guicho Rowe. .. 22 Hypotension; Bradycardia Symptomatic anemia ED (TRANSFER) [facility] ED Annette Griffith MD          Facility: [facility]    Non-face-to-face services provided:  Scheduled appointment with PCP-  Scheduled appointment with [facility]   Obtained and reviewed discharge summary and/or continuity of care documents     Spoke with:  patient, then he passed the phone to spouse Patient says his spouse \"handles all this information. \"  Spouse reports patient is weak, fatigued. \"  Noted that patient did receive 4 units blood while hospitalized for hgb in 4's - most recent level is 7.8. Denies any signs of bleeding. Denies SOB or swelling. Daily weights - normal baseline weight is 182 - today's weight is 185. Continues lasix 40mg twice daily - patient is scheduled to see [address] cardiology in couple days - . Informed spouse to give cardiologist all of this information & to review all meds at appt. Instructed spouse to take patient's med list & medications - patient usually goes to [address] specialists - explained all has to be communicated between different hospital systems so records/lists are coordinated. Patient continues to wear Life Vest.    Right foot wound care per home care when home care resumes.  Wound caare clinic appt with Leonel. Appt with Dr Jw Torres, nephrology same day.     Spouse informs me that [address] who usually follows patient did not receive the discharge information & they are unable to resume care until records/AVS is obtained. Will check with CT support for AVS/DC info to fax. I did contact [facility] of [address] [place] [address] who have not been able to obtain DC info from [facility]. Spoke with Yumiko Aguiar at home care office (phone# [phone]) & then contacted Angelic Anthony, [address] support who will fax AVS & DC info to fax # [phone] so that home care can resume as soon as info is received. Plan for next call: symptom management-reassess fatigue/weakness - daily weight  follow up appointment-review  [facility] cardio appt with patient/spouse  medication management-check for any med adjustments from cardio appt    Care Transitions 24 Hour Call    Schedule Follow Up Appointment with PCP: Completed  Do you have a copy of your discharge instructions?: Yes  Do you have all of your prescriptions and are they filled?: Yes  Have you been contacted by a [address]?: No  Have you scheduled your follow up appointment?: Yes (Comment: PCP )  How are you going to get to your appointment?: Car - family or friend to transport  Do you feel like you have everything you need to keep you well at home?: No (Comment: home care did not receive AVS/DC info from hospital and they set up patients medications - will check for CT support to fax to home care agency)  Care Transitions Interventions         Was this an external facility discharge? No     Challenges to be reviewed by the provider   Additional needs identified to be addressed with provider: No - patient was seen by PCP for hospital f/u yesterday,   none             Method of communication with provider : none    Advance Care Planning:   Does patient have an Advance Directive: not on file. Care Transition Nurse contacted the patient/spouse by telephone to perform post hospital discharge assessment. Verified name and  with patient as identifiers.  Provided introduction to self, and explanation of the CTN role. CTN reviewed discharge instructions, medical action plan and red flags with family/SPOUSE who verbalized understanding. Family given an opportunity to ask questions and does not have any further questions or concerns at this time. Were discharge instructions available to patient? Yes. Reviewed appropriate site of care based on symptoms and resources available to patient including: PCP  Specialist  Home health  CTN. The patient agrees to contact the PCP office for questions related to their healthcare. Medication reconciliation was not performed with spouse - she had med list in car - patient says spouse \"handles all that\"  Patient did have hospital f/u appt with PCP yesterday & noted that meds were reviewed. Emphasized to spouse to take med list/meds to all appts since several of physicians are from another hospital system - in order to keep track of all meds & make sure lists are correct. Was patient discharged with a pulse oximeter? no    CTN provided contact information. Plan for follow-up call in 5-7 days based on severity of symptoms and risk factors.   Plan for next call: symptom management-reassess fatigue/weakness - daily weight  follow up appointment-review 7/8 [facility] cardio appt with patient/spouse  medication management-check for any med adjustments from cardio appt        Follow Up  Future Appointments   Date Time Provider Hany Guerra   7/11/2022  9:30 AM CHUN Russo [address] [place] [address]   7/11/2022 10:30 AM Naman Partida MD [facility]   9/12/2022  1:30 PM Naman Partida MD [facility]   10/5/2022  9:40 AM Clara Camejo MD [facility]       Stephen Fulton, [place]

## 2022-07-06 NOTE — TELEPHONE ENCOUNTER
----- Message from Deanna London MD sent at 7/6/2022 10:19 AM EDT -----  His diabetes is weel controlled. Anemia remains the same with hgb at 7.8 which is the same as when he was discharged from the hospital.

## 2022-07-06 NOTE — TELEPHONE ENCOUNTER
Writer called and reviewed results with pt's wife. Wife voiced understanding, but wanting to know if there is anything to do to improve Milan's hgb. He was taken off iron with the latest hospitalization with a bleed. Anything else that can be done?

## 2022-07-07 ENCOUNTER — HOSPITAL ENCOUNTER (OUTPATIENT)
Age: 77
Setting detail: SPECIMEN
Discharge: HOME | End: 2022-07-07
Payer: MEDICARE

## 2022-07-07 DIAGNOSIS — N18.32 STAGE 3B CHRONIC KIDNEY DISEASE (HCC): ICD-10-CM

## 2022-07-07 LAB
CREATININE URINE: 83.2 MG/DL (ref 39–259)
TOTAL PROTEIN, URINE: 12 MG/DL
URINE TOTAL PROTEIN CREATININE RATIO: 0.14 (ref 0–0.2)

## 2022-07-07 PROCEDURE — 84156 ASSAY OF PROTEIN URINE: CPT

## 2022-07-07 PROCEDURE — 82570 ASSAY OF URINE CREATININE: CPT

## 2022-07-07 NOTE — TELEPHONE ENCOUNTER
Writer called pt's wife and informed her of Dr. Kimberley Esposito recommendations. Nyasia voiced understanding and had no further questions.

## 2022-07-08 ENCOUNTER — CARE COORDINATION (OUTPATIENT)
Dept: CASE MANAGEMENT | Age: 77
End: 2022-07-08

## 2022-07-08 NOTE — CARE COORDINATION
[facility] Transitions Follow Up Call    2022    Patient: Neelam Burns    Patient : 1945   MRN: [id number]    Reason for Admission: GI bleed - 4 transfusions for hgb 4 - hx CHF, ICM - Life Vest, PAF (Eliquis decreased), CKD  Discharge Date: 22   RARS: Readmission Risk Score: 21.6 ( )      Spoke with: spouse, Nyasia    Spouse reports patient is doing a little better - still weak & fatigued. Home care is resumed now after discharge info was faxed to [facility] [employer] by CT support - nurse has visited - awaiting PT. Denies SOB or swelling. Weight is up - 192, but spouse thinks is inaccurate since he weighed with clothes & shoes on - she is aware that it needs to be more consistent without clothes, shoes on from now on. [facility] Cardiology appt for today was rescheduled to  because of an illness in office. Patient is scheduled for nephrology appt Monday,  - instructed to take weight trends/records to that appt so it can be evaluated if there needs to be any diuretic adjustment based on labs/weight.symptoms. Noted that patient had 4 transfusions and IV fluids with each while hospitalized. Labs will be done prior to appt.       Plan for next call: symptom management-reassess if any SOB, swelling, check accurate weight  follow up appointment-review nephro appt & orders   medication management-check for any new dosage adjustments after nephro appt    Care Transitions Subsequent and Final Call    Schedule Follow Up Appointment with PCP: Completed  Subsequent and Final Calls  Do you have any ongoing symptoms?: No  Do you have any questions related to your medications?: No  Do you currently have any active services?: Yes  [facility]  Do you have any needs or concerns that I can assist you with?: No  Care Transitions Interventions  Other Interventions:             Needs to be reviewed by the provider   Additional needs identified to be addressed with provider: No  cardiology appt was cancelled for today, but rescheduled for  - has nephrology appt               Method of communication with provider : none      Care Transition Nurse contacted the family/SPOUSE by telephone to follow up after admission. Verified name and  with family as identifiers. Addressed changes since last contact: home health care[facility] nurse saw patient since AVS was faxed to then=m by CT support  Discussed follow-up appointments. If no appointment was previously scheduled, appointment scheduling offered: Yes. Is follow up appointment scheduled within 7 days of discharge? Yes. CTN reviewed discharge instructions, medical action plan and red flags with family and discussed any barriers to care and/or understanding of plan of care after discharge. Discussed appropriate site of care based on symptoms and resources available to patient including: PCP  Specialist  Home health  CTN. The family agrees to contact the PCP office for questions related to their healthcare. Patients top risk factors for readmission: medical condition-ICM, CHF, CKD, GI bleed  medication management  multiple health system providers  Interventions to address risk factors: Scheduled appointment with PCP-, Scheduled appointment with Specialist-cardio, nephro, Obtained and reviewed discharge summary and/or continuity of care documents and Communication with home health agencies or other community services the patient is currently using-home care started post DC      Non-[facility] follow up appointment(s): [facility] cardio rescheduled from  to     CTN provided contact information for future needs. Plan for follow-up call in 5-7 days based on severity of symptoms and risk factors.   Plan for next call: symptom management-reassess if any SOB, swelling, check accurate weight  follow up appointment-review nephro appt & orders   medication management-check for any new dosage adjustments after nephro appt        Follow Up  Future Appointments   Date Time Provider Hany Mari   7/11/2022  9:30 AM Rufino Saenz DPM [facility]   7/11/2022 10:30 AM Brandon Duarte MD [facility]   9/12/2022  1:30 PM Brandon Duarte MD [facility]   10/5/2022  9:40 AM Kristy Nevarez MD [facility]       Brian Beaver RN

## 2022-07-11 ENCOUNTER — OFFICE VISIT (OUTPATIENT)
Dept: NEPHROLOGY | Age: 77
End: 2022-07-11
Payer: MEDICARE

## 2022-07-11 ENCOUNTER — HOSPITAL ENCOUNTER (OUTPATIENT)
Dept: WOUND CARE | Age: 77
Discharge: HOME | End: 2022-07-12
Payer: MEDICARE

## 2022-07-11 VITALS
HEART RATE: 78 BPM | SYSTOLIC BLOOD PRESSURE: 126 MMHG | RESPIRATION RATE: 16 BRPM | BODY MASS INDEX: 26.4 KG/M2 | HEIGHT: 70 IN | TEMPERATURE: 97 F | DIASTOLIC BLOOD PRESSURE: 70 MMHG

## 2022-07-11 VITALS
DIASTOLIC BLOOD PRESSURE: 52 MMHG | SYSTOLIC BLOOD PRESSURE: 88 MMHG | HEART RATE: 56 BPM | WEIGHT: 184 LBS | HEIGHT: 70 IN | BODY MASS INDEX: 26.34 KG/M2

## 2022-07-11 DIAGNOSIS — Z87.19 HISTORY OF GI BLEED: ICD-10-CM

## 2022-07-11 DIAGNOSIS — L97.511 SKIN ULCER OF RIGHT GREAT TOE, LIMITED TO BREAKDOWN OF SKIN (HCC): Primary | ICD-10-CM

## 2022-07-11 DIAGNOSIS — I10 HYPERTENSION, ESSENTIAL: ICD-10-CM

## 2022-07-11 DIAGNOSIS — E55.9 VITAMIN D DEFICIENCY: ICD-10-CM

## 2022-07-11 DIAGNOSIS — E83.42 HYPOMAGNESEMIA: ICD-10-CM

## 2022-07-11 DIAGNOSIS — R60.0 BILATERAL LOWER EXTREMITY EDEMA: ICD-10-CM

## 2022-07-11 DIAGNOSIS — E87.6 HYPOKALEMIA: ICD-10-CM

## 2022-07-11 DIAGNOSIS — I25.5 ISCHEMIC CARDIOMYOPATHY: ICD-10-CM

## 2022-07-11 DIAGNOSIS — N18.32 TYPE 2 DIABETES MELLITUS WITH STAGE 3B CHRONIC KIDNEY DISEASE, WITHOUT LONG-TERM CURRENT USE OF INSULIN (HCC): ICD-10-CM

## 2022-07-11 DIAGNOSIS — E11.22 TYPE 2 DIABETES MELLITUS WITH STAGE 3B CHRONIC KIDNEY DISEASE, WITHOUT LONG-TERM CURRENT USE OF INSULIN (HCC): ICD-10-CM

## 2022-07-11 DIAGNOSIS — L97.521 ULCER OF TOE, LEFT, LIMITED TO BREAKDOWN OF SKIN (HCC): ICD-10-CM

## 2022-07-11 DIAGNOSIS — N18.4 CKD (CHRONIC KIDNEY DISEASE) STAGE 4, GFR 15-29 ML/MIN (HCC): Primary | ICD-10-CM

## 2022-07-11 PROCEDURE — 99213 OFFICE O/P EST LOW 20 MIN: CPT | Performed by: PODIATRIST

## 2022-07-11 PROCEDURE — G8427 DOCREV CUR MEDS BY ELIG CLIN: HCPCS | Performed by: INTERNAL MEDICINE

## 2022-07-11 PROCEDURE — 1111F DSCHRG MED/CURRENT MED MERGE: CPT | Performed by: INTERNAL MEDICINE

## 2022-07-11 PROCEDURE — 99214 OFFICE O/P EST MOD 30 MIN: CPT | Performed by: INTERNAL MEDICINE

## 2022-07-11 PROCEDURE — 3044F HG A1C LEVEL LT 7.0%: CPT | Performed by: INTERNAL MEDICINE

## 2022-07-11 PROCEDURE — G8417 CALC BMI ABV UP PARAM F/U: HCPCS | Performed by: INTERNAL MEDICINE

## 2022-07-11 PROCEDURE — 1036F TOBACCO NON-USER: CPT | Performed by: INTERNAL MEDICINE

## 2022-07-11 PROCEDURE — 2709999900 HC NON-CHARGEABLE SUPPLY

## 2022-07-11 PROCEDURE — 1124F ACP DISCUSS-NO DSCNMKR DOCD: CPT | Performed by: INTERNAL MEDICINE

## 2022-07-11 PROCEDURE — 99213 OFFICE O/P EST LOW 20 MIN: CPT | Performed by: INTERNAL MEDICINE

## 2022-07-11 RX ORDER — METOPROLOL SUCCINATE 25 MG/1
12.5 TABLET, EXTENDED RELEASE ORAL DAILY
Qty: 45 TABLET | Refills: 3 | Status: SHIPPED
Start: 2022-07-11

## 2022-07-11 RX ORDER — LIDOCAINE HYDROCHLORIDE 40 MG/ML
SOLUTION TOPICAL ONCE
Status: CANCELLED | OUTPATIENT
Start: 2022-07-11 | End: 2022-07-11

## 2022-07-11 RX ORDER — LIDOCAINE 40 MG/G
CREAM TOPICAL ONCE
Status: CANCELLED | OUTPATIENT
Start: 2022-07-11 | End: 2022-07-11

## 2022-07-11 RX ORDER — GINSENG 100 MG
CAPSULE ORAL ONCE
Status: CANCELLED | OUTPATIENT
Start: 2022-07-11 | End: 2022-07-11

## 2022-07-11 RX ORDER — BACITRACIN, NEOMYCIN, POLYMYXIN B 400; 3.5; 5 [USP'U]/G; MG/G; [USP'U]/G
OINTMENT TOPICAL ONCE
Status: CANCELLED | OUTPATIENT
Start: 2022-07-11 | End: 2022-07-11

## 2022-07-11 RX ORDER — LIDOCAINE 50 MG/G
OINTMENT TOPICAL ONCE
Status: CANCELLED | OUTPATIENT
Start: 2022-07-11 | End: 2022-07-11

## 2022-07-11 RX ORDER — LIDOCAINE HYDROCHLORIDE 20 MG/ML
JELLY TOPICAL ONCE
Status: CANCELLED | OUTPATIENT
Start: 2022-07-11 | End: 2022-07-11

## 2022-07-11 RX ORDER — BETAMETHASONE DIPROPIONATE 0.05 %
OINTMENT (GRAM) TOPICAL ONCE
Status: CANCELLED | OUTPATIENT
Start: 2022-07-11 | End: 2022-07-11

## 2022-07-11 RX ORDER — BACITRACIN ZINC AND POLYMYXIN B SULFATE 500; 1000 [USP'U]/G; [USP'U]/G
OINTMENT TOPICAL ONCE
Status: CANCELLED | OUTPATIENT
Start: 2022-07-11 | End: 2022-07-11

## 2022-07-11 RX ORDER — CEPHALEXIN 500 MG/1
500 CAPSULE ORAL 3 TIMES DAILY
Qty: 30 CAPSULE | Refills: 0 | Status: SHIPPED | OUTPATIENT
Start: 2022-07-11 | End: 2022-07-21

## 2022-07-11 RX ORDER — GENTAMICIN SULFATE 1 MG/G
OINTMENT TOPICAL ONCE
Status: CANCELLED | OUTPATIENT
Start: 2022-07-11 | End: 2022-07-11

## 2022-07-11 RX ORDER — CLOBETASOL PROPIONATE 0.5 MG/G
OINTMENT TOPICAL ONCE
Status: CANCELLED | OUTPATIENT
Start: 2022-07-11 | End: 2022-07-11

## 2022-07-11 NOTE — PLAN OF CARE
Problem: Chronic Conditions and Co-morbidities  Goal: Patient's chronic conditions and co-morbidity symptoms are monitored and maintained or improved  Outcome: Progressing     Problem: Cognitive:  Goal: Knowledge of wound care  Description: Knowledge of wound care  Outcome: Progressing  Goal: Understands risk factors for wounds  Description: Understands risk factors for wounds  Outcome: Progressing     Problem: Wound:  Goal: Will show signs of wound healing; wound closure and no evidence of infection  Description: Will show signs of wound healing; wound closure and no evidence of infection  Outcome: Progressing     Problem: Pressure Ulcer:  Goal: Signs of wound healing will improve  Description: Signs of wound healing will improve  Outcome: Progressing  Goal: Absence of new pressure ulcer  Description: Absence of new pressure ulcer  Outcome: Progressing  Goal: Will show no infection signs and symptoms  Description: Will show no infection signs and symptoms  Outcome: Progressing

## 2022-07-11 NOTE — PROGRESS NOTES
Subjective:    Sara Gage is a 68 y.o. male who presents with the tenderness right heel and new spot left toe. No new treatment started. Patient still has not followed up with vascular surgery was in the hospital last week for a GI bleed. Still has ulceration of the R big toe. . Patient has been compliant with off loading. Currently denies F/C/N/V. Overall diabetic control is not changed. Allergies   Allergen Reactions    Simvastatin Headaches       Past Medical History:   Diagnosis Date    Cerebrovascular accident (CVA) due to embolism of cerebral artery (HCC)     CKD (chronic kidney disease) stage 4, GFR 15-29 ml/min ([facility])     Coronary artery disease involving coronary bypass graft of native heart without angina pectoris     H/O lower gastrointestinal bleeding     Tobacco abuse     Type II or unspecified type diabetes mellitus without mention of complication, not stated as uncontrolled        Prior to Admission medications    Medication Sig Start Date End Date Taking?  Authorizing Provider   apixaban (ELIQUIS) 2.5 MG TABS tablet Take 2.5 mg by mouth 2 times daily    Historical Provider, MD   pantoprazole (PROTONIX) 40 MG tablet Take 1 tablet by mouth every morning (before breakfast) 7/3/22   Carmencita Holcomb MD   furosemide (LASIX) 40 MG tablet Take 1 tablet by mouth 2 times daily 5/13/22   Kaila Garcia MD   Multiple Vitamins-Minerals (PRESERVISION AREDS 2 PO) Take 1 tablet by mouth 2 times daily     Historical Provider, MD   albuterol (PROVENTIL) (2.5 MG/3ML) 0.083% nebulizer solution Inhale 2.5 mg into the lungs every 6 hours as needed     Historical Provider, MD   amiodarone (CORDARONE) 200 MG tablet Take 200 mg by mouth daily 2/24/22   Historical Provider, MD   aspirin 81 MG EC tablet Take 81 mg by mouth daily    Historical Provider, MD   atorvastatin (LIPITOR) 40 MG tablet Take 40 mg by mouth nightly  9/30/21   Historical Provider, MD   bisacodyl (DULCOLAX) 10 MG suppository Place 10 mg rectally daily     Historical Provider, MD   ergocalciferol (ERGOCALCIFEROL) 1.25 MG (58517 UT) capsule TAKE ONE CAPSULE BY MOUTH ONCE EVERY WEEK FOR VITAMIN (D) DEFICIENCY 10/12/21   Historical Provider, MD   hydrALAZINE (APRESOLINE) 10 MG tablet Take 10 mg by mouth 3 times daily 22   Historical Provider, MD   insulin lispro, 1 Unit Dial, 100 UNIT/ML SOPN Per s/s 22   Historical Provider, MD   isosorbide dinitrate (ISORDIL) 10 MG tablet Take 10 mg by mouth 3 times daily 22   Historical Provider, MD   magnesium oxide (MAG-OX) 400 (240 Mg) MG tablet Take 200 mg by mouth daily 3/31/22   Historical Provider, MD   metoprolol succinate (TOPROL XL) 25 MG extended release tablet Take 25 mg by mouth daily 21   Historical Provider, MD   QUEtiapine (SEROQUEL) 25 MG tablet Take 25 mg by mouth 22   Historical Provider, MD   GLIPIZIDE PO Take 5 mg by mouth daily     Historical Provider, MD   metFORMIN (GLUCOPHAGE) 850 MG tablet Take 850 mg by mouth 2 times daily (with meals)     Historical Provider, MD       Social History     Tobacco Use    Smoking status: Former Smoker     Packs/day: 1.50     Types: Cigarettes     Quit date: 2022     Years since quittin.3    Smokeless tobacco: Never Used   Substance Use Topics    Alcohol use: No       ROS: All 14 ROS systems reviewed and pertinent positives noted above, all others negative. Lower Extremity Physical Examination:     Vitals:   Vitals:    22 0944   BP: 126/70   Pulse: 78   Resp: 16   Temp: 97 °F (36.1 °C)     General: AAO x 3 in NAD. Vascular: DP and PT pulses palpable 1/4, bilateral.  CFT >5 seconds, bilateral.  Hair growth absent to the level of the digits, bilateral.  Edema present, bilateral.  Varicosities present, bilateral. Erythema absent, bilateral. Distal Rubor present bilateral toes. Temperature decreased bilateral. Hyperpigmentation present bilateral. Atrophic skin yes.   Neurological: Sensation intact to light touch to Post-Procedure Length (cm) 3.8 cm 06/27/22 0828   Post-Procedure Width (cm) 2.5 cm 06/27/22 0828   Post-Procedure Depth (cm) 0.1 cm 06/27/22 0828   Post-Procedure Surface Area (cm^2) 9.5 cm^2 06/27/22 0828   Post-Procedure Volume (cm^3) 0.95 cm^3 06/27/22 0828   Wound Assessment Eschar dry 06/27/22 0828   Drainage Amount None 06/27/22 0828   Odor None 06/27/22 0828   Yuli-wound Assessment Dry/flaky 06/27/22 0828   Margins Defined edges 06/27/22 0828   Wound Thickness Description not for Pressure Injury Partial thickness 06/27/22 0828   Number of days: 46         Asessment: Patient is a 68 y.o. male with:   Hospital Problems           Last Modified POA    Uncontrolled type 2 DM with peripheral circulatory disorder ([address] 7/11/2022 Yes    PAD (peripheral artery disease) ([address] 7/11/2022 Yes    Skin ulcer of right great toe, limited to breakdown of skin ([address] 7/11/2022 Yes    Ulcer of toe, left, limited to breakdown of skin ([address] 7/11/2022 Yes          Ulcer Classification:  Diabetic ulcer grade 2 C. IDSA  no infection. Plan:   Patient examined and evaluated. Diabetic foot education and exam.  Current condition and treatment options discussed in detail. Topical lidocaine applied to wound. Active wound management took place 100% non excisional with the use of  tissue nippers. All non viable tissue (including epidermis and/or dermis) and bio burden was removed to promote healing. Bleeding was present. Please see chart for exact measurements, if not documented then size was less than 20 sq cm. Today's dressing:betadine bid  DM foot ed and exam  Continue offloading with surgical shoe. Due to level of pain/deformity/condition, it is in my medical opinion that patient will benefit from and is medically necessary for offloading device at this time. Patient was dispensed a surgical shoe with offloading insole to wear L 100% of the time WB.   Patient was educated on appropriate use of the device and the need to be compliant with offloading therapy. Patient understands that non compliance with the device will be detrimental to the healing of the condition/ulceration. Patient needs the device to help support the foot and reduce pain. This device is medically necessary due to above listed diagnosis. Patient was encouraged to follow up for vascular intervention. Advised pt to get Heel lift offloading boot to wear at all times to relieve pressure from heel. Pt was educated on importance of this and how this can facilitate healing. Information was given on how to order this device. Contact clinic with any questions/problems/concerns or new signs of infection. Follow-up at [facility] in 1 week(s).

## 2022-07-11 NOTE — PROGRESS NOTES
Nephrology Progress Note    Laurie Gary MD      SUBJECTIVE      Chief Complaint   Patient presents with    Follow-Up from Hospital     post hospital follow up from 6/29/22 7/11/2022:  Patient is here for follow-up of his CKD 4 likely due to diabetic and hypertensive nephrosclerosis and possibly complicated by atheroembolic disease from cardiac catheterization 02/09/2022 along with h/o multiple GI bleeds with baseline creatinine now seems to be around 2 to 2.3 mg/DL. Patient does have history of GI bleed and has had multiple admissions due to the same. He was just recently admitted at [facility] and discharged on 7/3/2022 and was transferred from outlying facility with hemoglobin of 4.9. Patient did receive blood transfusions and got EGD done on 6/30/2022 showed 3 cm hiatal hernia no obvious evidence of bleeding. Colonoscopy to be done as outpatient. Patient's hemoglobin had remained stable and was discharged to follow-up as outpatient. Last hemoglobin on 7/5/2020 was 7.8. Patient also has history of cardiomyopathy and has LifeVest in place. Patient currently seems to be doing better. No new issues. No fever, no chills, no CP, no SOB, no N/V/D, no abdomen pain, no urinary complaints, +ve LE edema. He is currently on Lasix 40 mg p.o. twice daily, hydralazine 10 mg 3 times a day, Isordil 10 mg 3 times a day, metoprolol XL 25 mg daily, vitamin D 50,000 units weekly, magnesium oxide 200 mg daily. Last labs 7/5/2022: BUN/Cr 26/2.10, Na 135, K 3.5, Cl 98, Bicarb 23, Ca 8.8, Phos 4.3, .5, Vit D 48.7, Hb 7.8, UPC 0.14 (7/7/2022). Blood pressure today 88/52, heart rate 56.      5/13/2022:  Patient is here as a hospital follow-up. He was admitted to [facility] in April 2022, went to the hospital due to symptoms of black tarry stools and found to have GI bleed requiring blood transfusion.  Patient did have colonoscopy done 04/12/2022 status post cold forceps, cold snare, heart near polypectomy due to melena. Found to have polyps and internal hemorrhoids. He also suffered acute kidney injury secondary ischemic ATN from intravascular volume depletion related to blood loss anemia requiring blood transfusion along with hypotension and low flow state. Baseline creatinine prior to open-heart surgery about 2 months ago was 1-1.1 subsequently creatinine on discharge was 1.5 on 02/16/2022 and more recently about 10 days ago per patient his [address] had told him that he has developed kidney disease. From the [facility] records it looks like his creatinine was 2.28 mg/dL on 03/31/2022 and before that on 09/20/2021 it was 1.0 and previously 0.85 mg/dL in 8/2021. Creatinine on admission was 3.43 mg/dL, and then seem to have plateaued around 3.1SX/FV. His creatinine on discharge was 2.20 mg/dl (4/15/2022). Now most recent creatinine was 2.12 mg/dl on 4/29/2022. Patient also has history of CKD 3 likely due to diabetic and hypertensive nephrosclerosis and possibly complicated by atheroembolic disease from cardiac catheterization 02/09/2022 with baseline creatinine now seems to be around 2 to 2.3 mg/DL, diabetes type 2, hypertension, ischemic cardiomyopathy with EF of around 20 to 25% as per 2D echo done on 2/14/2022, coronary artery disease with history of CABG, atrial fibrillation, hypomagnesemia. Serology 04/10/2022: JOB negative. SPEP and immunofixation- Presence of monoclonal protein is unclear at this time. Suggest repeat in 3 to 6 months if clinically indicated. C3 71.1 and C 4 29.4. K/L 1.44. UPC 0.3. Urine eosinophiles negative. Renal ultrasound 04/11/2022: Right kidney 10.3 cm and left kidney 12.6 cm. Impression: Normal renal ultrasound. Cholelithiasis. Patient also has diabetic foot wound right toe and now follows up with wound care and podiatry. Patient doing well. No new issues.  No fever, no chills, no CP, no SOB, no N/V/D, no abdomen pain, no urinary complaints, +ve LE edema and right tow wound. He is currently on Lasix 40 mg daily, hydralazine 10 mg 3 times a day, Isordil 10 mg 3 times a day, metoprolol XL 25 mg daily, vitamin D 50,000 units weekly, magnesium oxide 200 mg daily. Last labs 4/29/2020: BUN/Cr 25/2.12, Na 137, K 4.0, Cl 101, Bicarb 25, Ca 8.8, Hb 8.0.  BP today 116/68, HR 68. History of present illness from initial hospital visit on 4/10/2022:  \"Known history of coronary artery disease history of coronary bypass graft 02/09/2022. Prior to that underwent cardiac catheterization 02/08/2022 for workup of cardiomyopathy (EF 20-25%) and was found to have multivessel disease. His creatinine prior to the procedure was in the 1.0-1.1 range. Post open heart surgery and on discharge on 16th February 2022 his creatinine was 1.5. Subsequently about 2 weeks ago he was seen by his [address] and was told that his creatinine was high, I do not have the exact lab results available to me. He also has known history of type 2 diabetes, previous history of CVA, persistent smoking and hypertension. Patient has been on dual antiplatelet agents, also has been on metformin and Lasix. He now comes into the hospital with 2-3 day history of noticing black stools. Initially symptoms began 04/07/2022, patient and his wife did note them subsequently on 04/09/2022 with bleeding was much more severe and the melanotic stools were much more significant and associated with diarrhea. Was taken into the ER at [facility]. Labs showed that he had a hemoglobin of 8.5 which is a drop from hemoglobin of 10.5 earlier. Subsequently it dropped down to 7.1. In addition he was hypotensive, with systolic pressure in the  range. Labs also showed that he had a creatinine of 3.5. Was then transferred to [facility] ER, is being transfused, nephrology consulted for acute kidney injury. At the time evaluation patient denies any complaints.  No shortness of breath, chest pain, PND, orthopnea. No cough phlegm hemoptysis. No fever chills. No nausea vomiting. Denies any digital infarcts, discoloration in his toes. No uncontrolled blood sugars no history of NSAID use. Labs this morning showed a creatinine of 3.1 potassium 4.8 bicarb 17 anion gap 11 calcium 7.9  Home medicines were reviewed, he has not had any Ace inhibitors does take loop diuretics. \"      Patient Active Problem List    Diagnosis Date Noted    Ulcer of toe, left, limited to breakdown of skin ([facility] 07/11/2022     Priority: Medium    Chronic renal disease, stage III [facility]) [[id number]] 07/05/2022     Priority: Medium    Chronic systolic (congestive) heart failure 07/05/2022     Priority: Medium    Acute posthemorrhagic anemia      Priority: Medium    Current use of long term anticoagulation      Priority: Medium    Normocytic normochromic anemia      Priority: Medium    GI bleed 06/30/2022     Priority: Medium    Ischemic cardiomyopathy 06/30/2022     Priority: Medium    Paroxysmal atrial fibrillation ([facility] 06/30/2022     Priority: Medium    Atherosclerosis of native arteries of extremities with intermittent claudication, bilateral legs ([facility] 05/26/2022     Priority: Medium    Uncontrolled type 2 DM with peripheral circulatory disorder ([facility] 05/09/2022     Priority: Medium    PAD (peripheral artery disease) ([facility] 05/09/2022     Priority: Medium    Skin ulcer of right great toe, limited to breakdown of skin ([facility] 05/09/2022     Priority: Medium    CKD (chronic kidney disease) stage 4, GFR 15-29 ml/min ([facility]) 04/29/2022     Priority: Medium    Type 2 diabetes mellitus with complication ([facility])     Tobacco abuse          CURRENT MEDICATIONS      Current Outpatient Medications   Medication Sig Dispense Refill    cephALEXin (KEFLEX) 500 MG capsule Take 1 capsule by mouth 3 times daily for 10 days 30 capsule 0    pantoprazole (PROTONIX) 40 MG tablet Take 1 tablet by mouth every morning (before breakfast) 30 tablet 3    furosemide (LASIX) 40 MG tablet Take 1 tablet by mouth 2 times daily 180 tablet 3    albuterol (PROVENTIL) (2.5 MG/3ML) 0.083% nebulizer solution Inhale 2.5 mg into the lungs every 6 hours as needed       amiodarone (CORDARONE) 200 MG tablet Take 200 mg by mouth daily      aspirin 81 MG EC tablet Take 81 mg by mouth daily      atorvastatin (LIPITOR) 40 MG tablet Take 40 mg by mouth nightly       ergocalciferol (ERGOCALCIFEROL) 1.25 MG (68674 UT) capsule TAKE ONE CAPSULE BY MOUTH ONCE EVERY WEEK FOR VITAMIN (D) DEFICIENCY      hydrALAZINE (APRESOLINE) 10 MG tablet Take 10 mg by mouth 3 times daily Hold if SBP <120      isosorbide dinitrate (ISORDIL) 10 MG tablet Take 10 mg by mouth 3 times daily      magnesium oxide (MAG-OX) 400 (240 Mg) MG tablet Take 200 mg by mouth daily      metoprolol succinate (TOPROL XL) 25 MG extended release tablet Take 25 mg by mouth daily      QUEtiapine (SEROQUEL) 25 MG tablet Take 25 mg by mouth      GLIPIZIDE PO Take 5 mg by mouth daily       apixaban (ELIQUIS) 2.5 MG TABS tablet Take 2.5 mg by mouth 2 times daily (Patient not taking: Reported on 7/11/2022)      Multiple Vitamins-Minerals (PRESERVISION AREDS 2 PO) Take 1 tablet by mouth 2 times daily  (Patient not taking: Reported on 7/11/2022)      bisacodyl (DULCOLAX) 10 MG suppository Place 10 mg rectally daily  (Patient not taking: Reported on 7/11/2022)      insulin lispro, 1 Unit Dial, 100 UNIT/ML SOPN Per s/s (Patient not taking: Reported on 7/11/2022)       No current facility-administered medications for this visit. REVIEW OF SYSTEMS     Constitutional: No fever, no chills, no lethargy, no weakness. HEENT:  No headache, otalgia, itchy eyes, nasal discharge or sore throat. Cardiac:  No chest pain, dyspnea, orthopnea or PND. Chest:   No cough, phlegm or wheezing or hemoptysis . Abdomen:  No abdominal pain, nausea or vomiting. Neuro:  No focal weakness, abnormal movements or seizure like activity.   Skin: No rashes, no itching. :   No hematuria, no pyuria, no dysuria, no flank pain. Extremities:  +ve b/L swelling or joint pains. ROS was otherwise negative except as mentioned in the [address] OBJECTIVE      Vitals:    07/11/22 1033   BP: (!) 88/52   Pulse: 56     Wt Readings from Last 2 Encounters:   07/11/22 184 lb (83.5 kg)   07/05/22 184 lb (83.5 kg)     Body mass index is 26.4 kg/m².      PHYSICAL EXAM      GENERAL APPEARANCE: awake, alert, in no acute distress  SKIN: warm and dry, no rash or erythema  EYES: conjunctivae normal and sclera anicteric  ENT: no thrush no pharyngeal congestion   NECK: supple    PULMONARY: clear to auscultation and no wheezing noted   CADRDIOVASCULAR: normal S1 & S2, no murmur, +ve life vest   ABDOMEN: soft nontender, bowel sounds, present, no organomegaly, no ascites   EXTREMITIES: no cyanosis, clubbing 1-2+ b/L edema  NEURO: alert and oriented, no deficits    LABS    CBC:   Lab Results   Component Value Date/Time    WBC 6.4 07/05/2022 11:46 AM    WBC 6.4 07/05/2022 11:46 AM    HGB 7.8 07/05/2022 11:46 AM    HGB 7.8 07/05/2022 11:46 AM    HCT 24.5 07/05/2022 11:46 AM    HCT 24.5 07/05/2022 11:46 AM    MCV 86.3 07/05/2022 11:46 AM    MCV 86.3 07/05/2022 11:46 AM    RDW 19.0 07/05/2022 11:46 AM    RDW 19.0 07/05/2022 11:46 AM     07/05/2022 11:46 AM     07/05/2022 11:46 AM    MPV 10.1 07/05/2022 11:46 AM    MPV 10.1 07/05/2022 11:46 AM      BMP:   Lab Results   Component Value Date/Time     07/05/2022 11:55 AM    K 3.5 07/05/2022 11:55 AM    CL 98 07/05/2022 11:55 AM    CO2 23 07/05/2022 11:55 AM    BUN 26 07/05/2022 11:55 AM    CREATININE 2.10 07/05/2022 11:55 AM    GLUCOSE 131 07/05/2022 11:55 AM    CALCIUM 8.8 07/05/2022 11:55 AM      PHOSPHORUS:    Lab Results   Component Value Date/Time    PHOS 4.3 07/05/2022 11:46 AM     MAGNESIUM:   Lab Results   Component Value Date/Time    MG 2.0 07/05/2022 11:46 AM     ALBUMIN:   Lab Results   Component Value Date/Time LABALBU 3.1 06/30/2022 02:21 AM     PTH: No components found for: PTHINTACT  VIT D3,25 HYDROXY: No results found for: VITD3     IRON:  No results found for: IRON  IRON SATURATION:  No results found for: LABIRON  TIBC:  No results found for: TIBC  FERRITIN:  No results found for: FERRITIN          JOB: No results found for: JOB    SPEP:   Lab Results   Component Value Date/Time    PROT 5.1 06/30/2022 02:21 AM     UPEP: No results found for: TPU   HEPBSAG:No results found for: HEPBSAG  HEPCAB:No results found for: HEPCAB  C3: No results found for: C3  C4: No results found for: C4  MPO ANCA: No results found for: MPO . PR3 ANCA:  No results found for: PR3                   URINALYSIS/URINE CHEMISTRIES      URINE CREATININE:    Lab Results   Component Value Date/Time    LABCREA 83.2 07/07/2022 11:22 AM     URINE EOSINOPHILS : No results found for: UREO  URINE PROTEIN:  No results found for: TPU        RADIOLOGY    No results found for this or any previous visit. ASSESSMENT     1. CKD 4 likely due to diabetic and hypertensive nephrosclerosis and possibly complicated by atheroembolic disease from cardiac catheterization 02/09/2022 along with h/o multiple GI bleeds with baseline creatinine now seems to be around 2 to 2.3 mg/DL. Last creatinine was 2.10 mg/DL on 7/5/2022. Serology 04/10/2022: JOB negative. SPEP and immunofixation- Presence of monoclonal protein is unclear at this time. Suggest repeat in 3 to 6 months if clinically indicated. C3 71.1 and C 4 29.4. K/L 1.44. UPC 0.3. Urine eosinophiles negative. Renal ultrasound 04/11/2022: Right kidney 10.3 cm and left kidney 12.6 cm. Impression: Normal renal ultrasound. Cholelithiasis. 2. Hypertension, long term. BP Readings from Last 3 Encounters:   07/11/22 (!) 88/52   07/11/22 126/70   07/05/22 (!) 122/50    :  3. Diabetes type 2, long term. 4.  Ischemic cardiomyopathy with a EF of around 20 to 25% as per 2D echo done on 2/14/2022.  Has life vest on and follows up with [facility]- Cardiology. 5.  Coronary artery disease with history of CABG. 6.  Atrial fibrillation. 7.  Hypomagnesemia. 8.  Vitamin D deficiency. 9.  Bilateral lower extremity edema. PLAN     1. Based on available labs patients renal function is stable. Patient's volume status is  acceptable. Importance of tight blood pressure, glycemic control, lipid control was outlined to patient . 2.  Continue Lasix to 40 mg BID. 3.  Cont hydralazine 10 mg 3 times a day (has hold parameters to hold if SBP less than 120), Isordil 10 mg 3 times a day. 4.  Will decrease metoprolol XL to 12.5 mg daily. 5.  Continue vitamin D 50,000 units weekly for now. 6.  Continue magnesium oxide 200 mg daily. 7.  Keep well-hydrated. 8.  BMP in 3 weeks. 9.  Cont to follow up with GI.  10. Follow up labs ordered : bmp, cbc, mg, phos, intact pth, vitaminD 25 OH. 11. Urine for random protein and creat to be done. 12.  Will send for repeat SPEP and immunofixation this visit. 13. Cont to follow up with Cardio, and will be getting an ACID soon. 14. Follow up in the office as scheduled in September 2022. [place] Patient was asked to avoid NSAIDS. Please do not hesitate to call with questions. Electronically signed by Candance Portal, MD on 7/11/2022 at 10:58 AM  [facility]     This note is created with the assistance of a speech-recognition program. While intending to generate a document that actually reflects the content of the visit, no guarantees can be provided that every mistake has been identified and corrected by editing.

## 2022-07-13 ENCOUNTER — CARE COORDINATION (OUTPATIENT)
Dept: CASE MANAGEMENT | Age: 77
End: 2022-07-13

## 2022-07-15 NOTE — CARE COORDINATION
[facility] Transitions Follow Up Call    2022    Patient: Anastacio Severe    Patient : 1945   MRN: [id number]    Reason for Admission: GI bleed - 4 transfusions for hgb 4 - hx CHF, ICM - Life Vest, PAF (Eliquis decreased), CKD  Discharge Date: 22   RARS: Readmission Risk Score: 21.6 ( )      Spoke with: spouse who is with patient. Denies any change in symptoms. Continues with [facility] . We reviewed most recent appts -  - nephrology appt +  wound care appt. Noted that Dr Bright Velazco with wound care is recommending vascular f/u - spouse is aware & plans to schedule with same vascular physician from [facility] that has seen patient before . Patient was seen by [facility] . They discussed the long term treatment plan- possible ICD based on patient's wishes, but if he declines ICD, he & spouse were told that it could be considered that Life Vest be discontinued. Currently the plan is to schedule a VICTORIANO to determine if eliquis needs to be continued factoring in the recent GI bleed. Spouse is handling coordinating appts & medications well, but appreciates all resources. We discussed eventual ACM referral & she is definitely interested. She also will have the assistance of [employer] care coordination. She also brought up possibility of palliative and/or HOSPICE at some point depending on their decisions. Admits this is stressful to consider. At this point they are both proceeding with current treatment plan. Weight is 188 today which is down from last time, but slightly increased overall - normal is 182-184. It was also discussed with cardiology that patient did receive 4 units of blood + IV fluids while in hospital. No change in diuretics at cardio appt. Spouse is interested in receiving any diabetic diet inf + CHF/low sodium information. Dietician referral sent.       Care Transitions Subsequent and Final Call    Schedule Follow Up Appointment with PCP: Completed  Subsequent and Final Calls  Have your medications changed?: No  Do you have any questions related to your medications?: No  Do you currently have any active services?: Yes  Are you currently active with any services?: Home Health  Do you have any needs or concerns that I can assist you with?: Yes (Comment: answered some questions spouse had as far as long term follow-up. Discussed plan of ACM referral - she is interested)  Care Transitions Interventions  Other Interventions:             Needs to be reviewed by the provider   Additional needs identified to be addressed with provider: No  none             Method of communication with provider : none      Care Transition Nurse contacted the family SPOUSE by telephone to follow up after admission Verified name and  with family as identifiers. Addressed changes since last contact: reviewed appts  Discussed follow-up appointments. If no appointment was previously scheduled, appointment scheduling offered: Yes. Is follow up appointment scheduled within 7 days of discharge? Yes. CTN reviewed discharge instructions, medical action plan and red flags with family and discussed any barriers to care and/or understanding of plan of care after discharge. Discussed appropriate site of care based on symptoms and resources available to patient including: PCP  Specialist  Home health. The family agrees to contact the PCP office for questions related to their healthcare. Patients top risk factors for readmission: medical condition-GI beed, PVD - foot wound, CHF CM, DM  multiple health system providers  Interventions to address risk factors: Scheduled appointment with PCP-, Scheduled appointment with Specialist-CARDIO, NEPHRO and Obtained and reviewed discharge summary and/or continuity of care documents    [facility]    CTN provided contact information for future needs.  Plan for follow-up call in 5-7 days based on severity of symptoms and risk factors.   Plan for next call: symptom management-reassess CHF symptoms - check weight, check GI bleeding  referrals-plan to [facility] on final week of July          Follow Up  Future Appointments   Date Time Provider Hany Mari   7/18/2022  9:45 AM CHUN Cabrerahoney [address] [facility]   7/25/2022 10:00 AM Tom Morgan DPM [facility]   8/1/2022  9:30 AM SCHEDULE, [facility] [place]   9/6/2022 10:15 AM SCHEDULE, [facility] MD[facility]Z [facility] [place]   9/12/2022  1:30 PM [facility]   10/5/2022  9:40 [facility]       Edwina Bernal RN

## 2022-07-18 ENCOUNTER — HOSPITAL ENCOUNTER (OUTPATIENT)
Dept: WOUND CARE | Age: 77
Discharge: HOME | End: 2022-07-19
Payer: MEDICARE

## 2022-07-18 VITALS
DIASTOLIC BLOOD PRESSURE: 56 MMHG | RESPIRATION RATE: 20 BRPM | TEMPERATURE: 98.1 F | HEART RATE: 60 BPM | BODY MASS INDEX: 26.4 KG/M2 | SYSTOLIC BLOOD PRESSURE: 118 MMHG | HEIGHT: 70 IN

## 2022-07-18 DIAGNOSIS — L97.511 SKIN ULCER OF RIGHT GREAT TOE, LIMITED TO BREAKDOWN OF SKIN (HCC): Primary | ICD-10-CM

## 2022-07-18 PROCEDURE — 99213 OFFICE O/P EST LOW 20 MIN: CPT

## 2022-07-18 PROCEDURE — 99213 OFFICE O/P EST LOW 20 MIN: CPT | Performed by: PODIATRIST

## 2022-07-18 RX ORDER — LIDOCAINE HYDROCHLORIDE 40 MG/ML
SOLUTION TOPICAL ONCE
Status: CANCELLED | OUTPATIENT
Start: 2022-07-18 | End: 2022-07-18

## 2022-07-18 RX ORDER — LIDOCAINE HYDROCHLORIDE 20 MG/ML
JELLY TOPICAL ONCE
Status: CANCELLED | OUTPATIENT
Start: 2022-07-18 | End: 2022-07-18

## 2022-07-18 RX ORDER — GINSENG 100 MG
CAPSULE ORAL ONCE
Status: CANCELLED | OUTPATIENT
Start: 2022-07-18 | End: 2022-07-18

## 2022-07-18 RX ORDER — LIDOCAINE 50 MG/G
OINTMENT TOPICAL ONCE
Status: CANCELLED | OUTPATIENT
Start: 2022-07-18 | End: 2022-07-18

## 2022-07-18 RX ORDER — BACITRACIN ZINC AND POLYMYXIN B SULFATE 500; 1000 [USP'U]/G; [USP'U]/G
OINTMENT TOPICAL ONCE
Status: CANCELLED | OUTPATIENT
Start: 2022-07-18 | End: 2022-07-18

## 2022-07-18 RX ORDER — BETAMETHASONE DIPROPIONATE 0.05 %
OINTMENT (GRAM) TOPICAL ONCE
Status: CANCELLED | OUTPATIENT
Start: 2022-07-18 | End: 2022-07-18

## 2022-07-18 RX ORDER — BACITRACIN, NEOMYCIN, POLYMYXIN B 400; 3.5; 5 [USP'U]/G; MG/G; [USP'U]/G
OINTMENT TOPICAL ONCE
Status: CANCELLED | OUTPATIENT
Start: 2022-07-18 | End: 2022-07-18

## 2022-07-18 RX ORDER — CLOBETASOL PROPIONATE 0.5 MG/G
OINTMENT TOPICAL ONCE
Status: CANCELLED | OUTPATIENT
Start: 2022-07-18 | End: 2022-07-18

## 2022-07-18 RX ORDER — GENTAMICIN SULFATE 1 MG/G
OINTMENT TOPICAL ONCE
Status: CANCELLED | OUTPATIENT
Start: 2022-07-18 | End: 2022-07-18

## 2022-07-18 RX ORDER — LIDOCAINE 40 MG/G
CREAM TOPICAL ONCE
Status: CANCELLED | OUTPATIENT
Start: 2022-07-18 | End: 2022-07-18

## 2022-07-18 NOTE — PROGRESS NOTES
Subjective:    Harmeet Matthews is a 68 y.o. male who presents with the ulcers bilateral.   Patient still has not followed up with vascular surgery. Patient is seeing cardiology tomorrow for procedure. .  . Patient has been compliant with off loading. Currently denies F/C/N/V. Overall diabetic control is not changed. Allergies   Allergen Reactions    Simvastatin Headaches       Past Medical History:   Diagnosis Date    Cerebrovascular accident (CVA) due to embolism of cerebral artery (HCC)     CKD (chronic kidney disease) stage 4, GFR 15-29 ml/min ([facility])     Coronary artery disease involving coronary bypass graft of native heart without angina pectoris     H/O lower gastrointestinal bleeding     Tobacco abuse     Type II or unspecified type diabetes mellitus without mention of complication, not stated as uncontrolled        Prior to Admission medications    Medication Sig Start Date End Date Taking? Authorizing Provider   cephALEXin (KEFLEX) 500 MG capsule Take 1 capsule by mouth 3 times daily for 10 days 7/11/22 7/21/22  Prema Coy DPM   metoprolol succinate (TOPROL XL) 25 MG extended release tablet Take 0.5 tablets by mouth daily 7/11/22   Blanca Ren MD   apixaban (ELIQUIS) 2.5 MG TABS tablet Take 2.5 mg by mouth in the morning and 2.5 mg before bedtime.     Historical Provider, MD   pantoprazole (PROTONIX) 40 MG tablet Take 1 tablet by mouth every morning (before breakfast) 7/3/22   Marco A Lugo MD   furosemide (LASIX) 40 MG tablet Take 1 tablet by mouth 2 times daily 5/13/22   Blanca Ren MD   Multiple Vitamins-Minerals (PRESERVISION AREDS 2 PO) Take 1 tablet by mouth 2 times daily   Patient not taking: No sig reported    Historical Provider, MD   albuterol (PROVENTIL) (2.5 MG/3ML) 0.083% nebulizer solution Inhale 2.5 mg into the lungs every 6 hours as needed     Historical Provider, MD   amiodarone (CORDARONE) 200 MG tablet Take 200 mg by mouth daily 2/24/22   Historical Provider, MD yes.  Neurological: Sensation intact to light touch to level of digits, bilateral.  Protective sensation intact 10/10 sites via 5.07/10g [place] Monofilament, bilateral.  negative Tinel's, bilateral.  negative Valleix sign, bilateral.  Vibratory intact bilateral.  Reflexes Decreased bilateral.  Paresthesias negative. Dysthesias negative. Sharp/dull intact bilateral.    Musculoskeletal: Muscle strength 5/5, bilateral.  Pain present upon palpation R hallux. Normal medial longitudinal arch, bilateral.  Ankle ROM decreased,bilateral.  1st MPJ ROM within normal limits, bilateral.  Dorsally contracted digits absent. No other foot deformities. Integument:   Open lesion present, Right. Ulcer medial right hallux with positive eschar formation. Distal aspect shows a healthier tissue base , breast is very stable. T  No undermining no probing no malodor no signs infection on periphery no proximal streaking. ulceration posterior lateral right heel superficial nature. No signs of infection. Patient also has ulcer distal left fourth toe with a mild superficial eschar. Purple discoloration seen right distal second third fourth toes with no current breakdown or signs of infection. Interdigital maceration absent to web spaces , Bilateral.  Nails b/l thickened, dystrophic and crumbly, discolored with subungual debris. Fissures absent, Bilateral. Hyperkeratotic tissue is absent.    Wound 05/05/22 Medial # 1 right great toe (Active)   Wound Image   07/18/22 0937   Wound Etiology Diabetic 07/18/22 0937   Dressing Status Old drainage noted 07/18/22 0937   Wound Cleansed Cleansed with saline 07/18/22 0937   Dressing/Treatment Betadine swabs/povidone iodine;Open to air 07/18/22 0937   Offloading for Diabetic Foot Ulcers Forefoot offloading shoe 07/18/22 0937   Dressing Change Due 07/19/22 07/18/22 0937   Wound Length (cm) 4.8 cm 07/18/22 0937   Wound Width (cm) 2.5 cm 07/18/22 0937   Wound Depth (cm) 0.1 cm 07/18/22 8785   Wound Surface Area (cm^2) 12 cm^2 07/18/22 0937   Change in Wound Size % (l*w) -110.53 07/18/22 0937   Wound Volume (cm^3) 1.2 cm^3 07/18/22 0937   Wound Healing % -111 07/18/22 0937   Post-Procedure Length (cm) 3.8 cm 06/27/22 0828   Post-Procedure Width (cm) 2.5 cm 06/27/22 0828   Post-Procedure Depth (cm) 0.1 cm 06/27/22 0828   Post-Procedure Surface Area (cm^2) 9.5 cm^2 06/27/22 0828   Post-Procedure Volume (cm^3) 0.95 cm^3 06/27/22 0828   Wound Assessment Pink/red 07/18/22 0937   Drainage Amount Scant 07/18/22 0937   Drainage Description Serosanguinous 07/18/22 0937   Odor None 07/18/22 0937   Yuli-wound Assessment Intact 07/18/22 0937   Margins Defined edges 07/18/22 0937   Wound Thickness Description not for Pressure Injury Partial thickness 07/18/22 0937   Number of days: 74       Wound 06/29/22 Toe (Comment  which one) Right 4th toe (Active)   Wound Image   07/18/22 0937   Wound Etiology Diabetic 07/18/22 0937   Dressing Status Dry 07/18/22 0937   Wound Cleansed Cleansed with saline 07/18/22 0937   Dressing/Treatment Betadine swabs/povidone iodine 07/18/22 0937   Offloading for Diabetic Foot Ulcers Forefoot offloading shoe 07/18/22 0937   Dressing Change Due 07/19/22 07/18/22 0937   Wound Length (cm) 0.5 cm 07/18/22 0937   Wound Width (cm) 0.7 cm 07/18/22 0937   Wound Depth (cm) 0.1 cm 07/18/22 0937   Wound Surface Area (cm^2) 0.35 cm^2 07/18/22 0937   Wound Volume (cm^3) 0.035 cm^3 07/18/22 0937   Wound Assessment Eschar dry 07/18/22 0937   Drainage Amount None 07/18/22 0937   Odor None 07/18/22 0937   Yuli-wound Assessment Dry/flaky 07/18/22 0937   Margins Defined edges 07/18/22 0937   Wound Thickness Description not for Pressure Injury Partial thickness 07/18/22 0937   Number of days: 18       Wound 06/29/22 Toe (Comment  which one) Left left 4th toe (Active)   Wound Image   07/18/22 0937   Wound Etiology Diabetic 07/18/22 0937   Dressing Status Dry 07/18/22 0937   Wound Cleansed Cleansed with saline 07/18/22 0937   Dressing/Treatment Betadine swabs/povidone iodine;Open to air 07/18/22 0937   Offloading for Diabetic Foot Ulcers Forefoot offloading shoe 07/18/22 0937   Dressing Change Due 07/19/22 07/18/22 0937   Wound Assessment Eschar dry 07/18/22 0937   Drainage Amount None 07/18/22 0937   Odor None 07/18/22 0937   Yuli-wound Assessment Dry/flaky 07/18/22 0937   Margins Defined edges 07/18/22 0937   Wound Thickness Description not for Pressure Injury Partial thickness 07/18/22 0937   Number of days: 18       Wound 06/30/22 Heel Right (Active)   Wound Image   07/18/22 0937   Wound Etiology Diabetic 07/18/22 0937   Dressing Status Dry 07/18/22 0937   Wound Cleansed Cleansed with saline 07/18/22 0937   Dressing/Treatment Betadine swabs/povidone iodine;Open to air 07/18/22 0937   Offloading for Diabetic Foot Ulcers Forefoot offloading shoe 07/18/22 0937   Dressing Change Due 07/19/22 07/18/22 0937   Wound Length (cm) 1 cm 07/18/22 0937   Wound Width (cm) 1.7 cm 07/18/22 0937   Wound Depth (cm) 0.1 cm 07/18/22 0937   Wound Surface Area (cm^2) 1.7 cm^2 07/18/22 0937   Change in Wound Size % (l*w) -844.44 07/18/22 0937   Wound Volume (cm^3) 0.17 cm^3 07/18/22 0937   Wound Healing % -372 07/18/22 0937   Wound Assessment Eschar dry 07/18/22 0937   Drainage Amount None 07/18/22 0937   Odor None 07/18/22 0937   Yuli-wound Assessment Dry/flaky 07/18/22 0937   Margins Defined edges 07/18/22 0937   Wound Thickness Description not for Pressure Injury Full thickness 07/18/22 0937   Number of days: 17       Wound 06/30/22 Foot Right;Lateral (Active)   Wound Image   06/30/22 1150   Wound Etiology Diabetic 07/18/22 0937   Dressing Status Dry 07/18/22 0937   Wound Cleansed Cleansed with saline 07/18/22 0937   Dressing/Treatment Betadine swabs/povidone iodine;Dry dressing 07/18/22 0937   Offloading for Diabetic Foot Ulcers Forefoot offloading shoe 07/18/22 0937   Dressing Change Due 07/19/22 07/18/22 0937   Wound Length (cm) 0.5 cm 06/30/22 1150   Wound Width (cm) 0.3 cm 06/30/22 1150   Wound Depth (cm) 0.1 cm 06/30/22 1150   Wound Surface Area (cm^2) 0.15 cm^2 06/30/22 1150   Wound Volume (cm^3) 0.015 cm^3 06/30/22 1150   Wound Assessment Eschar dry 07/18/22 0937   Drainage Amount None 07/18/22 0937   Odor None 07/18/22 0937   Yuli-wound Assessment Dry/flaky 07/18/22 0937   Margins Defined edges 07/18/22 0937   Wound Thickness Description not for Pressure Injury Full thickness 07/18/22 0937   Number of days: 17         Asessment: Patient is a 68 y.o. male with:   Hospital Problems             Last Modified POA    Uncontrolled type 2 DM with peripheral circulatory disorder ([address] 7/18/2022 Yes    PAD (peripheral artery disease) ([address] 7/18/2022 Yes    Skin ulcer of right great toe, limited to breakdown of skin ([address] 7/18/2022 Yes    Ulcer of toe, left, limited to breakdown of skin ([address] 7/18/2022 Yes       Ulcer Classification:  Diabetic ulcer grade 2 C. IDSA  no infection. Plan:   Patient examined and evaluated. Diabetic foot education and exam.  Current condition and treatment options discussed in detail. No debridement needed today. Today's dressing:betadine bid  DM foot ed and exam  Continue offloading with surgical shoe. Offloading shoes at all times weightbearing. Patient was encouraged to follow up for vascular intervention. Patient is yet to reach back out to his vascular surgeon. Patient states there is a cardiology procedure tomorrow first.  Continue heel lift offloading boot to wear at all times to relieve pressure from heel. Contact clinic with any questions/problems/concerns or new signs of infection. Follow-up at [facility] in 2 week(s).

## 2022-07-18 NOTE — PLAN OF CARE
Problem: Chronic Conditions and Co-morbidities  Goal: Patient's chronic conditions and co-morbidity symptoms are monitored and maintained or improved  Outcome: Progressing Towards Goal     Problem: Cognitive:  Goal: Knowledge of wound care  Description: Knowledge of wound care  Outcome: Progressing Towards Goal  Goal: Understands risk factors for wounds  Description: Understands risk factors for wounds  Outcome: Progressing Towards Goal     Problem: Wound:  Goal: Will show signs of wound healing; wound closure and no evidence of infection  Description: Will show signs of wound healing; wound closure and no evidence of infection  Outcome: Progressing Towards Goal     Problem: Pressure Ulcer:  Goal: Signs of wound healing will improve  Description: Signs of wound healing will improve  Outcome: Progressing Towards Goal  Goal: Absence of new pressure ulcer  Description: Absence of new pressure ulcer  Outcome: Progressing Towards Goal  Goal: Will show no infection signs and symptoms  Description: Will show no infection signs and symptoms  Outcome: Progressing Towards Goal     Problem: Smoking cessation:  Goal: Ability to formulate a plan to maintain a tobacco-free life will be supported  Description: Ability to formulate a plan to maintain a tobacco-free life will be supported  Outcome: Progressing Towards Goal     Problem: Compression therapy:  Goal: Will be free from complications associated with compression therapy  Description: Will be free from complications associated with compression therapy  Outcome: Progressing Towards Goal     Problem: Falls - Risk of:  Goal: Will remain free from falls  Description: Will remain free from falls  Outcome: Progressing Towards Goal     Problem: Blood Glucose:  Goal: Ability to maintain appropriate glucose levels will improve  Description: Ability to maintain appropriate glucose levels will improve  Outcome: Progressing Towards Goal

## 2022-07-18 NOTE — DISCHARGE INSTRUCTIONS
Follow up as scheduled with Dr. Castillo March on 8/1/2022 at 9 AM.        Continue to place betadine to wounds. Change dressing daily. Wear offloading boots while in bed at all times. Wear offloading shoes while up and moving. SIGNS OF INFECTION  - Redness, swelling, skin hot  - Wound bed turns black or stringy yellow  - Foul odor  - Increased drainage or pus  - Increased pain  - Fever greater than 100F    CALL YOUR DOCTOR OR SEEK MEDICAL ATTENTION IF SIGNS OF INFECTION.   DO NOT WAIT UNTIL YOUR NEXT APPOINTMENT    Call the Wound Care Nurse with any other questions or concerns- [phone]

## 2022-07-20 ENCOUNTER — TELEPHONE (OUTPATIENT)
Dept: OTHER | Facility: CLINIC | Age: 77
End: 2022-07-20

## 2022-07-20 ENCOUNTER — HOSPITAL ENCOUNTER (EMERGENCY)
Age: 77
Discharge: ANOTHER HOSPITAL | End: 2022-07-21
Attending: EMERGENCY MEDICINE
Payer: OTHER GOVERNMENT

## 2022-07-20 ENCOUNTER — APPOINTMENT (OUTPATIENT)
Dept: GENERAL RADIOLOGY | Age: 77
End: 2022-07-20
Payer: OTHER GOVERNMENT

## 2022-07-20 DIAGNOSIS — I50.43 ACUTE ON CHRONIC COMBINED SYSTOLIC AND DIASTOLIC CONGESTIVE HEART FAILURE (HCC): Primary | ICD-10-CM

## 2022-07-20 LAB
ABSOLUTE EOS #: 0.17 K/UL (ref 0–0.44)
ABSOLUTE IMMATURE GRANULOCYTE: <0.03 K/UL (ref 0–0.3)
ABSOLUTE LYMPH #: 1.07 K/UL (ref 1.1–3.7)
ABSOLUTE MONO #: 0.76 K/UL (ref 0.1–1.2)
ALBUMIN SERPL-MCNC: 3.4 G/DL (ref 3.5–5.2)
ALBUMIN/GLOBULIN RATIO: 1.4 (ref 1–2.5)
ALP BLD-CCNC: 85 U/L (ref 40–129)
ALT SERPL-CCNC: 9 U/L (ref 5–41)
ANION GAP SERPL CALCULATED.3IONS-SCNC: 12 MMOL/L (ref 9–17)
AST SERPL-CCNC: 11 U/L
BASOPHILS # BLD: 1 % (ref 0–2)
BASOPHILS ABSOLUTE: 0.04 K/UL (ref 0–0.2)
BILIRUB SERPL-MCNC: 0.33 MG/DL (ref 0.3–1.2)
BUN BLDV-MCNC: 28 MG/DL (ref 8–23)
BUN/CREAT BLD: 14 (ref 9–20)
CALCIUM SERPL-MCNC: 8.5 MG/DL (ref 8.6–10.4)
CHLORIDE BLD-SCNC: 96 MMOL/L (ref 98–107)
CO2: 22 MMOL/L (ref 20–31)
CREAT SERPL-MCNC: 2.07 MG/DL (ref 0.7–1.2)
EOSINOPHILS RELATIVE PERCENT: 3 % (ref 1–4)
GFR AFRICAN AMERICAN: 38 ML/MIN
GFR NON-AFRICAN AMERICAN: 31 ML/MIN
GFR SERPL CREATININE-BSD FRML MDRD: ABNORMAL ML/MIN/{1.73_M2}
GLUCOSE BLD-MCNC: 163 MG/DL (ref 70–99)
HCT VFR BLD CALC: 24.2 % (ref 40.7–50.3)
HEMOGLOBIN: 7.4 G/DL (ref 13–17)
IMMATURE GRANULOCYTES: 0 %
LYMPHOCYTES # BLD: 20 % (ref 24–43)
MCH RBC QN AUTO: 26.1 PG (ref 25.2–33.5)
MCHC RBC AUTO-ENTMCNC: 30.6 G/DL (ref 25.2–33.5)
MCV RBC AUTO: 85.2 FL (ref 82.6–102.9)
MONOCYTES # BLD: 15 % (ref 3–12)
NRBC AUTOMATED: 0 PER 100 WBC
PDW BLD-RTO: 18.8 % (ref 11.8–14.4)
PLATELET # BLD: 229 K/UL (ref 138–453)
PMV BLD AUTO: 9.6 FL (ref 8.1–13.5)
POTASSIUM SERPL-SCNC: 4.4 MMOL/L (ref 3.7–5.3)
PRO-BNP: 7148 PG/ML
RBC # BLD: 2.84 M/UL (ref 4.21–5.77)
RBC # BLD: ABNORMAL 10*6/UL
SEG NEUTROPHILS: 61 % (ref 36–65)
SEGMENTED NEUTROPHILS ABSOLUTE COUNT: 3.19 K/UL (ref 1.5–8.1)
SODIUM BLD-SCNC: 130 MMOL/L (ref 135–144)
TOTAL PROTEIN: 5.9 G/DL (ref 6.4–8.3)
TROPONIN, HIGH SENSITIVITY: 88 NG/L (ref 0–22)
WBC # BLD: 5.3 K/UL (ref 3.5–11.3)

## 2022-07-20 PROCEDURE — 93005 ELECTROCARDIOGRAM TRACING: CPT | Performed by: EMERGENCY MEDICINE

## 2022-07-20 PROCEDURE — 83880 ASSAY OF NATRIURETIC PEPTIDE: CPT

## 2022-07-20 PROCEDURE — 36415 COLL VENOUS BLD VENIPUNCTURE: CPT

## 2022-07-20 PROCEDURE — 99285 EMERGENCY DEPT VISIT HI MDM: CPT

## 2022-07-20 PROCEDURE — 71045 X-RAY EXAM CHEST 1 VIEW: CPT

## 2022-07-20 PROCEDURE — 84484 ASSAY OF TROPONIN QUANT: CPT

## 2022-07-20 PROCEDURE — 80053 COMPREHEN METABOLIC PANEL: CPT

## 2022-07-20 PROCEDURE — 85025 COMPLETE CBC W/AUTO DIFF WBC: CPT

## 2022-07-20 ASSESSMENT — ENCOUNTER SYMPTOMS
BLOOD IN STOOL: 0
BACK PAIN: 0
CONSTIPATION: 0
SORE THROAT: 0
WHEEZING: 0
TROUBLE SWALLOWING: 0
ABDOMINAL PAIN: 0
SHORTNESS OF BREATH: 1
VOMITING: 0
DIARRHEA: 0
NAUSEA: 0

## 2022-07-20 ASSESSMENT — PAIN - FUNCTIONAL ASSESSMENT: PAIN_FUNCTIONAL_ASSESSMENT: NONE - DENIES PAIN

## 2022-07-20 NOTE — ED PROVIDER NOTES
[id number] [facility]  eMERGENCY dEPARTMENT eNCOUnter      Pt Name: Иван Gonzalez  MRN: [id number]  [place] 1945  Date of evaluation: 7/20/2022      CHIEF COMPLAINT       Chief Complaint   Patient presents with    Weight Gain         HISTORY OF PRESENT ILLNESS    Иван Gonzalez is a 68 y.o. male who presents complaint of increasing weight with increasing edema he has a history of CHF coronary artery disease he is wearing a LifeVest he had a recent admission for gastrointestinal bleeding patient says he has been feeling pretty good but over the last several days he started having increased fluid about increased edema of his legs he takes Lasix twice a day. He did see his podiatrist she has foot ulcers I saw podiatry today and they are doing well he says there is been no fevers or chills or cough he does have some shortness of breath especially with activity    Patient says he has been up 7 pounds this week, he is on aspirin and Eliquis  REVIEW OF SYSTEMS         Review of Systems   Constitutional:  Negative for chills and fever. HENT:  Negative for congestion, dental problem, sore throat and trouble swallowing. Eyes:  Negative for visual disturbance. Respiratory:  Positive for shortness of breath. Negative for wheezing. Cardiovascular:  Positive for leg swelling. Negative for chest pain and palpitations. Gastrointestinal:  Negative for abdominal pain, blood in stool, constipation, diarrhea, nausea and vomiting. Genitourinary:  Negative for difficulty urinating, dysuria and testicular pain. Musculoskeletal:  Negative for back pain, joint swelling and neck pain. Skin:  Negative for rash. Neurological:  Negative for dizziness, syncope, weakness and headaches. Hematological:  Negative for adenopathy. Does not bruise/bleed easily. Psychiatric/Behavioral:  Negative for confusion and suicidal ideas.         PAST MEDICAL HISTORY    has a past medical history of Cerebrovascular accident (CVA) due to embolism of cerebral artery ([facility] CKD (chronic kidney disease) stage 4, GFR 15-29 ml/min ([facility]), Coronary artery disease involving coronary bypass graft of native heart without angina pectoris, H/O lower gastrointestinal bleeding, Tobacco abuse, and Type II or unspecified type diabetes mellitus without mention of complication, not stated as uncontrolled. SURGICAL HISTORY      has a past surgical history that includes Cardiac surgery and Upper gastrointestinal endoscopy (N/A, 6/30/2022). CURRENT MEDICATIONS       Previous Medications    ALBUTEROL (PROVENTIL) (2.5 MG/3ML) 0.083% NEBULIZER SOLUTION    Inhale 2.5 mg into the lungs every 6 hours as needed     AMIODARONE (CORDARONE) 200 MG TABLET    Take 200 mg by mouth daily    APIXABAN (ELIQUIS) 2.5 MG TABS TABLET    Take 2.5 mg by mouth in the morning and 2.5 mg before bedtime. ASPIRIN 81 MG EC TABLET    Take 81 mg by mouth daily    ATORVASTATIN (LIPITOR) 40 MG TABLET    Take 40 mg by mouth nightly     BISACODYL (DULCOLAX) 10 MG SUPPOSITORY    Place 10 mg rectally in the morning.     CEPHALEXIN (KEFLEX) 500 MG CAPSULE    Take 1 capsule by mouth 3 times daily for 10 days    ERGOCALCIFEROL (ERGOCALCIFEROL) 1.25 MG (34732 UT) CAPSULE    TAKE ONE CAPSULE BY MOUTH ONCE EVERY WEEK FOR VITAMIN (D) DEFICIENCY    FUROSEMIDE (LASIX) 40 MG TABLET    Take 1 tablet by mouth 2 times daily    GLIPIZIDE PO    Take 5 mg by mouth daily     HYDRALAZINE (APRESOLINE) 10 MG TABLET    Take 10 mg by mouth 3 times daily Hold if SBP <120    INSULIN LISPRO, 1 UNIT DIAL, 100 UNIT/ML SOPN    Per s/s    ISOSORBIDE DINITRATE (ISORDIL) 10 MG TABLET    Take 10 mg by mouth 3 times daily    MAGNESIUM OXIDE (MAG-OX) 400 (240 MG) MG TABLET    Take 200 mg by mouth daily    METOPROLOL SUCCINATE (TOPROL XL) 25 MG EXTENDED RELEASE TABLET    Take 0.5 tablets by mouth daily    MULTIPLE VITAMINS-MINERALS (PRESERVISION AREDS 2 PO)    Take 1 tablet by mouth 2 times daily     PANTOPRAZOLE (PROTONIX) 40 MG TABLET    Take 1 tablet by mouth every morning (before breakfast)    QUETIAPINE (SEROQUEL) 25 MG TABLET    Take 25 mg by mouth       ALLERGIES     is allergic to simvastatin. FAMILY HISTORY     He indicated that his mother is . He indicated that his father is . He indicated that both of his sisters are alive. He indicated that his brother is alive. family history is not on file. SOCIAL HISTORY      reports that he quit smoking about 4 months ago. His smoking use included cigarettes. He smoked an average of 1.5 packs per day. He has never used smokeless tobacco. He reports that he does not drink alcohol and does not use drugs. PHYSICAL EXAM     INITIAL VITALS:  height is 5' 10\" (1.778 m) and weight is 199 lb (90.3 kg). His tympanic temperature is 97.7 °F (36.5 °C). His blood pressure is 128/92 (abnormal) and his pulse is 48 (abnormal). His respiration is 18 and oxygen saturation is 98%. Physical Exam  Constitutional:       Appearance: Normal appearance. He is well-developed. Comments:                                                                                                                                                             Well-developed male he is wearing a LifeVest   HENT:      Head: Normocephalic and atraumatic. Right Ear: External ear normal.      Left Ear: External ear normal.   Eyes:      Pupils: Pupils are equal, round, and reactive to light. Cardiovascular:      Rate and Rhythm: Normal rate and regular rhythm. Pulmonary:      Effort: Pulmonary effort is normal.      Breath sounds: Normal breath sounds. Abdominal:      General: Bowel sounds are normal.      Palpations: Abdomen is soft. Musculoskeletal:         General: No tenderness or signs of injury. Normal range of motion. Cervical back: Normal range of motion and neck supple. Right lower leg: Edema present. Left lower leg: Edema present.       Comments: Patient has postop shoes on both legs are swollen with pitting edema no erythema no signs of DVT   Skin:     General: Skin is warm and dry. Neurological:      Mental Status: He is alert and oriented to person, place, and time. Psychiatric:         Behavior: Behavior normal.         DIFFERENTIAL DIAGNOSIS/ MDM:     Cute exacerbation of CHF  DIAGNOSTIC RESULTS     EKG: All EKG's are interpreted by the Emergency Department Physician who either signs or Co-signs this chart in the absence of a cardiologist.    Sinus bradycardia rate of 48 bpm parables 184 ms QRS durations 144 ms QT corrected 440 ms axis is 27 there is no acute ST changes he does have a left bundle branch block which is old    RADIOLOGY:   I directly visualized the following  images and reviewed the radiologist interpretations:     [address]       7/20/2022 4:16 pm       COMPARISON:   Chest x-ray dated 29 June 2022       HISTORY:   ORDERING SYSTEM PROVIDED HISTORY: shortness of breath   TECHNOLOGIST PROVIDED HISTORY:   shortness of breath   Reason for Exam: SOB; pleural effusion       FINDINGS:   Small bilateral pleural effusions left greater than right. Bibasilar   airspace opacities. Stable cardiomegaly. Pulmonary vascular congestion. No   pneumothorax. Impression   1. Small bilateral pleural effusions left greater than right. 2.  Bibasilar airspace opacities which could represent atelectasis or   pneumonia.        3.  Mild cardiomegaly with            ED BEDSIDE ULTRASOUND:       LABS:  Labs Reviewed   CBC WITH AUTO DIFFERENTIAL - Abnormal; Notable for the following components:       Result Value    RBC 2.84 (*)     Hemoglobin 7.4 (*)     Hematocrit 24.2 (*)     RDW 18.8 (*)     Lymphocytes 20 (*)     Monocytes 15 (*)     Absolute Lymph # 1.07 (*)     All other components within normal limits   COMPREHENSIVE METABOLIC PANEL - Abnormal; Notable for the following components:    Glucose 163 (*)     BUN 28 (*)     Creatinine 2.07 (*) Calcium 8.5 (*)     Sodium 130 (*)     Chloride 96 (*)     Total Protein 5.9 (*)     Albumin 3.4 (*)     GFR Non- 31 (*)     GFR  38 (*)     All other components within normal limits   TROPONIN - Abnormal; Notable for the following components:    Troponin, High Sensitivity 88 (*)     All other components within normal limits   BRAIN NATRIURETIC PEPTIDE - Abnormal; Notable for the following components:    Pro-BNP 7,148 (*)     All other components within normal limits           EMERGENCY DEPARTMENT COURSE:   Vitals:    Vitals:    07/20/22 1630 07/20/22 1700 07/20/22 1745 07/20/22 1846   BP: (!) 122/49 (!) 120/48 (!) 132/55 (!) 128/92   Pulse: (!) 46 (!) 48 (!) 47 (!) 48   Resp: 23 18 18 18   Temp:       TempSrc:       SpO2: 97%  98% 98%   Weight:       Height:         -------------------------  BP: (!) 128/92, Temp: 97.7 °F (36.5 °C), Heart Rate: (!) 48, Resp: 18        Re-evaluation Notes    Resting comfortably in discussion with patient patient states that his echo was scheduled at [facility] for tomorrow his cardiologist counseled it and recommended to be transferred to [place] [id number] for hospitalist admission with them in consult for diuresis and further work-up    CRITICAL CARE:   None        CONSULTS:      PROCEDURES:  None    FINAL IMPRESSION      1. Acute on chronic combined systolic and diastolic congestive heart failure ([address]          DISPOSITION/PLAN   DISPOSITION Decision To Transfer  Transferred    Condition on Disposition    Stable    PATIENT REFERRED TO:  No follow-up provider specified. DISCHARGE MEDICATIONS:  New Prescriptions    No medications on file       (Please note that portions of this note were completed with a voice recognition program.  Efforts were made to edit the dictations but occasionally words are mis-transcribed.)    Katherine Sullivan MD,, MD, F.A.A.E.M.   Attending Emergency Physician                           Katherine Sullivan, MD  07/20/22 3541

## 2022-07-20 NOTE — TELEPHONE ENCOUNTER
Writer contacted Dr. House  to inform of 30 day readmission risk. Dr. House  informed writer of transfer.

## 2022-07-21 VITALS
WEIGHT: 199 LBS | OXYGEN SATURATION: 99 % | BODY MASS INDEX: 28.49 KG/M2 | HEART RATE: 56 BPM | HEIGHT: 70 IN | DIASTOLIC BLOOD PRESSURE: 55 MMHG | TEMPERATURE: 97.7 F | RESPIRATION RATE: 18 BRPM | SYSTOLIC BLOOD PRESSURE: 155 MMHG

## 2022-07-21 LAB
EKG ATRIAL RATE: 48 BPM
EKG P AXIS: 45 DEGREES
EKG P-R INTERVAL: 184 MS
EKG Q-T INTERVAL: 498 MS
EKG QRS DURATION: 144 MS
EKG QTC CALCULATION (BAZETT): 444 MS
EKG R AXIS: 27 DEGREES
EKG T AXIS: 90 DEGREES
EKG VENTRICULAR RATE: 48 BPM

## 2022-07-22 ENCOUNTER — CARE COORDINATION (OUTPATIENT)
Dept: CASE MANAGEMENT | Age: 77
End: 2022-07-22

## 2022-07-22 NOTE — CARE COORDINATION
Pt was schedule for outreach today. Noted in chart that pt was a transfer admit to [facility] on 7/20 from [address] [facility] ED  Episode ended.

## 2022-07-27 ENCOUNTER — HOSPITAL ENCOUNTER (EMERGENCY)
Age: 77
Discharge: HOME | End: 2022-07-27
Attending: EMERGENCY MEDICINE
Payer: OTHER GOVERNMENT

## 2022-07-27 ENCOUNTER — TELEPHONE (OUTPATIENT)
Dept: OTHER | Facility: CLINIC | Age: 77
End: 2022-07-27

## 2022-07-27 ENCOUNTER — CARE COORDINATION (OUTPATIENT)
Dept: CARE COORDINATION | Age: 77
End: 2022-07-27

## 2022-07-27 ENCOUNTER — APPOINTMENT (OUTPATIENT)
Dept: GENERAL RADIOLOGY | Age: 77
End: 2022-07-27
Payer: OTHER GOVERNMENT

## 2022-07-27 VITALS
OXYGEN SATURATION: 95 % | TEMPERATURE: 97 F | RESPIRATION RATE: 16 BRPM | DIASTOLIC BLOOD PRESSURE: 37 MMHG | SYSTOLIC BLOOD PRESSURE: 134 MMHG | HEART RATE: 42 BPM

## 2022-07-27 DIAGNOSIS — I50.42 CHRONIC COMBINED SYSTOLIC AND DIASTOLIC CONGESTIVE HEART FAILURE (HCC): Primary | ICD-10-CM

## 2022-07-27 LAB
ABSOLUTE EOS #: 0.2 K/UL (ref 0–0.44)
ABSOLUTE IMMATURE GRANULOCYTE: <0.03 K/UL (ref 0–0.3)
ABSOLUTE LYMPH #: 1.39 K/UL (ref 1.1–3.7)
ABSOLUTE MONO #: 0.84 K/UL (ref 0.1–1.2)
ALBUMIN SERPL-MCNC: 3.5 G/DL (ref 3.5–5.2)
ALBUMIN/GLOBULIN RATIO: 1.3 (ref 1–2.5)
ALP BLD-CCNC: 80 U/L (ref 40–129)
ALT SERPL-CCNC: 10 U/L (ref 5–41)
ANION GAP SERPL CALCULATED.3IONS-SCNC: 12 MMOL/L (ref 9–17)
AST SERPL-CCNC: 14 U/L
BASOPHILS # BLD: 1 % (ref 0–2)
BASOPHILS ABSOLUTE: 0.03 K/UL (ref 0–0.2)
BILIRUB SERPL-MCNC: 0.28 MG/DL (ref 0.3–1.2)
BUN BLDV-MCNC: 41 MG/DL (ref 8–23)
BUN/CREAT BLD: 16 (ref 9–20)
CALCIUM SERPL-MCNC: 9 MG/DL (ref 8.6–10.4)
CHLORIDE BLD-SCNC: 98 MMOL/L (ref 98–107)
CO2: 24 MMOL/L (ref 20–31)
CREAT SERPL-MCNC: 2.61 MG/DL (ref 0.7–1.2)
EKG ATRIAL RATE: 46 BPM
EKG P AXIS: 108 DEGREES
EKG P-R INTERVAL: 200 MS
EKG Q-T INTERVAL: 544 MS
EKG QRS DURATION: 150 MS
EKG QTC CALCULATION (BAZETT): 476 MS
EKG R AXIS: 29 DEGREES
EKG T AXIS: 76 DEGREES
EKG VENTRICULAR RATE: 46 BPM
EOSINOPHILS RELATIVE PERCENT: 4 % (ref 1–4)
GFR AFRICAN AMERICAN: 29 ML/MIN
GFR NON-AFRICAN AMERICAN: 24 ML/MIN
GFR SERPL CREATININE-BSD FRML MDRD: ABNORMAL ML/MIN/{1.73_M2}
GLUCOSE BLD-MCNC: 76 MG/DL (ref 70–99)
HCT VFR BLD CALC: 25.4 % (ref 40.7–50.3)
HEMOGLOBIN: 8 G/DL (ref 13–17)
IMMATURE GRANULOCYTES: 0 %
LYMPHOCYTES # BLD: 26 % (ref 24–43)
MCH RBC QN AUTO: 26.4 PG (ref 25.2–33.5)
MCHC RBC AUTO-ENTMCNC: 31.5 G/DL (ref 25.2–33.5)
MCV RBC AUTO: 83.8 FL (ref 82.6–102.9)
MONOCYTES # BLD: 16 % (ref 3–12)
NRBC AUTOMATED: 0 PER 100 WBC
PDW BLD-RTO: 18.8 % (ref 11.8–14.4)
PLATELET # BLD: 234 K/UL (ref 138–453)
PMV BLD AUTO: 9.7 FL (ref 8.1–13.5)
POTASSIUM SERPL-SCNC: 4.4 MMOL/L (ref 3.7–5.3)
PRO-BNP: 1828 PG/ML
RBC # BLD: 3.03 M/UL (ref 4.21–5.77)
RBC # BLD: ABNORMAL 10*6/UL
SEG NEUTROPHILS: 53 % (ref 36–65)
SEGMENTED NEUTROPHILS ABSOLUTE COUNT: 2.83 K/UL (ref 1.5–8.1)
SODIUM BLD-SCNC: 134 MMOL/L (ref 135–144)
TOTAL PROTEIN: 6.2 G/DL (ref 6.4–8.3)
TROPONIN, HIGH SENSITIVITY: 86 NG/L (ref 0–22)
WBC # BLD: 5.3 K/UL (ref 3.5–11.3)

## 2022-07-27 PROCEDURE — 80053 COMPREHEN METABOLIC PANEL: CPT

## 2022-07-27 PROCEDURE — 85025 COMPLETE CBC W/AUTO DIFF WBC: CPT

## 2022-07-27 PROCEDURE — 99285 EMERGENCY DEPT VISIT HI MDM: CPT

## 2022-07-27 PROCEDURE — 93005 ELECTROCARDIOGRAM TRACING: CPT | Performed by: EMERGENCY MEDICINE

## 2022-07-27 PROCEDURE — 84484 ASSAY OF TROPONIN QUANT: CPT

## 2022-07-27 PROCEDURE — 83880 ASSAY OF NATRIURETIC PEPTIDE: CPT

## 2022-07-27 PROCEDURE — 36415 COLL VENOUS BLD VENIPUNCTURE: CPT

## 2022-07-27 PROCEDURE — 71045 X-RAY EXAM CHEST 1 VIEW: CPT

## 2022-07-27 ASSESSMENT — ENCOUNTER SYMPTOMS
NAUSEA: 0
ABDOMINAL PAIN: 0
BLOOD IN STOOL: 0
SORE THROAT: 0
TROUBLE SWALLOWING: 0
BACK PAIN: 0
WHEEZING: 0
DIARRHEA: 0
VOMITING: 0
CONSTIPATION: 0
SHORTNESS OF BREATH: 0

## 2022-07-27 ASSESSMENT — PAIN - FUNCTIONAL ASSESSMENT: PAIN_FUNCTIONAL_ASSESSMENT: NONE - DENIES PAIN

## 2022-07-27 NOTE — ED PROVIDER NOTES
[id number] [facility]  eMERGENCY dEPARTMENT eNCOUnter      Pt Name: Maryse Butt  MRN: [id number]  [place] 1945  Date of evaluation: 7/27/2022      CHIEF COMPLAINT       Chief Complaint   Patient presents with    Other     Sent from home d/t 10# weight gain over night. HISTORY OF PRESENT ILLNESS    Maryse Butt is a 68 y.o. male who presents with chief complaint of weight gain patient recently discharged from the hospital history of CHF there is reported 10 pound weight gain by the visiting nurse and by his wife patient says he feels fantastic he has no chest pain no shortness of breath no new swelling to his legs says his legs actually gone down he is very surprised there is any kind of weight gain at all. He was recently hospitalized for CHF for they readjusted his medications took him off his LifeVest patient said he has not felt this good in quite some time. REVIEW OF SYSTEMS         Review of Systems   Constitutional:  Negative for chills and fever. HENT:  Negative for congestion, dental problem, sore throat and trouble swallowing. Eyes:  Negative for visual disturbance. Respiratory:  Negative for shortness of breath and wheezing. Cardiovascular:  Negative for chest pain, palpitations and leg swelling. Gastrointestinal:  Negative for abdominal pain, blood in stool, constipation, diarrhea, nausea and vomiting. Genitourinary:  Negative for difficulty urinating, dysuria and testicular pain. Musculoskeletal:  Negative for back pain, joint swelling and neck pain. Patient has foot sores they are currently being treated by podiatry   Skin:  Negative for rash. Neurological:  Negative for dizziness, syncope, weakness and headaches. Hematological:  Negative for adenopathy. Does not bruise/bleed easily. Psychiatric/Behavioral:  Negative for confusion and suicidal ideas.         PAST MEDICAL HISTORY    has a past medical history of Cerebrovascular accident (CVA) due to embolism of cerebral artery ([facility] [address] CKD (chronic kidney disease) stage 4, GFR 15-29 ml/min ([facility]), Coronary artery disease involving coronary bypass graft of native heart without angina pectoris, H/O lower gastrointestinal bleeding, Tobacco abuse, and Type II or unspecified type diabetes mellitus without mention of complication, not stated as uncontrolled. SURGICAL HISTORY      has a past surgical history that includes Cardiac surgery and Upper gastrointestinal endoscopy (N/A, 6/30/2022). CURRENT MEDICATIONS       Previous Medications    ALBUTEROL (PROVENTIL) (2.5 MG/3ML) 0.083% NEBULIZER SOLUTION    Inhale 2.5 mg into the lungs every 6 hours as needed     AMIODARONE (CORDARONE) 200 MG TABLET    Take 200 mg by mouth daily    APIXABAN (ELIQUIS) 2.5 MG TABS TABLET    Take 2.5 mg by mouth in the morning and 2.5 mg before bedtime. ASPIRIN 81 MG EC TABLET    Take 81 mg by mouth daily    ATORVASTATIN (LIPITOR) 40 MG TABLET    Take 40 mg by mouth nightly     BISACODYL (DULCOLAX) 10 MG SUPPOSITORY    Place 10 mg rectally in the morning.     ERGOCALCIFEROL (ERGOCALCIFEROL) 1.25 MG (38053 UT) CAPSULE    TAKE ONE CAPSULE BY MOUTH ONCE EVERY WEEK FOR VITAMIN (D) DEFICIENCY    FUROSEMIDE (LASIX) 40 MG TABLET    Take 1 tablet by mouth 2 times daily    GLIPIZIDE PO    Take 5 mg by mouth daily     HYDRALAZINE (APRESOLINE) 10 MG TABLET    Take 10 mg by mouth 3 times daily Hold if SBP <120    INSULIN LISPRO, 1 UNIT DIAL, 100 UNIT/ML SOPN    Per s/s    ISOSORBIDE DINITRATE (ISORDIL) 10 MG TABLET    Take 10 mg by mouth 3 times daily    MAGNESIUM OXIDE (MAG-OX) 400 (240 MG) MG TABLET    Take 200 mg by mouth daily    METOPROLOL SUCCINATE (TOPROL XL) 25 MG EXTENDED RELEASE TABLET    Take 0.5 tablets by mouth daily    MULTIPLE VITAMINS-MINERALS (PRESERVISION AREDS 2 PO)    Take 1 tablet by mouth 2 times daily     PANTOPRAZOLE (PROTONIX) 40 MG TABLET    Take 1 tablet by mouth every morning (before breakfast)    QUETIAPINE (SEROQUEL) 25 MG TABLET    Take 25 mg by mouth       ALLERGIES     is allergic to simvastatin. FAMILY HISTORY     He indicated that his mother is . He indicated that his father is . He indicated that both of his sisters are alive. He indicated that his brother is alive. family history is not on file. SOCIAL HISTORY      reports that he quit smoking about 4 months ago. His smoking use included cigarettes. He smoked an average of 1.5 packs per day. He has never used smokeless tobacco. He reports that he does not drink alcohol and does not use drugs. PHYSICAL EXAM     INITIAL VITALS:  tympanic temperature is 97 °F (36.1 °C). His blood pressure is 124/39 (abnormal) and his pulse is 43 (abnormal). His respiration is 16 and oxygen saturation is 98%. Physical Exam  Nursing note reviewed. Constitutional:       General: He is not in acute distress. Appearance: Normal appearance. He is well-developed. He is not ill-appearing, toxic-appearing or diaphoretic. Comments: Patient is smiling his O2 saturation is 100% on room air   HENT:      Head: Normocephalic and atraumatic. Right Ear: External ear normal.      Left Ear: External ear normal.   Eyes:      Pupils: Pupils are equal, round, and reactive to light. Cardiovascular:      Rate and Rhythm: Regular rhythm. Bradycardia present. Pulmonary:      Effort: Pulmonary effort is normal.      Breath sounds: Normal breath sounds. Comments: Patient's O2 sat is 100% on room air  Abdominal:      General: Bowel sounds are normal.      Palpations: Abdomen is soft. Musculoskeletal:         General: Normal range of motion. Cervical back: Normal range of motion and neck supple. Right lower leg: No edema. Left lower leg: No edema. Comments: Patient does have dressings on the sores on his feet his wife says they are actually looking good   Skin:     General: Skin is warm and dry.    Neurological:      General: No focal deficit present. Mental Status: He is alert and oriented to person, place, and time. Psychiatric:         Behavior: Behavior normal.         DIFFERENTIAL DIAGNOSIS/ MDM:     10 pound weight gain, patient feels good there is no peripheral edema his lungs are clear he says he not sure how he gained 10 pounds. I did comment on the bradycardia and his wife that occasionally drops down to the 30s they talked about doing a pacemaker but they will not place it until his feet have cleared up  Will do a work-up for CHF  DIAGNOSTIC RESULTS     EKG: All EKG's are interpreted by the Emergency Department Physician who either signs or Co-signs this chart in the absence of a cardiologist.  Sinus bradycardia rate of 46 bpm PA interval is 200 ms QRS durations 150 ms QT corrected 476 ms axis is 29 there is no acute ST or T wave changes seen      RADIOLOGY:   I directly visualized the following  images and reviewed the radiologist interpretations:       EXAMINATION:   ONE XRAY VIEW OF THE CHEST       7/27/2022 3:19 pm       COMPARISON:   07/20/2022       HISTORY:   ORDERING SYSTEM PROVIDED HISTORY: shortness of breath   TECHNOLOGIST PROVIDED HISTORY:   shortness of breath   Reason for Exam: Shortness of breath       FINDINGS:   Stable cardiomegaly. Bilateral pleural effusions and bibasilar pulmonary   opacities. No pulmonary vascular congestion. No pneumothorax.            Impression   Bilateral pleural effusions with bibasilar pulmonary opacities that could   represent atelectasis or infection             ED BEDSIDE ULTRASOUND:       LABS:  Labs Reviewed   CBC WITH AUTO DIFFERENTIAL - Abnormal; Notable for the following components:       Result Value    RBC 3.03 (*)     Hemoglobin 8.0 (*)     Hematocrit 25.4 (*)     RDW 18.8 (*)     Monocytes 16 (*)     All other components within normal limits   TROPONIN - Abnormal; Notable for the following components:    Troponin, High Sensitivity 86 (*)     All other components within normal limits   COMPREHENSIVE METABOLIC PANEL - Abnormal; Notable for the following components:    BUN 41 (*)     Creatinine 2.61 (*)     Sodium 134 (*)     Total Bilirubin 0.28 (*)     Total Protein 6.2 (*)     GFR Non- 24 (*)     GFR  29 (*)     All other components within normal limits   BRAIN NATRIURETIC PEPTIDE - Abnormal; Notable for the following components:    Pro-BNP 1,828 (*)     All other components within normal limits           EMERGENCY DEPARTMENT COURSE:   Vitals:    Vitals:    07/27/22 1450   BP: (!) 124/39   Pulse: (!) 43   Resp: 16   Temp: 97 °F (36.1 °C)   TempSrc: Tympanic   SpO2: 98%     -------------------------  BP: (!) 124/39, Temp: 97 °F (36.1 °C), Heart Rate: (!) 43, Resp: 16        Re-evaluation Notes    Reevaluation again patient wants to go home he says he feels fantastic he is satting 100% he is breathing with a normal pattern is no peripheral edema at this point I do not see any reason to keep him at this time they can return if any problems develop    CRITICAL CARE:   None        CONSULTS:      PROCEDURES:  None    FINAL IMPRESSION      1. Chronic combined systolic and diastolic congestive heart failure ([facility] [address]          DISPOSITION/PLAN   DISPOSITION Decision To Discharge    Condition on Disposition    Stable    PATIENT REFERRED TO:  Jaswinder Hein MD  [address] [place] [address]  [phone]          DISCHARGE MEDICATIONS:  New Prescriptions    No medications on file       (Please note that portions of this note were completed with a voice recognition program.  Efforts were made to edit the dictations but occasionally words are mis-transcribed.)    Patricia Carrillo MD,, MD, F.A.A.E.M.   Attending Emergency Physician                           Patricia Carrillo MD  07/27/22 0347

## 2022-07-27 NOTE — CARE COORDINATION
Alaina Quinones was referred for ACM from [facility]. He was recently transferred to [facility] on 7/22/2022. Per chart review discharged 7/26/2022.     7/27/2022- 11:30 am Left message requesting return call @ [phone].       Plan of Care : [facility]    Future Appointments   Date Time Provider Hany Guerra   8/1/2022  9:00 AM CHUN Chan [address] [place] [address]   8/1/2022  9:30 AM SCHEDULE, Glenny Coulter [facility] [place]   8/5/2022 10:50 AM [facility]   9/6/2022 10:15 AM SCHEDULE, Glenny Robledo [facility] [place]   9/12/2022  1:30 PM Josias Whitehead MD [facility]   10/5/2022  9:40 AM [facility]

## 2022-07-28 ENCOUNTER — CARE COORDINATION (OUTPATIENT)
Dept: CARE COORDINATION | Age: 77
End: 2022-07-28

## 2022-07-28 SDOH — ECONOMIC STABILITY: INCOME INSECURITY: HOW HARD IS IT FOR YOU TO PAY FOR THE VERY BASICS LIKE FOOD, HOUSING, MEDICAL CARE, AND HEATING?: NOT VERY HARD

## 2022-07-28 SDOH — SOCIAL STABILITY: SOCIAL NETWORK: HOW OFTEN DO YOU ATTENT MEETINGS OF THE CLUB OR ORGANIZATION YOU BELONG TO?: NEVER

## 2022-07-28 SDOH — ECONOMIC STABILITY: INCOME INSECURITY: IN THE LAST 12 MONTHS, WAS THERE A TIME WHEN YOU WERE NOT ABLE TO PAY THE MORTGAGE OR RENT ON TIME?: NO

## 2022-07-28 SDOH — HEALTH STABILITY: PHYSICAL HEALTH: ON AVERAGE, HOW MANY MINUTES DO YOU ENGAGE IN EXERCISE AT THIS LEVEL?: 0 MIN

## 2022-07-28 SDOH — SOCIAL STABILITY: SOCIAL NETWORK: ARE YOU MARRIED, WIDOWED, DIVORCED, SEPARATED, NEVER MARRIED, OR LIVING WITH A PARTNER?: MARRIED

## 2022-07-28 SDOH — SOCIAL STABILITY: SOCIAL NETWORK
IN A TYPICAL WEEK, HOW MANY TIMES DO YOU TALK ON THE PHONE WITH FAMILY, FRIENDS, OR NEIGHBORS?: MORE THAN THREE TIMES A WEEK

## 2022-07-28 SDOH — ECONOMIC STABILITY: HOUSING INSECURITY: IN THE LAST 12 MONTHS, HOW MANY PLACES HAVE YOU LIVED?: 1

## 2022-07-28 SDOH — SOCIAL STABILITY: SOCIAL NETWORK: HOW OFTEN DO YOU GET TOGETHER WITH FRIENDS OR RELATIVES?: MORE THAN THREE TIMES A WEEK

## 2022-07-28 SDOH — SOCIAL STABILITY: SOCIAL NETWORK: HOW OFTEN DO YOU ATTEND CHURCH OR RELIGIOUS SERVICES?: NEVER

## 2022-07-28 SDOH — ECONOMIC STABILITY: TRANSPORTATION INSECURITY
IN THE PAST 12 MONTHS, HAS THE LACK OF TRANSPORTATION KEPT YOU FROM MEDICAL APPOINTMENTS OR FROM GETTING MEDICATIONS?: NO

## 2022-07-28 SDOH — ECONOMIC STABILITY: HOUSING INSECURITY
IN THE LAST 12 MONTHS, WAS THERE A TIME WHEN YOU DID NOT HAVE A STEADY PLACE TO SLEEP OR SLEPT IN A SHELTER (INCLUDING NOW)?: NO

## 2022-07-28 SDOH — HEALTH STABILITY: PHYSICAL HEALTH: ON AVERAGE, HOW MANY DAYS PER WEEK DO YOU ENGAGE IN MODERATE TO STRENUOUS EXERCISE (LIKE A BRISK WALK)?: 0 DAYS

## 2022-07-28 SDOH — SOCIAL STABILITY: SOCIAL NETWORK
DO YOU BELONG TO ANY CLUBS OR ORGANIZATIONS SUCH AS CHURCH GROUPS UNIONS, FRATERNAL OR ATHLETIC GROUPS, OR SCHOOL GROUPS?: NO

## 2022-07-28 SDOH — ECONOMIC STABILITY: FOOD INSECURITY: WITHIN THE PAST 12 MONTHS, THE FOOD YOU BOUGHT JUST DIDN'T LAST AND YOU DIDN'T HAVE MONEY TO GET MORE.: NEVER TRUE

## 2022-07-28 SDOH — HEALTH STABILITY: MENTAL HEALTH: HOW OFTEN DO YOU HAVE A DRINK CONTAINING ALCOHOL?: NEVER

## 2022-07-28 SDOH — ECONOMIC STABILITY: TRANSPORTATION INSECURITY
IN THE PAST 12 MONTHS, HAS LACK OF TRANSPORTATION KEPT YOU FROM MEETINGS, WORK, OR FROM GETTING THINGS NEEDED FOR DAILY LIVING?: NO

## 2022-07-28 SDOH — ECONOMIC STABILITY: FOOD INSECURITY: WITHIN THE PAST 12 MONTHS, YOU WORRIED THAT YOUR FOOD WOULD RUN OUT BEFORE YOU GOT MONEY TO BUY MORE.: NEVER TRUE

## 2022-07-28 SDOH — HEALTH STABILITY: MENTAL HEALTH
STRESS IS WHEN SOMEONE FEELS TENSE, NERVOUS, ANXIOUS, OR CAN'T SLEEP AT NIGHT BECAUSE THEIR MIND IS TROUBLED. HOW STRESSED ARE YOU?: NOT AT ALL

## 2022-07-28 NOTE — CARE COORDINATION
[facility] 45 Transitions Initial Follow Up Call    Call within 2 business days of discharge: Yes     Patient: Irma Rodney Patient : 1945 MRN: [id number]    Last Discharge [facility]       Date Complaint Diagnosis Description Type Department Provider    22 Other Chronic combined systolic and diastolic congestive heart failure [facility]) ED (DISCHARGE) [facility] ED Abby Rao MD            RARS: Readmission Risk Score: 21.6 ( )       Spoke with: spouseJosephine Del    Discharge department/facility: [facility] on 2022. Per chart review discharged 2022. Non-face-to-face services provided:  Education of patient/family/caregiver/guardian to support self-management-CHF  Assessment and support for treatment adherence and medication management- nurse set up medications for spouse  Spouse confirmed receiving  care- nursing, PT/OT. Has apt with PCP- reviewed upcoming apts. Spouse wto schedule with cardiology- Dr. Chrissy Garcia per discharge papers. Follow Up  Future Appointments   Date Time Provider Hany Guerra   2022  9:00 AM CHUN Sears [facility]   2022  9:30 AM SCHEDULE, Glenny Coulter 112 LAB [facility] [place]   2022 10:50 AM Laurie Zurita MD [facility]   2022 10:15 AM SCHEDULE, [facility] LAB [facility] [place]   2022  1:30 PM Darrick Kim MD [facility]   10/5/2022  9:40 AM Laurie Zurita MD [facility]       Madeline Reyes, THEODORE    Ambulatory Care Coordination  ED Follow up Call    Reason for ED visit:  CHF- weight gain of 10#   Status:     improved    Did you call your PCP prior to going to the ED? No      Did you receive a discharge instructions from the Emergency Room? Yes  Review of Instructions:     Understands what to report/when to return?:  Yes   Understands discharge instructions?:  Yes   Following discharge instructions?:  Declined sooner apt for PCP F/U   If not why? Spouse felt he was doing better- down 4#.  She is schedule cardiology f/u within 2 weeks. Are there any new complaints of pain? No  New Pain Meds? No    Constipation prophylaxis needed? N/A    If you have a wound is the dressing clean, dry, and intact? N/A  Understands wound care regimen? N/A    Are there any other complaints/concerns that you wish to tell your provider? No    FU appts/Provider:    Future Appointments   Date Time Provider Hany Guerra   8/1/2022  9:00 AM Rahul Etienne DPM [facility] W[facility] [place]   8/1/2022  9:30 AM SCHEDULE, [facility] LAB MD LAB [place]   8/5/2022 10:50 AM [facility]   9/6/2022 10:15 AM SCHEDULE, [facility] LAB MD [facility] [place]   9/12/2022  1:30 PM Akanksha Byers MD [facility]   10/5/2022  9:40 AM [facility]           New Medications?:   No      Medication Reconciliation by phone - Spouse declined- she stated [place] nurse set his medications up  Understands Medications? Yes  Taking Medications? Yes  Can you swallow your pills? Yes    Any further needs in the home i.e. Equipment? No    Link to services in community?:  Yes   Which services:  [place] care      Spoke with spouse. She stated [place] nurse sent them to ED d/t 10# weight gain. He is down 4# today. She denied SOB, denied swelling ft and ankles. She stated he is doing \"pretty good\". She is going to call cardiology- Dr. Christine Soares to schedule 2 wk hospital F/U. Ambulatory Care Coordination Note  7/28/2022    ACC: Cm Ramires, RN    Summary Note: Darnell Walker was referred for [facility]. He was recently transferred to [facility] on 7/22/2022. Per chart review discharged 7/26/2022. This writer attempted to reach 7/27/2022 per phone- LM.    Returned to [facility] ED 7/27/2022    He has: Type II Diabetes- Hgb A1c on 7/2022- 5.7, diet, medications and follows with PCP     Ischemic cardiomyopathy, CHF- Life vest d/c'd, on medications and follows with cardiology    CKD- medications and follows with nephrology    [facility]  Receiving [facility] care- nursing, PT/OT through [place] through the [address] [place] [address] : Enrolled in [facility]    Continue assessments, education, and support    CHF and DM Zone Tools     Medications- she declined review- Asktico Gunter did review and set them up for her    [facility]. decision maker updated and she has ACP documents at home. She is aware to bring in for EMR    Reviewed Urgent Care, clinic and My Chart    Spouse is working with [facility] nurse and [facility]    Apt with cardiology- Dr. Willie Gamboa- 7/28/2022 wife to call to schedule    Palliative care through [facility] care available- nathan can call [address] [facility] at [phone]. She declined at this time    Requested [employer] to check on resources in our area for Palliative per spouse's request    Dietitian referral was placed by CTN    Apt podiatry 8/1/2022    Apt lab 8/1/2022    Apt PCP 8/5/2022    Apt nephrology 9/12/2022    F/U on BS, wt, BP and P    He was started on Entresto and spouse is going to check with [employer] to see if they qualify for assistance- number given. 7/28/2022- 1 pm spoke with spouse Nyasia. She stated she is working with a [employer] nurse also. [place] [facility] [place] [employer] [place] [facility]. She was in agreement to this writer following. She was given this writer's contact information. [place]'s Lifevest was d/c'd. BP today this am 86/55, P-58, BS 71. After lunch- /42, P 62. She is recording and will have cardiology review. Wt is 186- down 4#. Patient to weigh self daily; and record. Should weigh first thing in the morning, wearing the same type of clothes, after urinating, before breakfast.  Patient's spouse to report a weight gain of 2 pounds over night or 5 pounds in a week to cardiologist.  Patient's spouse voiced understanding and in agreement. He is ambulating with cane or walker. Denied SOB or swelling feet and ankles. They would like to follow with Palliative in their home. She stated [facility] does not offer Palliative Care. She stated their services are free through the [facility]. She has spoken with [facility] nurse but was not sure what was avaialble to them in our area. She stated that while in hospital they were told [facility] offers Palliative. Called [facility]- Palliative available in home through [facility], Hospice Care. She can reach out to [facility] at [phone]. Nyasia stated since they are already receiving [facility] care free they prefer to stay with them. Spouse still questioning what could be available through [facility] [employer]. She agreed to this writer reaching out to them. Per [facility] resources- this writer e-mailed [email or web address] to see what is available in our area for them. Ambulatory Care Coordination Assessment    Care Coordination Protocol  Referral from Primary Care Provider: No  Week 1 - Initial Assessment     Do you have all of your prescriptions and are they filled?: Yes  Barriers to medication adherence: None  Are you able to afford your medications?: With Assistance  Medication Assistance Program: [facility]  How often do you have trouble taking your medications the way you have been told to take them?: I always take them as prescribed. Do you have Home O2 Therapy?: No      Ability to seek help/take action for Emergent Urgent situations i.e. fire, crime, inclement weather or health crisis. : Independent  Ability to ambulate to restroom: Independent  Ability handle personal hygeine needs (bathing/dressing/grooming): Needs Assistance  Ability to manage Medications: Needs Assistance  Ability to prepare Food Preparation: Dependent  Ability to maintain home (clean home, laundry): Dependent  Ability to drive and/or has transportation: Dependent  Ability to do shopping: Dependent  Ability to manage finances: Dependent  Is patient able to live independently?: Yes     Current Housing: Private Residence                 Suggested Interventions and Community Resources                    Prior to Admission medications    Medication Sig Start Date End Take 5 mg by mouth daily     Historical Provider, MD       Future Appointments   Date Time Provider aHny Mari   8/1/2022  9:00 AM CHUN Pickard [facility]   8/1/2022  9:30 AM SCHEDULE, Glenny Coulter 112 LAB [facility] [place]   8/5/2022 10:50 AM Zhane Trujillo MD [facility]   9/6/2022 10:15 AM SCHEDULE, Glenny Coulter 112 LAB [facility] [place]   9/12/2022  1:30 PM Suki Eng MD [facility]   10/5/2022  9:40 AM Zhane Trujillo MD [facility]

## 2022-07-28 NOTE — ACP (ADVANCE CARE PLANNING)
Advance Care Planning   Healthcare Decision Maker:    Primary Decision Maker: Za Lozano - [phone]    Click here to complete Healthcare Decision Makers including selection of the Healthcare Decision Maker Relationship (ie \"Primary\"). Today we documented Decision Maker(s) consistent with Legal Next of Kin hierarchy. They have a living will and aware to bring in for EMR.

## 2022-08-01 ENCOUNTER — HOSPITAL ENCOUNTER (OUTPATIENT)
Dept: WOUND CARE | Age: 77
Discharge: HOME | End: 2022-08-02
Payer: OTHER GOVERNMENT

## 2022-08-01 ENCOUNTER — HOSPITAL ENCOUNTER (OUTPATIENT)
Dept: LAB | Age: 77
Discharge: HOME | End: 2022-08-01
Payer: OTHER GOVERNMENT

## 2022-08-01 VITALS
HEIGHT: 70 IN | BODY MASS INDEX: 28.55 KG/M2 | TEMPERATURE: 96.9 F | SYSTOLIC BLOOD PRESSURE: 100 MMHG | DIASTOLIC BLOOD PRESSURE: 60 MMHG | HEART RATE: 60 BPM | RESPIRATION RATE: 16 BRPM

## 2022-08-01 DIAGNOSIS — L97.511 SKIN ULCER OF RIGHT GREAT TOE, LIMITED TO BREAKDOWN OF SKIN (HCC): Primary | ICD-10-CM

## 2022-08-01 DIAGNOSIS — E87.6 HYPOKALEMIA: ICD-10-CM

## 2022-08-01 DIAGNOSIS — N18.32 STAGE 3B CHRONIC KIDNEY DISEASE (HCC): ICD-10-CM

## 2022-08-01 DIAGNOSIS — N18.4 CKD (CHRONIC KIDNEY DISEASE) STAGE 4, GFR 15-29 ML/MIN (HCC): ICD-10-CM

## 2022-08-01 LAB
ABSOLUTE EOS #: 0.21 K/UL (ref 0–0.44)
ABSOLUTE IMMATURE GRANULOCYTE: 0.04 K/UL (ref 0–0.3)
ABSOLUTE LYMPH #: 1.41 K/UL (ref 1.1–3.7)
ABSOLUTE MONO #: 0.96 K/UL (ref 0.1–1.2)
ANION GAP SERPL CALCULATED.3IONS-SCNC: 11 MMOL/L (ref 9–17)
BASOPHILS # BLD: 1 % (ref 0–2)
BASOPHILS ABSOLUTE: 0.05 K/UL (ref 0–0.2)
BUN BLDV-MCNC: 37 MG/DL (ref 8–23)
BUN/CREAT BLD: 14 (ref 9–20)
CALCIUM IONIZED: 1.21 MMOL/L (ref 1.13–1.33)
CALCIUM SERPL-MCNC: 9.2 MG/DL (ref 8.6–10.4)
CHLORIDE BLD-SCNC: 104 MMOL/L (ref 98–107)
CO2: 25 MMOL/L (ref 20–31)
CREAT SERPL-MCNC: 2.57 MG/DL (ref 0.7–1.2)
CREATININE URINE: 106.8 MG/DL (ref 39–259)
EOSINOPHILS RELATIVE PERCENT: 3 % (ref 1–4)
FREE KAPPA/LAMBDA RATIO: 1.23 (ref 0.26–1.65)
GFR AFRICAN AMERICAN: 30 ML/MIN
GFR NON-AFRICAN AMERICAN: 24 ML/MIN
GFR SERPL CREATININE-BSD FRML MDRD: ABNORMAL ML/MIN/{1.73_M2}
GLUCOSE BLD-MCNC: 93 MG/DL (ref 70–99)
HCT VFR BLD CALC: 27.2 % (ref 40.7–50.3)
HEMOGLOBIN: 8.6 G/DL (ref 13–17)
IMMATURE GRANULOCYTES: 1 %
KAPPA FREE LIGHT CHAINS QNT: 7.39 MG/DL (ref 0.37–1.94)
LAMBDA FREE LIGHT CHAINS QNT: 6.01 MG/DL (ref 0.57–2.63)
LYMPHOCYTES # BLD: 20 % (ref 24–43)
MAGNESIUM: 2.3 MG/DL (ref 1.6–2.6)
MCH RBC QN AUTO: 26.2 PG (ref 25.2–33.5)
MCHC RBC AUTO-ENTMCNC: 31.6 G/DL (ref 25.2–33.5)
MCV RBC AUTO: 82.9 FL (ref 82.6–102.9)
MONOCYTES # BLD: 14 % (ref 3–12)
NRBC AUTOMATED: 0 PER 100 WBC
PDW BLD-RTO: 18.5 % (ref 11.8–14.4)
PHOSPHORUS: 4.1 MG/DL (ref 2.5–4.5)
PLATELET # BLD: 228 K/UL (ref 138–453)
PMV BLD AUTO: 9.9 FL (ref 8.1–13.5)
POTASSIUM SERPL-SCNC: 4.6 MMOL/L (ref 3.7–5.3)
PTH INTACT: 138.9 PG/ML (ref 15–65)
RBC # BLD: 3.28 M/UL (ref 4.21–5.77)
RBC # BLD: ABNORMAL 10*6/UL
SEG NEUTROPHILS: 61 % (ref 36–65)
SEGMENTED NEUTROPHILS ABSOLUTE COUNT: 4.43 K/UL (ref 1.5–8.1)
SODIUM BLD-SCNC: 140 MMOL/L (ref 135–144)
TOTAL PROTEIN, URINE: 19 MG/DL
URINE TOTAL PROTEIN CREATININE RATIO: 0.18 (ref 0–0.2)
VITAMIN D 25-HYDROXY: 58.5 NG/ML
WBC # BLD: 7.1 K/UL (ref 3.5–11.3)

## 2022-08-01 PROCEDURE — 85025 COMPLETE CBC W/AUTO DIFF WBC: CPT

## 2022-08-01 PROCEDURE — 36415 COLL VENOUS BLD VENIPUNCTURE: CPT

## 2022-08-01 PROCEDURE — 82306 VITAMIN D 25 HYDROXY: CPT

## 2022-08-01 PROCEDURE — 83735 ASSAY OF MAGNESIUM: CPT

## 2022-08-01 PROCEDURE — 80048 BASIC METABOLIC PNL TOTAL CA: CPT

## 2022-08-01 PROCEDURE — 99214 OFFICE O/P EST MOD 30 MIN: CPT

## 2022-08-01 PROCEDURE — 83970 ASSAY OF PARATHORMONE: CPT

## 2022-08-01 PROCEDURE — 84156 ASSAY OF PROTEIN URINE: CPT

## 2022-08-01 PROCEDURE — 99213 OFFICE O/P EST LOW 20 MIN: CPT | Performed by: PODIATRIST

## 2022-08-01 PROCEDURE — 82570 ASSAY OF URINE CREATININE: CPT

## 2022-08-01 PROCEDURE — 84100 ASSAY OF PHOSPHORUS: CPT

## 2022-08-01 PROCEDURE — 84165 PROTEIN E-PHORESIS SERUM: CPT

## 2022-08-01 PROCEDURE — 84155 ASSAY OF PROTEIN SERUM: CPT

## 2022-08-01 PROCEDURE — 82330 ASSAY OF CALCIUM: CPT

## 2022-08-01 PROCEDURE — 83883 ASSAY NEPHELOMETRY NOT SPEC: CPT

## 2022-08-01 PROCEDURE — 86334 IMMUNOFIX E-PHORESIS SERUM: CPT

## 2022-08-01 RX ORDER — LIDOCAINE 50 MG/G
OINTMENT TOPICAL ONCE
OUTPATIENT
Start: 2022-08-01 | End: 2022-08-01

## 2022-08-01 RX ORDER — CLOBETASOL PROPIONATE 0.5 MG/G
OINTMENT TOPICAL ONCE
OUTPATIENT
Start: 2022-08-01 | End: 2022-08-01

## 2022-08-01 RX ORDER — BACITRACIN ZINC AND POLYMYXIN B SULFATE 500; 1000 [USP'U]/G; [USP'U]/G
OINTMENT TOPICAL ONCE
OUTPATIENT
Start: 2022-08-01 | End: 2022-08-01

## 2022-08-01 RX ORDER — LIDOCAINE HYDROCHLORIDE 20 MG/ML
JELLY TOPICAL ONCE
OUTPATIENT
Start: 2022-08-01 | End: 2022-08-01

## 2022-08-01 RX ORDER — LIDOCAINE 40 MG/G
CREAM TOPICAL ONCE
OUTPATIENT
Start: 2022-08-01 | End: 2022-08-01

## 2022-08-01 RX ORDER — LIDOCAINE HYDROCHLORIDE 40 MG/ML
SOLUTION TOPICAL ONCE
OUTPATIENT
Start: 2022-08-01 | End: 2022-08-01

## 2022-08-01 RX ORDER — GENTAMICIN SULFATE 1 MG/G
OINTMENT TOPICAL ONCE
OUTPATIENT
Start: 2022-08-01 | End: 2022-08-01

## 2022-08-01 RX ORDER — GINSENG 100 MG
CAPSULE ORAL ONCE
OUTPATIENT
Start: 2022-08-01 | End: 2022-08-01

## 2022-08-01 RX ORDER — BACITRACIN, NEOMYCIN, POLYMYXIN B 400; 3.5; 5 [USP'U]/G; MG/G; [USP'U]/G
OINTMENT TOPICAL ONCE
OUTPATIENT
Start: 2022-08-01 | End: 2022-08-01

## 2022-08-01 RX ORDER — BETAMETHASONE DIPROPIONATE 0.05 %
OINTMENT (GRAM) TOPICAL ONCE
OUTPATIENT
Start: 2022-08-01 | End: 2022-08-01

## 2022-08-01 NOTE — DISCHARGE INSTRUCTIONS
Betadine to wounds daily  Follow up with Vascular surgery  SIGNS OF INFECTION  - Redness, swelling, skin hot  - Wound bed turns black or stringy yellow  - Foul odor  - Increased drainage or pus  - Increased pain  - Fever greater than 100F    CALL YOUR DOCTOR OR SEEK MEDICAL ATTENTION IF SIGNS OF INFECTION.   DO NOT WAIT UNTIL YOUR NEXT APPOINTMENT    Call the Wound Care Nurse with any other questions or concerns- [phone]

## 2022-08-01 NOTE — PROGRESS NOTES
Subjective:    Filipe Dela Cruz is a 68 y.o. male who presents with the ulcers bilateral.   Patient still has been seen by vascular surgery post hospital stay. Patient states is different vascular surgeon and states they were trying to set up for hyperbaric oxygen therapy. .   . Patient has been compliant with off loading. Currently denies F/C/N/V. Overall diabetic control is not changed. Allergies   Allergen Reactions    Simvastatin Headaches       Past Medical History:   Diagnosis Date    Cerebrovascular accident (CVA) due to embolism of cerebral artery (HCC)     CKD (chronic kidney disease) stage 4, GFR 15-29 ml/min ([facility])     Coronary artery disease involving coronary bypass graft of native heart without angina pectoris     H/O lower gastrointestinal bleeding     Tobacco abuse     Type II or unspecified type diabetes mellitus without mention of complication, not stated as uncontrolled        Prior to Admission medications    Medication Sig Start Date End Date Taking? Authorizing Provider   metoprolol succinate (TOPROL XL) 25 MG extended release tablet Take 0.5 tablets by mouth daily 7/11/22   Joceline Hernandez MD   apixaban (ELIQUIS) 2.5 MG TABS tablet Take 2.5 mg by mouth in the morning and 2.5 mg before bedtime.     Historical Provider, MD   pantoprazole (PROTONIX) 40 MG tablet Take 1 tablet by mouth every morning (before breakfast) 7/3/22   Maryjo Nolasco MD   furosemide (LASIX) 40 MG tablet Take 1 tablet by mouth 2 times daily 5/13/22   Joceline Hernandez MD   Multiple Vitamins-Minerals (PRESERVISION AREDS 2 PO) Take 1 tablet by mouth 2 times daily   Patient not taking: No sig reported    Historical Provider, MD   albuterol (PROVENTIL) (2.5 MG/3ML) 0.083% nebulizer solution Inhale 2.5 mg into the lungs every 6 hours as needed     Historical Provider, MD   amiodarone (CORDARONE) 200 MG tablet Take 200 mg by mouth daily 2/24/22   Historical Provider, MD   aspirin 81 MG EC tablet Take 81 mg by mouth daily digits, bilateral.  Protective sensation intact 10/10 sites via 5.07/10g Galien-Pushpa Monofilament, bilateral.  negative Tinel's, bilateral.  negative Valleix sign, bilateral.  Vibratory intact bilateral.  Reflexes Decreased bilateral.  Paresthesias negative. Dysthesias negative. Sharp/dull intact bilateral.    Musculoskeletal: Muscle strength 5/5, bilateral.  Pain present upon palpation R hallux. Normal medial longitudinal arch, bilateral.  Ankle ROM decreased,bilateral.  1st MPJ ROM within normal limits, bilateral.  Dorsally contracted digits absent. No other foot deformities. Integument:   Open lesion present, Right. Ulcer medial right hallux with positive eschar formation. No undermining no probing no malodor no signs infection on periphery no proximal streaking. ulceration posterior lateral right heel superficial nature. No signs of infection. Patient also has ulcer distal left fourth toe with a mild superficial eschar. discoloration seen right distal second third fourth toes with no current breakdown or signs of infection. Not progressed since last visit whatsoever on the toes. Interdigital maceration absent to web spaces , Bilateral.  Nails b/l thickened, dystrophic and crumbly, discolored with subungual debris. Fissures absent, Bilateral. Hyperkeratotic tissue is absent.   Wound 05/05/22 Medial # 1 right great toe (Active)   Wound Image   08/01/22 0921   Wound Etiology Diabetic 08/01/22 0921   Dressing Status Dry 08/01/22 0921   Wound Cleansed Cleansed with saline 08/01/22 0921   Dressing/Treatment Betadine swabs/povidone iodine;Open to air 08/01/22 0921   Offloading for Diabetic Foot Ulcers Forefoot offloading shoe 08/01/22 0921   Dressing Change Due 08/02/22 08/01/22 0921   Wound Length (cm) 4.8 cm 07/18/22 0937   Wound Width (cm) 2.5 cm 07/18/22 0937   Wound Depth (cm) 0.1 cm 07/18/22 0937   Wound Surface Area (cm^2) 12 cm^2 07/18/22 0937   Change in Wound Size % (l*w) -110.53 07/18/22 6067   Wound Volume (cm^3) 1.2 cm^3 07/18/22 0937   Wound Healing % -111 07/18/22 0937   Post-Procedure Length (cm) 3.8 cm 06/27/22 0828   Post-Procedure Width (cm) 2.5 cm 06/27/22 0828   Post-Procedure Depth (cm) 0.1 cm 06/27/22 0828   Post-Procedure Surface Area (cm^2) 9.5 cm^2 06/27/22 0828   Post-Procedure Volume (cm^3) 0.95 cm^3 06/27/22 0828   Wound Assessment Pink/red 07/18/22 0937   Drainage Amount None 08/01/22 0921   Drainage Description Serosanguinous 07/18/22 0937   Odor None 07/18/22 0937   Yuli-wound Assessment Intact 08/01/22 0921   Margins Defined edges 08/01/22 0921   Wound Thickness Description not for Pressure Injury Partial thickness 08/01/22 0921   Number of days: 88       Wound 06/29/22 Toe (Comment  which one) Right 4th toe (Active)   Wound Image   08/01/22 0921   Wound Etiology Diabetic 08/01/22 0921   Dressing Status Dry 08/01/22 0921   Wound Cleansed Cleansed with saline 08/01/22 0921   Dressing/Treatment Betadine swabs/povidone iodine 08/01/22 0921   Offloading for Diabetic Foot Ulcers Forefoot offloading shoe 08/01/22 0921   Dressing Change Due 08/02/22 08/01/22 0921   Wound Length (cm) 0.5 cm 07/18/22 0937   Wound Width (cm) 0.7 cm 07/18/22 0937   Wound Depth (cm) 0.1 cm 07/18/22 0937   Wound Surface Area (cm^2) 0.35 cm^2 07/18/22 0937   Wound Volume (cm^3) 0.035 cm^3 07/18/22 0937   Wound Assessment Eschar dry 07/18/22 0937   Drainage Amount None 07/18/22 0937   Odor None 07/18/22 0937   Yuli-wound Assessment Dry/flaky 07/18/22 0937   Margins Defined edges 07/18/22 0937   Wound Thickness Description not for Pressure Injury Partial thickness 07/18/22 0937   Number of days: 32       Wound 06/29/22 Toe (Comment  which one) Left left 4th toe (Active)   Wound Image   08/01/22 0921   Wound Etiology Diabetic 08/01/22 0921   Dressing Status Dry 08/01/22 0921   Wound Cleansed Cleansed with saline 08/01/22 0921   Dressing/Treatment Betadine swabs/povidone iodine;Open to air 08/01/22 0921 Offloading for Diabetic Foot Ulcers Forefoot offloading shoe 08/01/22 0921   Dressing Change Due 08/02/22 08/01/22 0921   Wound Assessment Eschar dry 07/18/22 0937   Drainage Amount None 07/18/22 0937   Odor None 07/18/22 0937   Yuli-wound Assessment Dry/flaky 07/18/22 0937   Margins Defined edges 07/18/22 0937   Wound Thickness Description not for Pressure Injury Partial thickness 07/18/22 0937   Number of days: 32       Wound 06/30/22 Heel Right (Active)   Wound Image   08/01/22 0921   Wound Etiology Diabetic 07/18/22 0937   Dressing Status Dry 08/01/22 0921   Wound Cleansed Cleansed with saline 08/01/22 0921   Dressing/Treatment Betadine swabs/povidone iodine;Open to air 08/01/22 0921   Offloading for Diabetic Foot Ulcers Forefoot offloading shoe 08/01/22 0921   Dressing Change Due 08/02/22 08/01/22 0921   Wound Length (cm) 1 cm 07/18/22 0937   Wound Width (cm) 1.7 cm 07/18/22 0937   Wound Depth (cm) 0.1 cm 07/18/22 0937   Wound Surface Area (cm^2) 1.7 cm^2 07/18/22 0937   Change in Wound Size % (l*w) -844.44 07/18/22 0937   Wound Volume (cm^3) 0.17 cm^3 07/18/22 0937   Wound Healing % -372 07/18/22 0937   Wound Assessment Eschar dry 08/01/22 0921   Drainage Amount None 08/01/22 0921   Odor None 07/18/22 0937   Yuli-wound Assessment Dry/flaky 08/01/22 0921   Margins Defined edges 08/01/22 0921   Wound Thickness Description not for Pressure Injury Full thickness 08/01/22 0921   Number of days: 31       Wound 06/30/22 Foot Right;Lateral (Active)   Wound Image   08/01/22 0921   Wound Etiology Diabetic 08/01/22 0921   Dressing Status Dry 08/01/22 0921   Wound Cleansed Cleansed with saline 08/01/22 0921   Dressing/Treatment Betadine swabs/povidone iodine;Dry dressing 08/01/22 0921   Offloading for Diabetic Foot Ulcers Forefoot offloading shoe 08/01/22 0921   Dressing Change Due 08/02/22 08/01/22 0921   Wound Length (cm) 0.5 cm 06/30/22 1150   Wound Width (cm) 0.3 cm 06/30/22 1150   Wound Depth (cm) 0.1 cm 06/30/22 1150   Wound Surface Area (cm^2) 0.15 cm^2 06/30/22 1150   Wound Volume (cm^3) 0.015 cm^3 06/30/22 1150   Wound Assessment Eschar dry 08/01/22 0921   Drainage Amount None 08/01/22 0921   Odor None 07/18/22 0937   Yuli-wound Assessment Dry/flaky 08/01/22 0921   Margins Defined edges 08/01/22 0921   Wound Thickness Description not for Pressure Injury Full thickness 08/01/22 3287   Number of days: 31         Asessment: Patient is a 68 y.o. male with:   Hospital Problems             Last Modified POA    Uncontrolled type 2 DM with peripheral circulatory disorder ([facility] [address] 8/1/2022 Yes    PAD (peripheral artery disease) ([facility] [address] 8/1/2022 Yes    Skin ulcer of right great toe, limited to breakdown of skin ([facility] [address] 8/1/2022 Yes       Ulcer Classification:  Diabetic ulcer grade 2 C. IDSA  no infection. Plan:   Patient examined and evaluated. Diabetic foot education and exam.  Current condition and treatment options discussed in detail. No debridement needed today. Today's dressing:betadine bid  DM foot ed and exam  Continue offloading with surgical shoe. Offloading shoes at all times weightbearing. Patient was encouraged to follow up for vascular intervention. Patient's wife states he did see a different vascular surgeon after recent hospital stay. Patient states there are plans to set up for hyperbaric oxygen. Patient's wife is unsure if they are proceeding with any form of intervention to improve the blood flow. Continue heel lift offloading boot to wear at all times to relieve pressure from heel. Contact clinic with any questions/problems/concerns or new signs of infection. Follow-up at [facility] in 2 week(s).

## 2022-08-02 ENCOUNTER — CARE COORDINATION (OUTPATIENT)
Dept: CARE COORDINATION | Age: 77
End: 2022-08-02

## 2022-08-02 LAB
ALBUMIN (CALCULATED): 3.7 G/DL (ref 3.2–5.2)
ALBUMIN PERCENT: 63 % (ref 45–65)
ALPHA 1 PERCENT: 3 % (ref 3–6)
ALPHA 2 PERCENT: 12 % (ref 6–13)
ALPHA-1-GLOBULIN: 0.2 G/DL (ref 0.1–0.4)
ALPHA-2-GLOBULIN: 0.7 G/DL (ref 0.5–0.9)
BETA GLOBULIN: 0.6 G/DL (ref 0.5–1.1)
BETA PERCENT: 10 % (ref 11–19)
GAMMA GLOBULIN %: 12 % (ref 9–20)
GAMMA GLOBULIN: 0.7 G/DL (ref 0.5–1.5)
PATHOLOGIST: ABNORMAL
PATHOLOGIST: NORMAL
PROTEIN ELECTROPHORESIS, SERUM: ABNORMAL
SERUM IFX INTERP: NORMAL
TOTAL PROT. SUM,%: 100 % (ref 98–102)
TOTAL PROT. SUM: 5.9 G/DL (ref 6.3–8.2)
TOTAL PROTEIN: 5.8 G/DL (ref 6.4–8.3)

## 2022-08-03 ENCOUNTER — HOSPITAL ENCOUNTER (EMERGENCY)
Age: 77
Discharge: HOME | End: 2022-08-03
Attending: EMERGENCY MEDICINE
Payer: OTHER GOVERNMENT

## 2022-08-03 ENCOUNTER — TELEPHONE (OUTPATIENT)
Dept: FAMILY MEDICINE CLINIC | Age: 77
End: 2022-08-03

## 2022-08-03 VITALS
SYSTOLIC BLOOD PRESSURE: 99 MMHG | OXYGEN SATURATION: 100 % | HEART RATE: 59 BPM | RESPIRATION RATE: 24 BRPM | TEMPERATURE: 98 F | DIASTOLIC BLOOD PRESSURE: 42 MMHG

## 2022-08-03 DIAGNOSIS — Z01.30 BLOOD PRESSURE CHECK: Primary | ICD-10-CM

## 2022-08-03 PROCEDURE — 99282 EMERGENCY DEPT VISIT SF MDM: CPT

## 2022-08-03 ASSESSMENT — ENCOUNTER SYMPTOMS
CONSTIPATION: 0
BLOOD IN STOOL: 0
VOMITING: 0
SORE THROAT: 0
DIARRHEA: 0
WHEEZING: 0
TROUBLE SWALLOWING: 0
BACK PAIN: 0
ABDOMINAL PAIN: 0
NAUSEA: 0
SHORTNESS OF BREATH: 0

## 2022-08-03 ASSESSMENT — PAIN - FUNCTIONAL ASSESSMENT: PAIN_FUNCTIONAL_ASSESSMENT: NONE - DENIES PAIN

## 2022-08-03 NOTE — ED PROVIDER NOTES
[facility]  eMERGENCY dEPARTMENT eNCOUnter      Pt Name: Jocelynn Perez  MRN: [id number]  [place] 1945  Date of evaluation: 8/3/2022      CHIEF COMPLAINT       Chief Complaint   Patient presents with    Other         HISTORY OF PRESENT ILLNESS    Jocelynn Perez is a 68 y.o. male who presents with chief complaint of difficulty getting blood pressure, patient states that his visiting nurse came today and checked his blood pressure he can get a systolic but could not get a diastolic so he contacted his doctor's office wanted him checked patient's wife said his blood pressure been reading normal with her automatic cuff patient has no chest pain no shortness of breath no fevers or chills. Patient says he was watching a good program on TV and felt fine      REVIEW OF SYSTEMS         Review of Systems   Constitutional:  Negative for chills and fever. HENT:  Negative for congestion, dental problem, sore throat and trouble swallowing. Eyes:  Negative for visual disturbance. Respiratory:  Negative for shortness of breath and wheezing. Cardiovascular:  Positive for leg swelling. Negative for chest pain and palpitations. Patient has chronic leg swelling he says it is actually better than usual   Gastrointestinal:  Negative for abdominal pain, blood in stool, constipation, diarrhea, nausea and vomiting. Genitourinary:  Negative for difficulty urinating and dysuria. Musculoskeletal:  Negative for back pain, joint swelling and neck pain. Skin:  Negative for rash. Neurological:  Negative for dizziness, syncope, weakness and headaches. Hematological:  Negative for adenopathy. Does not bruise/bleed easily. Psychiatric/Behavioral:  Negative for confusion and suicidal ideas.         PAST MEDICAL HISTORY    has a past medical history of Cerebrovascular accident (CVA) due to embolism of cerebral artery ([address] CKD (chronic kidney disease) stage 4, GFR 15-29 ml/min ([facility] [address] Coronary artery disease that his mother is . He indicated that his father is . He indicated that both of his sisters are alive. He indicated that his brother is alive. family history is not on file. SOCIAL HISTORY      reports that he quit smoking about 5 months ago. His smoking use included cigarettes. He smoked an average of 1.5 packs per day. He has never used smokeless tobacco. He reports that he does not drink alcohol and does not use drugs. PHYSICAL EXAM     INITIAL VITALS:  tympanic temperature is 98 °F (36.7 °C). His blood pressure is 120/43 (abnormal) and his pulse is 51. His respiration is 16 and oxygen saturation is 100%. Physical Exam  Constitutional:       Appearance: Normal appearance. He is well-developed. Comments: Patient is nontoxic alert smiling his saturation is 99% on room air   HENT:      Head: Normocephalic and atraumatic. Right Ear: External ear normal.      Left Ear: External ear normal.   Eyes:      Pupils: Pupils are equal, round, and reactive to light. Cardiovascular:      Rate and Rhythm: Normal rate and regular rhythm. Pulmonary:      Effort: Pulmonary effort is normal.      Breath sounds: Normal breath sounds. Abdominal:      General: Bowel sounds are normal.      Palpations: Abdomen is soft. Musculoskeletal:         General: Normal range of motion. Cervical back: Normal range of motion and neck supple. Right lower leg: Edema present. Left lower leg: Edema present. Comments: Slight peripheral edema no calf tenderness   Skin:     General: Skin is warm and dry. Neurological:      General: No focal deficit present. Mental Status: He is alert and oriented to person, place, and time.    Psychiatric:         Behavior: Behavior normal.         DIFFERENTIAL DIAGNOSIS/ MDM:     Blood pressure 127/78 patient asymptomatic    DIAGNOSTIC RESULTS     EKG: All EKG's are interpreted by the Emergency Department Physician who either signs or Co-signs this chart in the absence of a cardiologist.        RADIOLOGY:   I directly visualized the following  images and reviewed the radiologist interpretations:          ED BEDSIDE ULTRASOUND:       LABS:  Labs Reviewed - No data to display        EMERGENCY DEPARTMENT COURSE:   Vitals:    Vitals:    08/03/22 1625   BP: (!) 120/43   Pulse: 51   Resp: 16   Temp: 98 °F (36.7 °C)   TempSrc: Tympanic   SpO2: 100%     -------------------------  BP: (!) 120/43, Temp: 98 °F (36.7 °C), Heart Rate: 51, Resp: 16        Re-evaluation Notes    Patient is resting comfortably with no complaints    CRITICAL CARE:   None        CONSULTS:      PROCEDURES:  None    FINAL IMPRESSION      1. Blood pressure check          DISPOSITION/PLAN   DISPOSITION Decision To Discharge    Condition on Disposition    Stable    PATIENT REFERRED TO:  Rebecca Mckoy MD  [address] [place] [address]  [phone]    In 1 week      DISCHARGE MEDICATIONS:  New Prescriptions    No medications on file       (Please note that portions of this note were completed with a voice recognition program.  Efforts were made to edit the dictations but occasionally words are mis-transcribed.)    Lorena Sheth MD,, MD, F.A.A.E.M.   Attending Emergency Physician                           Lorena Sheth MD  08/03/22 4143

## 2022-08-03 NOTE — TELEPHONE ENCOUNTER
Nyasia, pt wife, called stated HH was at the house today. The nurse checked his BP 2 times and was unable to hear it both time. [facility] nurse suggested calling EMS but pt and spouse Adrian Akins) denied the EMS. Nyasia called our office and asked for providers advise. Nyasia did check pt BP with automatic BP machine -/40. I asked Nyasia to check his BP now while on the phone with her--/49 and Pulse 48. I suggested taking pt to ER but she really didn't want to take him at this time. Pt has no c/o any issues.

## 2022-08-04 ENCOUNTER — CARE COORDINATION (OUTPATIENT)
Dept: CARE COORDINATION | Age: 77
End: 2022-08-04

## 2022-08-04 NOTE — TELEPHONE ENCOUNTER
Called Nyasia to see how pt was doing. Nyasia stated she did take pt to ER last night and they monitored pt and sent him home. . Pt has appt 08/05/2022 with PCP

## 2022-08-04 NOTE — CARE COORDINATION
Ambulatory Care Coordination  ED Follow up Call    Spoke with Spouse- Nyasia. Physical therapist was in home yesterday and was unable to get a bottom number with his BP. They were sent to ED. No change in medications. German Gonzalez feels he is doing really well. She will take home BP readings to apt tomorrow. She has spoken to Dr. Richard Briscoe nurse this am.   BP was 128/45 this am. Denied any light headedness, vertigo. He is scheduled with Dr. Jasbir Franks tomorrow. This writer will f/u next week. Reason for ED visit:  BP concerns   Status:     not changed    Did you call your PCP prior to going to the ED? Yes      Did you receive a discharge instructions from the Emergency Room? Yes  Review of Instructions:     Understands what to report/when to return?:  Yes   Understands discharge instructions?:  Yes   Following discharge instructions?:  Yes   If not why? N/A    Are there any new complaints of pain? No  New Pain Meds? N/A    Constipation prophylaxis needed? N/A    If you have a wound is the dressing clean, dry, and intact? N/A  Understands wound care regimen? N/A    Are there any other complaints/concerns that you wish to tell your provider? No    FU appts/Provider:    Future Appointments   Date Time Provider Hany Guerra   8/5/2022 10:50 [facility]   8/15/2022  9:30 AM CHUN Torres WOUND [facility]   9/6/2022 10:15 AM SCHEDULE, [address] MD LAB [place]   9/12/2022  1:30 PM [facility]anks[facility]ers [facility]   10/5/2022  9:40 AM Theodore Bright MD [facility]           New Medications?:   No      Medication Reconciliation by phone - No  Understands Medications? Yes  Taking Medications? Yes  Can you swallow your pills? Yes    Any further needs in the home i.e. Equipment?   Not Applicable    Link to services in community?:  Yes   Which services:  [address]    Future Appointments   Date Time Provider Hany Guerra   8/5/2022 10:50 AM David Mon MD [facility] 8/15/2022  9:30 AM CHUN [address] [facility]   9/6/2022 10:15 AM SCHEDULE, [address] LAB MD LAB [place]   9/12/2022  1:30 PM Raine Causey MD [facility]   10/5/2022  9:40 AM Ariela Richard MD [facility]

## 2022-08-05 ENCOUNTER — OFFICE VISIT (OUTPATIENT)
Dept: FAMILY MEDICINE CLINIC | Age: 77
End: 2022-08-05
Payer: MEDICARE

## 2022-08-05 VITALS
WEIGHT: 192.4 LBS | HEIGHT: 70 IN | BODY MASS INDEX: 27.54 KG/M2 | DIASTOLIC BLOOD PRESSURE: 50 MMHG | SYSTOLIC BLOOD PRESSURE: 100 MMHG | HEART RATE: 52 BPM | OXYGEN SATURATION: 99 % | RESPIRATION RATE: 16 BRPM

## 2022-08-05 DIAGNOSIS — I25.5 ISCHEMIC CARDIOMYOPATHY: ICD-10-CM

## 2022-08-05 DIAGNOSIS — I50.22 CHRONIC SYSTOLIC (CONGESTIVE) HEART FAILURE (HCC): Primary | ICD-10-CM

## 2022-08-05 DIAGNOSIS — L97.509 ULCER OF TOE, UNSPECIFIED LATERALITY, UNSPECIFIED ULCER STAGE (HCC): ICD-10-CM

## 2022-08-05 DIAGNOSIS — Z09 HOSPITAL DISCHARGE FOLLOW-UP: ICD-10-CM

## 2022-08-05 DIAGNOSIS — N18.30 STAGE 3 CHRONIC KIDNEY DISEASE, UNSPECIFIED WHETHER STAGE 3A OR 3B CKD (HCC): ICD-10-CM

## 2022-08-05 DIAGNOSIS — I95.9 HYPOTENSION, UNSPECIFIED HYPOTENSION TYPE: ICD-10-CM

## 2022-08-05 PROCEDURE — G8417 CALC BMI ABV UP PARAM F/U: HCPCS | Performed by: FAMILY MEDICINE

## 2022-08-05 PROCEDURE — 99214 OFFICE O/P EST MOD 30 MIN: CPT | Performed by: FAMILY MEDICINE

## 2022-08-05 PROCEDURE — 1036F TOBACCO NON-USER: CPT | Performed by: FAMILY MEDICINE

## 2022-08-05 PROCEDURE — G8427 DOCREV CUR MEDS BY ELIG CLIN: HCPCS | Performed by: FAMILY MEDICINE

## 2022-08-05 PROCEDURE — 1124F ACP DISCUSS-NO DSCNMKR DOCD: CPT | Performed by: FAMILY MEDICINE

## 2022-08-05 PROCEDURE — 99213 OFFICE O/P EST LOW 20 MIN: CPT

## 2022-08-05 RX ORDER — SACUBITRIL AND VALSARTAN 24; 26 MG/1; MG/1
1 TABLET, FILM COATED ORAL 2 TIMES DAILY
COMMUNITY

## 2022-08-05 NOTE — PROGRESS NOTES
HPI:  Patient comes in today for   Chief Complaint   Patient presents with    Follow-Up from [facility] f/u  [facility] 22   Patient here to f/u has had multiple hospitalizations ,recently at [facility] with low BP and hsi medications were totrated in the past has  needed transfusions for low hgb not needed this time. [facility] has [facility] s/p CABG in 2022,cardiomyopathy has life vest in place  ,s/p CVA in 2021 had thrombolytics no residual problems,HTN,DM Type 2,CKD. No chest pain or SOB. Has a sore in right great toe for the last 3 months is seeing  podiatry also has seen vascular. Has home health currently with nursing and PT/OT    HISTORY:  Past Medical History:   Diagnosis Date    Cerebrovascular accident (CVA) due to embolism of cerebral artery (HCC)     CKD (chronic kidney disease) stage 4, GFR 15-29 ml/min ([facility])     Coronary artery disease involving coronary bypass graft of native heart without angina pectoris     H/O lower gastrointestinal bleeding     Tobacco abuse     Type II or unspecified type diabetes mellitus without mention of complication, not stated as uncontrolled        Past Surgical History:   Procedure Laterality Date    CARDIAC SURGERY      UPPER GASTROINTESTINAL ENDOSCOPY N/A 2022    ** BEDSIDE** EGD ESOPHAGOGASTRODUODENOSCOPY performed by Danna Montoya MD at [facility]        No family history on file.     Social History     Socioeconomic History    Marital status:      Spouse name: Not on file    Number of children: Not on file    Years of education: Not on file    Highest education level: Not on file   Occupational History    Not on file   Tobacco Use    Smoking status: Former     Packs/day: 1.50     Types: Cigarettes     Quit date: 2022     Years since quittin.4    Smokeless tobacco: Never   Substance and Sexual Activity    Alcohol use: No    Drug use: No    Sexual activity: Not on file   Other Topics Concern    Not on file   Social History Narrative    Not on file     Social Determinants of Health     Financial Resource Strain: Low Risk     Difficulty of Paying Living Expenses: Not very hard   Food Insecurity: No Food Insecurity    Worried About Running Out of Food in the Last Year: Never true    Ran Out of Food in the Last Year: Never true   Transportation Needs: No Transportation Needs    Lack of Transportation (Medical): No    Lack of Transportation (Non-Medical): No   Physical Activity: Inactive    Days of Exercise per Week: 0 days    Minutes of Exercise per Session: 0 min   Stress: No Stress Concern Present    Feeling of Stress : Not at all   Social Connections: Moderately Isolated    Frequency of Communication with Friends and Family: More than three times a week    Frequency of Social Gatherings with Friends and Family: More than three times a week    Attends Orthodox Services: Never    Active Member of Clubs or Organizations: No    Attends Club or Organization Meetings: Never    Marital Status:    Intimate Partner Violence: Not on file   Housing Stability: [address] to Pay for Housing in the Last Year: No    Number of [place] in the Last Year: 1    Unstable Housing in the Last Year: No       Current Outpatient Medications   Medication Sig Dispense Refill    sacubitril-valsartan (ENTRESTO) 24-26 MG per tablet Take 1 tablet by mouth 2 times daily      metoprolol succinate (TOPROL XL) 25 MG extended release tablet Take 0.5 tablets by mouth daily 45 tablet 3    apixaban (ELIQUIS) 2.5 MG TABS tablet Take 2.5 mg by mouth in the morning and 2.5 mg before bedtime.       pantoprazole (PROTONIX) 40 MG tablet Take 1 tablet by mouth every morning (before breakfast) 30 tablet 3    furosemide (LASIX) 40 MG tablet Take 1 tablet by mouth 2 times daily 180 tablet 3    Multiple Vitamins-Minerals (PRESERVISION AREDS 2 PO) Take 1 tablet by mouth 2 times daily      amiodarone (CORDARONE) 200 MG tablet Take 200 mg by mouth daily      aspirin 81 MG EC tablet Take 81 mg by mouth daily      atorvastatin (LIPITOR) 40 MG tablet Take 40 mg by mouth nightly       bisacodyl (DULCOLAX) 10 MG suppository Place 10 mg rectally in the morning.      ergocalciferol (ERGOCALCIFEROL) 1.25 MG (33712 UT) capsule TAKE ONE CAPSULE BY MOUTH ONCE EVERY WEEK FOR VITAMIN (D) DEFICIENCY      insulin lispro, 1 Unit Dial, 100 UNIT/ML SOPN Per s/s      isosorbide dinitrate (ISORDIL) 10 MG tablet Take 10 mg by mouth 3 times daily      magnesium oxide (MAG-OX) 400 (240 Mg) MG tablet Take 200 mg by mouth daily      QUEtiapine (SEROQUEL) 25 MG tablet Take 25 mg by mouth      GLIPIZIDE PO Take 5 mg by mouth daily        No current facility-administered medications for this visit. Allergies   Allergen Reactions    Simvastatin Headaches       REVIEW OF SYSTEMS:  General: No fevers, chills, change in weight  HEENT: No double vision, blurry vision, runny nose, sore throat, tinnitus  Cardio: No chest pain, palpitations, WILLIS, edema, PND  Pulmonary: No cough, hemoptysis, SOB  GI: No nausea, vomiting, dysphagia, odynophagia, diarrhea, has chronic constipation. : No dysuria, hematuria, urgency, incontinence  Musculoskeletal: No muscle or joint aches, no joint swelling  Neuro: No dizziness/lightheadedness, no seizures  Endocrine: No polyuria, polydipsia, polyphagia, no temperature intolerance  Skin: Has toe ulcers. Sleep: good  Psychiatric: No depression or anxiety    PHYSICAL EXAM:  VS:  BP (!) 100/50   Pulse 52   Resp 16   Ht 5' 10\" (1.778 m)   Wt 192 lb 6.4 oz (87.3 kg)   SpO2 99%   BMI 27.61 kg/m²   General:  Alert and oriented, NAD  HEENT:  TMs, STEFAN, EOMI, Conjunctivae clear       Throat currently clear. NECK:  Supple without adenopathy or thyromegaly, no carotid bruits  LUNGS:  CTA all fields  HEART:  Irregular  ABDOMEN:  Soft and nontender without palpable abnormalities  EXTREMITIES:Both feet in dressing and boot,has mild swelling in  ankles. , no calf tenderness  NEURO:

## 2022-08-09 ENCOUNTER — CARE COORDINATION (OUTPATIENT)
Dept: CARE COORDINATION | Age: 77
End: 2022-08-09

## 2022-08-09 NOTE — CARE COORDINATION
Ambulatory Care Coordination Note  8/9/2022    ACC: Rocco Escalona, RN    Summary Note: Charmayne Roche was referred for [facility]. He was recently transferred to [facility] on 7/22/2022. Per chart review discharged 7/26/2022. This writer attempted to reach 7/27/2022 per phone- LM. Returned to [facility] ED 7/27/2022     He has:            Type II Diabetes- Hgb A1c on 7/2022- 5.7, diet, medications and follows with PCP                           Ischemic cardiomyopathy, CHF- Life vest d/c'd, on medications and follows with cardiology                          CKD- medications and follows with nephrology     [facility]- nursing, PT/OT through [facility] through the [address] [place] [address] of [facility] : Continue assessments, education, and support                          CHF and DM Zone Tools                          Medications- she declined review- Trudy Gunter did review and set them up for her                          [facility] [place]. decision maker updated and she has ACP documents at home. She is aware to bring in for EMR                          Reviewed Urgent Care, clinic and My Chart                          Spouse is working with [address] and [place] care through [facility] care available- nathan can call [facility] at [phone].  She declined at this time                          Requested [employer] to check on resources in our area for Palliative per spouse's request                          Dietitian referral was placed by [facility] podiatry- wound care 8/15/2022    Apt cardiology 8/26/2022                          Apt lab 8/1/2022                          Apt PCP 8/5/2022 completed- next 10/5/2022                          Apt nephrology 9/12/2022    Apt Vascular- 9/22/2022                          F/U on BS, wt, BP and P                          He was started on Entresto and spouse is going to check with [employer] to see if they qualify for assistance- number given.     8/9/2022- 3:13 pm spoke with Nyasia. Reviewed PCP recommendations from [facility]- Patient was advised to hold metoprolol if SBP<90 or DBP<60. Metoprolol is 25 mg- 0.5 tab daily per nephrology new script on 7/11/2022. She voiced understanding. /54, HR 66, wt 193 yesterday 194 today- swelling unilateral leg stable- she stated he has been eating more. Reviewed low salt diet. He is elevating feet when in chair. BS- 78.   [address] and PT was in home today. She stated he is doing better. We discussed importance of reviewing AVS and medication list. She will locate his paperwork. Also advised to show AVS to [facility] nurse. She voiced understanding. He was to f/u with vascular but has had several hospitalizations. He did not get scheduled for testing. Nyasia would prefer to have an apt scheduled to see him. Next available apt scheduled. Nyasia aware. General Assessment    Do you have any symptoms that are causing concern?: Yes  Progression since Onset: Gradually Improving  Reported Symptoms: Other (Comment: wound foot)        Diabetes Assessment    Medic Alert ID: No  Meal Planning: Carb counting   How often do you test your blood sugar?: Meals (Comment: BID)   Do you have barriers with adherence to non-pharmacologic self-management interventions?  (Nutrition/Exercise/Self-Monitoring): No   Have you ever had to go to the ED for symptoms of low blood sugar?: No       No patient-reported symptoms   Do you have hyperglycemia symptoms?: No   Do you have hypoglycemia symptoms?: No   Last Blood Sugar Value: 79   Blood Sugar Monitoring Regimen: Before Meals   Blood Sugar Trends: No Change         and   Congestive Heart Failure Assessment    Do you understand a low sodium diet?: Yes  Do you understand how to read food labels?: Yes  How many restaurant meals do you eat per week?: 0  Do you salt your food before tasting it?: No     No patient-reported symptoms      Symptoms:  CHF associated leg swelling: Pos      Symptom Take 200 mg by mouth daily 2/24/22   Historical Provider, MD   aspirin 81 MG EC tablet Take 81 mg by mouth daily    Historical Provider, MD   atorvastatin (LIPITOR) 40 MG tablet Take 40 mg by mouth nightly  9/30/21   Historical Provider, MD   bisacodyl (DULCOLAX) 10 MG suppository Place 10 mg rectally in the morning.     Historical Provider, MD   ergocalciferol (ERGOCALCIFEROL) 1.25 MG (78531 UT) capsule TAKE ONE CAPSULE BY MOUTH ONCE EVERY WEEK FOR VITAMIN (D) DEFICIENCY 10/12/21   Historical Provider, MD   insulin lispro, 1 Unit Dial, 100 UNIT/ML SOPN Per s/s 2/24/22   Historical Provider, MD   isosorbide dinitrate (ISORDIL) 10 MG tablet Take 10 mg by mouth 3 times daily 4/19/22   Historical Provider, MD   magnesium oxide (MAG-OX) 400 (240 Mg) MG tablet Take 200 mg by mouth daily 3/31/22   Historical Provider, MD   QUEtiapine (SEROQUEL) 25 MG tablet Take 25 mg by mouth 2/24/22   Historical Provider, MD   GLIPIZIDE PO Take 5 mg by mouth daily     Historical Provider, MD       Future Appointments   Date Time Provider Hany Aldridgei   8/15/2022  9:30 AM Bouchra Began, DPNED [place] [address] [place] [address]   9/6/2022 10:15 AM SCHEDULE, Glenny Coulter [address] [facility] [address] [place]   9/12/2022  1:30 PM Raulito Ralph MD [facility] CTR DPP   9/22/2022  1:30 PM Wendy Torres MD [facility] DPP   10/5/2022  9:40 AM Harley Burton MD [facility]

## 2022-08-12 RX ORDER — METOPROLOL SUCCINATE 25 MG/1
12.5 TABLET, EXTENDED RELEASE ORAL DAILY
Status: CANCELLED | OUTPATIENT
Start: 2022-08-12 | End: 2022-09-11

## 2022-08-15 ENCOUNTER — HOSPITAL ENCOUNTER (OUTPATIENT)
Dept: WOUND CARE | Age: 77
Discharge: HOME | End: 2022-08-16
Payer: OTHER GOVERNMENT

## 2022-08-15 VITALS
SYSTOLIC BLOOD PRESSURE: 118 MMHG | HEART RATE: 76 BPM | RESPIRATION RATE: 18 BRPM | WEIGHT: 192 LBS | HEIGHT: 70 IN | DIASTOLIC BLOOD PRESSURE: 60 MMHG | BODY MASS INDEX: 27.49 KG/M2 | TEMPERATURE: 97.2 F

## 2022-08-15 DIAGNOSIS — L97.511 SKIN ULCER OF RIGHT GREAT TOE, LIMITED TO BREAKDOWN OF SKIN (HCC): Primary | ICD-10-CM

## 2022-08-15 PROCEDURE — 97597 DBRDMT OPN WND 1ST 20 CM/<: CPT | Performed by: PODIATRIST

## 2022-08-15 PROCEDURE — 97597 DBRDMT OPN WND 1ST 20 CM/<: CPT

## 2022-08-15 RX ORDER — GINSENG 100 MG
CAPSULE ORAL ONCE
OUTPATIENT
Start: 2022-08-15 | End: 2022-08-15

## 2022-08-15 RX ORDER — BETAMETHASONE DIPROPIONATE 0.05 %
OINTMENT (GRAM) TOPICAL ONCE
OUTPATIENT
Start: 2022-08-15 | End: 2022-08-15

## 2022-08-15 RX ORDER — LIDOCAINE HYDROCHLORIDE 40 MG/ML
SOLUTION TOPICAL ONCE
OUTPATIENT
Start: 2022-08-15 | End: 2022-08-15

## 2022-08-15 RX ORDER — CLOBETASOL PROPIONATE 0.5 MG/G
OINTMENT TOPICAL ONCE
OUTPATIENT
Start: 2022-08-15 | End: 2022-08-15

## 2022-08-15 RX ORDER — BACITRACIN, NEOMYCIN, POLYMYXIN B 400; 3.5; 5 [USP'U]/G; MG/G; [USP'U]/G
OINTMENT TOPICAL ONCE
OUTPATIENT
Start: 2022-08-15 | End: 2022-08-15

## 2022-08-15 RX ORDER — GENTAMICIN SULFATE 1 MG/G
OINTMENT TOPICAL ONCE
OUTPATIENT
Start: 2022-08-15 | End: 2022-08-15

## 2022-08-15 RX ORDER — LIDOCAINE 40 MG/G
CREAM TOPICAL ONCE
OUTPATIENT
Start: 2022-08-15 | End: 2022-08-15

## 2022-08-15 RX ORDER — BACITRACIN ZINC AND POLYMYXIN B SULFATE 500; 1000 [USP'U]/G; [USP'U]/G
OINTMENT TOPICAL ONCE
OUTPATIENT
Start: 2022-08-15 | End: 2022-08-15

## 2022-08-15 RX ORDER — LIDOCAINE HYDROCHLORIDE 20 MG/ML
JELLY TOPICAL ONCE
OUTPATIENT
Start: 2022-08-15 | End: 2022-08-15

## 2022-08-15 RX ORDER — LIDOCAINE 50 MG/G
OINTMENT TOPICAL ONCE
OUTPATIENT
Start: 2022-08-15 | End: 2022-08-15

## 2022-08-15 NOTE — PROGRESS NOTES
Subjective:    Tristan Sherwood is a 68 y.o. male who presents with the ulcers bilateral.   Pt yet to follow back up with vascular sx. . Patient has been compliant with off loading. Currently denies F/C/N/V. Overall diabetic control is not changed. Allergies   Allergen Reactions    Simvastatin Headaches       Past Medical History:   Diagnosis Date    Cerebrovascular accident (CVA) due to embolism of cerebral artery (HCC)     CKD (chronic kidney disease) stage 4, GFR 15-29 ml/min ([facility])     Coronary artery disease involving coronary bypass graft of native heart without angina pectoris     H/O lower gastrointestinal bleeding     Tobacco abuse     Type II or unspecified type diabetes mellitus without mention of complication, not stated as uncontrolled        Prior to Admission medications    Medication Sig Start Date End Date Taking? Authorizing Provider   sacubitril-valsartan (ENTRESTO) 24-26 MG per tablet Take 1 tablet by mouth 2 times daily    Historical Provider, MD   Handicap Placard MISC by Does not apply route Good for 5 years 8/5/22   Monae Martin MD   metoprolol succinate (TOPROL XL) 25 MG extended release tablet Take 0.5 tablets by mouth daily 7/11/22   Sanam Dallas MD   apixaban (ELIQUIS) 2.5 MG TABS tablet Take 2.5 mg by mouth in the morning and 2.5 mg before bedtime.     Historical Provider, MD   pantoprazole (PROTONIX) 40 MG tablet Take 1 tablet by mouth every morning (before breakfast) 7/3/22   Lelia Watson MD   furosemide (LASIX) 40 MG tablet Take 1 tablet by mouth 2 times daily 5/13/22   Sanam Dallas MD   Multiple Vitamins-Minerals (PRESERVISION AREDS 2 PO) Take 1 tablet by mouth 2 times daily    Historical Provider, MD   amiodarone (CORDARONE) 200 MG tablet Take 200 mg by mouth daily 2/24/22   Historical Provider, MD   aspirin 81 MG EC tablet Take 81 mg by mouth daily    Historical Provider, MD   atorvastatin (LIPITOR) 40 MG tablet Take 40 mg by mouth nightly  9/30/21   Historical Provider, MD   bisacodyl (DULCOLAX) 10 MG suppository Place 10 mg rectally in the morning. Historical Provider, MD   ergocalciferol (ERGOCALCIFEROL) 1.25 MG (05671 UT) capsule TAKE ONE CAPSULE BY MOUTH ONCE EVERY WEEK FOR VITAMIN (D) DEFICIENCY 10/12/21   Historical Provider, MD   insulin lispro, 1 Unit Dial, 100 UNIT/ML SOPN Per s/s 22   Historical Provider, MD   isosorbide dinitrate (ISORDIL) 10 MG tablet Take 10 mg by mouth 3 times daily 22   Historical Provider, MD   magnesium oxide (MAG-OX) 400 (240 Mg) MG tablet Take 200 mg by mouth daily 3/31/22   Historical Provider, MD   QUEtiapine (SEROQUEL) 25 MG tablet Take 25 mg by mouth 22   Historical Provider, MD   GLIPIZIDE PO Take 5 mg by mouth daily     Historical Provider, MD       Social History     Tobacco Use    Smoking status: Former     Packs/day: 1.50     Types: Cigarettes     Quit date: 2022     Years since quittin.4    Smokeless tobacco: Never   Substance Use Topics    Alcohol use: No       ROS: All 14 ROS systems reviewed and pertinent positives noted above, all others negative. Lower Extremity Physical Examination:     Vitals:   Vitals:    08/15/22 0924   BP: 118/60   Pulse: 76   Resp: 18   Temp: 97.2 °F (36.2 °C)     General: AAO x 3 in NAD. Vascular: DP and PT pulses palpable 1/4, bilateral.  CFT >5 seconds, bilateral.  Hair growth absent to the level of the digits, bilateral.  Edema present, bilateral.  Varicosities present, bilateral. Erythema absent, bilateral. Distal Rubor present bilateral toes. Temperature decreased bilateral. Hyperpigmentation present bilateral. Atrophic skin yes. Neurological: Sensation intact to light touch to level of digits, bilateral.  Protective sensation intact 10/10 sites via 5.07/10g [place] Monofilament, bilateral.  negative Tinel's, bilateral.  negative Valleix sign, bilateral.  Vibratory intact bilateral.  Reflexes Decreased bilateral.  Paresthesias negative.   Dysthesias negative. Sharp/dull intact bilateral.    Musculoskeletal: Muscle strength 5/5, bilateral.  Pain present upon palpation R hallux. Normal medial longitudinal arch, bilateral.  Ankle ROM decreased,bilateral.  1st MPJ ROM within normal limits, bilateral.  Dorsally contracted digits absent. No other foot deformities. Integument:   Open lesion present, Right. Ulcer medial right hallux with positive eschar formation. Plantar edge loosened up with small amount of healthier tissue base coming in. No undermining no probing no malodor no signs infection on periphery no proximal streaking. ulceration posterior lateral right heel superficial nature. No signs of infection. Patient also has ulcer distal left fourth toe with a mild superficial eschar. discoloration seen right distal second third fourth toes with no current breakdown or signs of infection. Not progressed since last visit whatsoever on the toes. Interdigital maceration absent to web spaces , Bilateral.  Nails b/l thickened, dystrophic and crumbly, discolored with subungual debris. Fissures absent, Bilateral. Hyperkeratotic tissue is absent.   Wound 05/05/22 Medial # 1 right great toe (Active)   Wound Image   08/15/22 0924   Wound Etiology Diabetic 08/15/22 0924   Dressing Status Dry 08/15/22 0924   Wound Cleansed Cleansed with saline 08/15/22 0924   Dressing/Treatment Betadine swabs/povidone iodine;Open to air 08/01/22 0921   Offloading for Diabetic Foot Ulcers Forefoot offloading shoe 08/15/22 0924   Dressing Change Due 08/16/22 08/15/22 0924   Wound Length (cm) 5.5 cm 08/15/22 0924   Wound Width (cm) 2 cm 08/15/22 0924   Wound Depth (cm) 0.1 cm 08/15/22 0924   Wound Surface Area (cm^2) 11 cm^2 08/15/22 0924   Change in Wound Size % (l*w) -92.98 08/15/22 0924   Wound Volume (cm^3) 1.1 cm^3 08/15/22 0924   Wound Healing % -93 08/15/22 0924   Post-Procedure Length (cm) 3.8 cm 06/27/22 0828   Post-Procedure Width (cm) 2.5 cm 06/27/22 0828   Post-Procedure Depth (cm) 0.1 cm 06/27/22 0828   Post-Procedure Surface Area (cm^2) 9.5 cm^2 06/27/22 0828   Post-Procedure Volume (cm^3) 0.95 cm^3 06/27/22 0828   Wound Assessment Pink/red 08/15/22 0924   Drainage Amount None 08/15/22 0924   Drainage Description Serosanguinous 07/18/22 0937   Odor None 08/15/22 0924   Yuli-wound Assessment Intact 08/15/22 0924   Margins Defined edges 08/15/22 0924   Wound Thickness Description not for Pressure Injury Partial thickness 08/15/22 0924   Number of days: 102       Wound 06/29/22 Toe (Comment  which one) Right 4th toe (Active)   Wound Image   08/01/22 0921   Wound Etiology Diabetic 08/01/22 0921   Dressing Status Dry 08/01/22 0921   Wound Cleansed Cleansed with saline 08/01/22 0921   Dressing/Treatment Betadine swabs/povidone iodine 08/01/22 0921   Offloading for Diabetic Foot Ulcers Forefoot offloading shoe 08/01/22 0921   Dressing Change Due 08/02/22 08/01/22 0921   Wound Length (cm) 0.6 cm 08/15/22 0924   Wound Width (cm) 0.5 cm 08/15/22 0924   Wound Depth (cm) 0.1 cm 08/15/22 0924   Wound Surface Area (cm^2) 0.3 cm^2 08/15/22 0924   Change in Wound Size % (l*w) 14.29 08/15/22 0924   Wound Volume (cm^3) 0.03 cm^3 08/15/22 0924   Wound Healing % 14 08/15/22 0924   Wound Assessment Eschar dry 07/18/22 0937   Drainage Amount None 07/18/22 0937   Odor None 07/18/22 0937   Yuli-wound Assessment Dry/flaky 07/18/22 0937   Margins Defined edges 07/18/22 0937   Wound Thickness Description not for Pressure Injury Partial thickness 07/18/22 0937   Number of days: 46       Wound 06/29/22 Toe (Comment  which one) Left left 4th toe (Active)   Wound Image   08/01/22 0921   Wound Etiology Diabetic 08/01/22 0921   Dressing Status Dry 08/01/22 0921   Wound Cleansed Cleansed with saline 08/01/22 0921   Dressing/Treatment Betadine swabs/povidone iodine;Open to air 08/01/22 0921   Offloading for Diabetic Foot Ulcers Forefoot offloading shoe 08/01/22 0921   Dressing Change Due 08/02/22 08/01/22 8976 Wound Assessment Eschar dry 07/18/22 0937   Drainage Amount None 07/18/22 0937   Odor None 07/18/22 0937   Yuli-wound Assessment Dry/flaky 07/18/22 0937   Margins Defined edges 07/18/22 0937   Wound Thickness Description not for Pressure Injury Partial thickness 07/18/22 0937   Number of days: 46       Wound 06/30/22 Heel Right (Active)   Wound Image   08/15/22 0924   Wound Etiology Diabetic 08/15/22 0924   Dressing Status Dry 08/15/22 0924   Wound Cleansed Cleansed with saline 08/15/22 0924   Dressing/Treatment Betadine swabs/povidone iodine;Open to air 08/01/22 0921   Offloading for Diabetic Foot Ulcers Forefoot offloading shoe 08/15/22 0924   Dressing Change Due 08/16/22 08/15/22 0924   Wound Length (cm) 1 cm 08/15/22 0924   Wound Width (cm) 0.8 cm 08/15/22 0924   Wound Depth (cm) 0.1 cm 08/15/22 0924   Wound Surface Area (cm^2) 0.8 cm^2 08/15/22 0924   Change in Wound Size % (l*w) -344.44 08/15/22 0924   Wound Volume (cm^3) 0.08 cm^3 08/15/22 0924   Wound Healing % -122 08/15/22 0924   Wound Assessment Eschar dry 08/01/22 0921   Drainage Amount None 08/15/22 0924   Odor None 07/18/22 0937   Yuli-wound Assessment Dry/flaky 08/15/22 0924   Margins Defined edges 08/15/22 0924   Wound Thickness Description not for Pressure Injury Full thickness 08/15/22 0924   Number of days: 45       Wound 06/30/22 Foot Right;Lateral (Active)   Wound Image   08/15/22 0924   Wound Etiology Diabetic 08/15/22 0924   Dressing Status Dry 08/15/22 0924   Wound Cleansed Cleansed with saline 08/15/22 0924   Dressing/Treatment Betadine swabs/povidone iodine;Dry dressing 08/01/22 0921   Offloading for Diabetic Foot Ulcers Forefoot offloading shoe 08/15/22 0924   Dressing Change Due 08/16/22 08/15/22 0924   Wound Assessment Eschar dry 08/15/22 0924   Drainage Amount None 08/15/22 0924   Odor None 07/18/22 0937   Yuli-wound Assessment Dry/flaky 08/15/22 0924   Margins Defined edges 08/15/22 0924   Wound Thickness Description not for Pressure Injury Full thickness 08/15/22 0924   Number of days: 39         Asessment: Patient is a 68 y.o. male with:   Hospital Problems             Last Modified POA    PAD (peripheral artery disease) ([facility] 8/15/2022 Yes    Skin ulcer of right great toe, limited to breakdown of skin ([address] 8/15/2022 Yes    Ulcer of toe, left, limited to breakdown of skin ([facility] 8/15/2022 Yes       Ulcer Classification:  Diabetic ulcer grade 2 C. IDSA  no infection. Plan:   Patient examined and evaluated. Diabetic foot education and exam.  Current condition and treatment options discussed in detail. Active wound management took place 100% non excisional with the use of  tissue nippers. All non viable tissue (including epidermis and/or dermis) and bio burden was removed to promote healing. Bleeding was present. Please see chart for exact measurements, if not documented then size was less than 20 sq cm.,  Today's dressing:betadine bid  DM foot ed and exam  Continue offloading with surgical shoe. Patient was encouraged to follow up for vascular intervention. Continue heel lift offloading boot to wear at all times to relieve pressure from heel. Contact clinic with any questions/problems/concerns or new signs of infection. Follow-up at [facility] in 2 week(s).

## 2022-08-15 NOTE — DISCHARGE INSTRUCTIONS
Continue same dressing to both feet. Change dressing daily. SIGNS OF INFECTION  - Redness, swelling, skin hot  - Wound bed turns black or stringy yellow  - Foul odor  - Increased drainage or pus  - Increased pain  - Fever greater than 100F    CALL YOUR DOCTOR OR SEEK MEDICAL ATTENTION IF SIGNS OF INFECTION.   DO NOT WAIT UNTIL YOUR NEXT APPOINTMENT    Call the Wound Care Nurse with any other questions or concerns- [phone]  Follow up as scheduled

## 2022-08-16 ENCOUNTER — CARE COORDINATION (OUTPATIENT)
Dept: CARE COORDINATION | Age: 77
End: 2022-08-16

## 2022-08-16 NOTE — CARE COORDINATION
Ambulatory Care Coordination Note  8/16/2022    ACC: Jude Ledesma RN    Summary Note:  Dutch was referred for [employer]. He was recently transferred to [facility] on 7/22/2022. Per chart review discharged 7/26/2022. This writer attempted to reach 7/27/2022 per phone- LM. Returned to [facility] ED 7/27/2022     He has:            Type II Diabetes- Hgb A1c on 7/2022- 5.7, diet, medications and follows with PCP                           Ischemic cardiomyopathy, CHF- Life vest d/c'd, on medications and follows with cardiology                          CKD- medications and follows with nephrology     [facility] care- nursing, PT/OT through [facility] through the [address] [place] [address] of Care : Continue assessments, education, and support                          CHF and DM Zone Tools                          Medications- she declined review- Alvamilecristo Gunter did review and set them up for her                          [facility]. decision maker updated and she has ACP documents at home. She is aware to bring in for EMR                          Reviewed Urgent Care, clinic and My Chart                          Spouse is working with [address] and [facility] care through [facility] care available- spouse can call Doreen Joe at [phone]. She declined at this time                          Dietitian referral was placed by [facility]                          Apt podiatry- wound care 8/29/2022                          Apt cardiology 8/26/2022                          Apt PCP 8/5/2022 completed- next 10/5/2022                          Apt nephrology 9/12/2022                          Apt Vascular- 8/25/2022                          F/U on BS, wt, BP and P                          He was started on Entresto and spouse is going to check with [employer] to see if they qualify for assistance- number given.       Per chart review- continues with Pain Management, vascular apt moved up to 8/25/2022.   8/16/2022- 1:06 pm Left message requesting return call @ [phone]. Lab Results       None            Care Coordination Interventions    Referral from Primary Care Provider: No  Suggested Interventions and Community Resources          Goals Addressed    None         Prior to Admission medications    Medication Sig Start Date End Date Taking? Authorizing Provider   sacubitril-valsartan (ENTRESTO) 24-26 MG per tablet Take 1 tablet by mouth 2 times daily    Historical Provider, MD   Handicap Placard MISC by Does not apply route Good for 5 years 8/5/22   Analia Ashby MD   metoprolol succinate (TOPROL XL) 25 MG extended release tablet Take 0.5 tablets by mouth daily 7/11/22   Shady Spencer MD   apixaban (ELIQUIS) 2.5 MG TABS tablet Take 2.5 mg by mouth in the morning and 2.5 mg before bedtime. Historical Provider, MD   pantoprazole (PROTONIX) 40 MG tablet Take 1 tablet by mouth every morning (before breakfast) 7/3/22   Chandler Nino MD   furosemide (LASIX) 40 MG tablet Take 1 tablet by mouth 2 times daily 5/13/22   Shady Spencer MD   Multiple Vitamins-Minerals (PRESERVISION AREDS 2 PO) Take 1 tablet by mouth 2 times daily    Historical Provider, MD   amiodarone (CORDARONE) 200 MG tablet Take 200 mg by mouth daily 2/24/22   Historical Provider, MD   aspirin 81 MG EC tablet Take 81 mg by mouth daily    Historical Provider, MD   atorvastatin (LIPITOR) 40 MG tablet Take 40 mg by mouth nightly  9/30/21   Historical Provider, MD   bisacodyl (DULCOLAX) 10 MG suppository Place 10 mg rectally in the morning.     Historical Provider, MD   ergocalciferol (ERGOCALCIFEROL) 1.25 MG (71491 UT) capsule TAKE ONE CAPSULE BY MOUTH ONCE EVERY WEEK FOR VITAMIN (D) DEFICIENCY 10/12/21   Historical Provider, MD   insulin lispro, 1 Unit Dial, 100 UNIT/ML SOPN Per s/s 2/24/22   Historical Provider, MD   isosorbide dinitrate (ISORDIL) 10 MG tablet Take 10 mg by mouth 3 times daily 4/19/22   Historical Provider, MD   magnesium oxide (MAG-OX) 400 (240 Mg) MG tablet Take 200 mg by mouth daily 3/31/22   Historical Provider, MD   QUEtiapine (SEROQUEL) 25 MG tablet Take 25 mg by mouth 2/24/22   Historical Provider, MD   GLIPIZIDE PO Take 5 mg by mouth daily     Historical Provider, MD       Future Appointments   Date Time Provider Hany Guerra   8/25/2022  9:00 AM Lisa Gardner MD [facility] DP   8/29/2022  9:30 AM Odette Still DPM [facility]   9/6/2022 10:15 AM SCHEDULE, Glenny Robledo [address] [facility] [place]   9/12/2022  1:30 PM Naman Partida MD [facility] CTR DPP   10/5/2022  9:40 AM Clara Camejo MD [facility]

## 2022-08-19 ENCOUNTER — CARE COORDINATION (OUTPATIENT)
Dept: CARE COORDINATION | Age: 77
End: 2022-08-19

## 2022-08-19 NOTE — CARE COORDINATION
Ambulatory Care Coordination Note  8/19/2022    ACC: Ori Barnes, RN    Summary Note: Armando Shelton was referred for [facility]. He was recently transferred to [facility] on 7/22/2022. Per chart review discharged 7/26/2022. This writer attempted to reach 7/27/2022 per phone- LM. Returned to [facility] ED 7/27/2022     He has:            Type II Diabetes- Hgb A1c on 7/2022- 5.7, diet, medications and follows with PCP                           Ischemic cardiomyopathy, CHF- Life vest d/c'd, on medications and follows with cardiology                          CKD- medications and follows with nephrology     [facility], PT/OT through [facility] through the [address] [place] [address] : Continue assessments, education, and support                          CHF and DM Zone Tools                          Medications- she declined review- Trudy Gunter did review and set them up for her                          [facility]. decision maker updated and she has ACP documents at home. She is aware to bring in for EMR                          Reviewed Urgent Care, clinic and My Chart                          Spouse is working with [address] and [place] care through [facility] care available- spouse can call Deidre White at [phone]. She declined at this time                          Dietitian referral was placed by [facility]                          Apt podiatry- wound care 8/29/2022                          Apt cardiology 8/26/2022                          Apt PCP 8/5/2022 completed- next 10/5/2022                          Apt nephrology 9/12/2022                          Apt Vascular- 8/25/2022                          F/U on BS, wt, BP and P                          He was started on Entresto and spouse is going to check with [employer] to see if they qualify for assistance- number given. He does not qualify. She will receive from the [facility]. She can afford their cost through [facility].        Per chart review- continues with Pain Management, vascular apt moved up to 8/25/2022.  8/16/2022- 1:06 pm Left message requesting return call @ [phone].    8/19/2022- 10:16 am spoke with spouse- Nyasia. She said HH was in yesterday. They feel wt up over past month. She denied any increase in swelling or SOB. Wt 192-193 daily. She said seeing cardiology 8/26/2022 at [facility]. He is seeing vascular sooner and she may choose to go to [facility] wound care. She stated he is improving- more alert, up more. She continues to hear from Ness Ayala. His [facility] care is covered by [place] currently. General Assessment    Do you have any symptoms that are causing concern?: No          Diabetes Assessment    Medic Alert ID: No  Meal Planning: Carb counting   How often do you test your blood sugar?: Meals (Comment: BID)   Do you have barriers with adherence to non-pharmacologic self-management interventions?  (Nutrition/Exercise/Self-Monitoring): No   Have you ever had to go to the ED for symptoms of low blood sugar?: No       No patient-reported symptoms   Do you have hyperglycemia symptoms?: No   Do you have hypoglycemia symptoms?: No   Last Blood Sugar Value: 125   Blood Sugar Monitoring Regimen: Before Meals, At Bedtime   Blood Sugar Trends: Fluctuating         and   Congestive Heart Failure Assessment    Do you understand a low sodium diet?: Yes  Do you understand how to read food labels?: Yes  How many restaurant meals do you eat per week?: 0  Do you salt your food before tasting it?: No     No patient-reported symptoms      Symptoms:     Symptom course: stable  Patient-reported weight (lb): 193  Weight trend: stable  Salt intake watch compared to last visit: stable        Lab Results       None            Care Coordination Interventions    Referral from Primary Care Provider: No  Suggested Interventions and Community Resources          Goals Addressed    None         Prior to Admission medications    Medication Sig Start Date End Date Taking? Authorizing Provider   sacubitril-valsartan (ENTRESTO) 24-26 MG per tablet Take 1 tablet by mouth 2 times daily    Historical Provider, MD   Handicap Placard MISC by Does not apply route Good for 5 years 8/5/22   Pinky Huizar MD   metoprolol succinate (TOPROL XL) 25 MG extended release tablet Take 0.5 tablets by mouth daily 7/11/22   Dannie Claudio MD   apixaban (ELIQUIS) 2.5 MG TABS tablet Take 2.5 mg by mouth in the morning and 2.5 mg before bedtime. Historical Provider, MD   pantoprazole (PROTONIX) 40 MG tablet Take 1 tablet by mouth every morning (before breakfast) 7/3/22   Shauna Elizondo MD   furosemide (LASIX) 40 MG tablet Take 1 tablet by mouth 2 times daily 5/13/22   Dannie Claudio MD   Multiple Vitamins-Minerals (PRESERVISION AREDS 2 PO) Take 1 tablet by mouth 2 times daily    Historical Provider, MD   amiodarone (CORDARONE) 200 MG tablet Take 200 mg by mouth daily 2/24/22   Historical Provider, MD   aspirin 81 MG EC tablet Take 81 mg by mouth daily    Historical Provider, MD   atorvastatin (LIPITOR) 40 MG tablet Take 40 mg by mouth nightly  9/30/21   Historical Provider, MD   bisacodyl (DULCOLAX) 10 MG suppository Place 10 mg rectally in the morning.     Historical Provider, MD   ergocalciferol (ERGOCALCIFEROL) 1.25 MG (53248 UT) capsule TAKE ONE CAPSULE BY MOUTH ONCE EVERY WEEK FOR VITAMIN (D) DEFICIENCY 10/12/21   Historical Provider, MD   insulin lispro, 1 Unit Dial, 100 UNIT/ML SOPN Per s/s 2/24/22   Historical Provider, MD   isosorbide dinitrate (ISORDIL) 10 MG tablet Take 10 mg by mouth 3 times daily 4/19/22   Historical Provider, MD   magnesium oxide (MAG-OX) 400 (240 Mg) MG tablet Take 200 mg by mouth daily 3/31/22   Historical Provider, MD   QUEtiapine (SEROQUEL) 25 MG tablet Take 25 mg by mouth 2/24/22   Historical Provider, MD   GLIPIZIDE PO Take 5 mg by mouth daily     Historical Provider, MD       Future Appointments   Date Time Provider Hany Guerra   8/25/2022  9:00 AM Eduardo Hinton MD [facility]   8/29/2022  9:30 AM Roberta Vicente DPM [facility]   9/6/2022 10:15 AM SCHEDULE, [facility] LAB MDHZ LAB [place]   9/12/2022  1:30 PM Darrick Kim MD [facility] CTR M[facility]   10/5/2022  9:40 AM [facility]

## 2022-08-29 ENCOUNTER — CARE COORDINATION (OUTPATIENT)
Dept: CARE COORDINATION | Age: 77
End: 2022-08-29

## 2022-08-29 NOTE — CARE COORDINATION
Ambulatory Care Coordination Note  8/29/2022    ACC: Torri Medina, RN    Summary Note: Lynette Nuñez was referred for [facility]. He was recently transferred to [facility] on 7/22/2022. Per chart review discharged 7/26/2022. This writer attempted to reach 7/27/2022 per phone- LM. Returned to [facility] ED 7/27/2022     He has:            Type II Diabetes- Hgb A1c on 7/2022- 5.7, diet, medications and follows with PCP                           Ischemic cardiomyopathy, CHF- Life vest d/c'd, on medications and follows with cardiology                          CKD- medications and follows with nephrology     [facility], PT[facility]OT [facility] through the [address] [place] [address] of [facility] : Continue assessments, education, and support                          CHF and DM Zone Tools                          Medications- she declined review- Trudy Gunter did review and set them up for her                          [facility] [place]. decision maker updated and she has ACP documents at home. She is aware to bring in for EMR                          Reviewed Urgent Care, clinic and My Chart                          Spouse is working with [address] and [place] care through [facility] care available- spouse can call [facility] at [phone]. She declined at this time                          Dietitian referral placed 8/29/2022                          F/U on wound care                           Apt cardiology 8/26/2022                          Apt PCP 8/5/2022 completed- next 10/5/2022                          Apt nephrology 9/12/2022                          Apt Vascular- 9/9/2022                          F/U on BS, wt, BP, P, wound care and cardiology f/u     8/29/2022- spoke with Nyasia. She was in agreement to referral to dietitian- done. She had no concerns. Wt stable at 194.      General Assessment    Do you have any symptoms that are causing concern?: No          Lab Results       None PO) Take 1 tablet by mouth 2 times daily    Historical Provider, MD   amiodarone (CORDARONE) 200 MG tablet Take 200 mg by mouth daily 2/24/22   Historical Provider, MD   aspirin 81 MG EC tablet Take 81 mg by mouth daily    Historical Provider, MD   atorvastatin (LIPITOR) 40 MG tablet Take 40 mg by mouth nightly  9/30/21   Historical Provider, MD   bisacodyl (DULCOLAX) 10 MG suppository Place 10 mg rectally in the morning.     Historical Provider, MD   ergocalciferol (ERGOCALCIFEROL) 1.25 MG (46075 UT) capsule TAKE ONE CAPSULE BY MOUTH ONCE EVERY WEEK FOR VITAMIN (D) DEFICIENCY 10/12/21   Historical Provider, MD   insulin lispro, 1 Unit Dial, 100 UNIT/ML SOPN Per s/s 2/24/22   Historical Provider, MD   isosorbide dinitrate (ISORDIL) 10 MG tablet Take 10 mg by mouth 3 times daily 4/19/22   Historical Provider, MD   magnesium oxide (MAG-OX) 400 (240 Mg) MG tablet Take 200 mg by mouth daily 3/31/22   Historical Provider, MD   QUEtiapine (SEROQUEL) 25 MG tablet Take 25 mg by mouth 2/24/22   Historical Provider, MD   GLIPIZIDE PO Take 5 mg by mouth daily     Historical Provider, MD       Future Appointments   Date Time Provider Hany Quiñonesisti   9/6/2022 10:15 AM SCHEDULE, [address] LAB MD LAB [place]   9/9/2022  9:00 AM Eduardo Hinton MD [facility]   9/12/2022  1:30 PM Darrick Kim MD [facility] CTR DP   10/5/2022  9:40 AM Laurie Zurita MD [facility]

## 2022-08-30 ENCOUNTER — CARE COORDINATION (OUTPATIENT)
Dept: CARE COORDINATION | Age: 77
End: 2022-08-30

## 2022-08-30 NOTE — CARE COORDINATION
Referral from Earlene Roberts RN-  Please call home number and speak with spouse Nyasia- caregiver. Robles Moses has   Type II Diabetes- Hgb A1c on 7/2022- 5.7, diet, medications and follows with PCP                      Ischemic cardiomyopathy, CHF- Life vest d/c'd, on medications and follows with       cardiology                      CKD- medications and follows with nephrology  , patient has wounds     Will reach out to patient's wife for consult.   NASIR Blount

## 2022-08-30 NOTE — CARE COORDINATION
Registered Dietitian Initial Assessment for Care Coordination      Name-Milan Murphy  August 30, 2022    Initial Referral Reason: CHF/wound    Patient Care Team:  Tess Pettit MD as PCP - General (Family Medicine)  Tess Pettit MD as PCP - [facility] [place] Provider  Val Muñiz RN as [facility] as Consulting Physician (Podiatry)  Barb Nino MD as Consulting Physician (Nephrology)  Pradeep Gannon MD as Consulting Physician (Internal Medicine)  Dino Salas MD as Consulting Physician (Vascular Surgery)  Phan Ash RD, NASIR as Dietitian    Patient Active Problem List   Diagnosis    Type 2 diabetes mellitus with complication ([address]    Tobacco abuse    CKD (chronic kidney disease) stage 4, GFR 15-29 ml/min ([facility])    Uncontrolled type 2 DM with peripheral circulatory disorder (HCC)    PAD (peripheral artery disease) ([address]    Skin ulcer of right great toe, limited to breakdown of skin ([address]    Atherosclerosis of native arteries of extremities with intermittent claudication, bilateral legs (HCC)    GI bleed    Ischemic cardiomyopathy    Paroxysmal atrial fibrillation (HCC)    Acute posthemorrhagic anemia    Current use of long term anticoagulation    Normocytic normochromic anemia    Chronic renal disease, stage III (HCC) [980946]    Chronic systolic (congestive) heart failure    Ulcer of toe, left, limited to breakdown of skin (HCC)       Current Outpatient Medications   Medication Sig Dispense Refill    sacubitril-valsartan (ENTRESTO) 24-26 MG per tablet Take 1 tablet by mouth 2 times daily      Handicap Placard MISC by Does not apply route Good for 5 years 1 each 0    metoprolol succinate (TOPROL XL) 25 MG extended release tablet Take 0.5 tablets by mouth daily 45 tablet 3    apixaban (ELIQUIS) 2.5 MG TABS tablet Take 2.5 mg by mouth in the morning and 2.5 mg before bedtime.       pantoprazole (PROTONIX) 40 MG tablet Take 1 tablet by mouth every morning (before breakfast) 30 tablet 3    furosemide (LASIX) 40 MG tablet Take 1 tablet by mouth 2 times daily 180 tablet 3    Multiple Vitamins-Minerals (PRESERVISION AREDS 2 PO) Take 1 tablet by mouth 2 times daily      amiodarone (CORDARONE) 200 MG tablet Take 200 mg by mouth daily      aspirin 81 MG EC tablet Take 81 mg by mouth daily      atorvastatin (LIPITOR) 40 MG tablet Take 40 mg by mouth nightly       bisacodyl (DULCOLAX) 10 MG suppository Place 10 mg rectally in the morning.      ergocalciferol (ERGOCALCIFEROL) 1.25 MG (23451 UT) capsule TAKE ONE CAPSULE BY MOUTH ONCE EVERY WEEK FOR VITAMIN (D) DEFICIENCY      insulin lispro, 1 Unit Dial, 100 UNIT/ML SOPN Per s/s      isosorbide dinitrate (ISORDIL) 10 MG tablet Take 10 mg by mouth 3 times daily      magnesium oxide (MAG-OX) 400 (240 Mg) MG tablet Take 200 mg by mouth daily      QUEtiapine (SEROQUEL) 25 MG tablet Take 25 mg by mouth      GLIPIZIDE PO Take 5 mg by mouth daily        No current facility-administered medications for this visit.          Visit for:  Obesity/Weight loss  Diabetes:  Hypertension:  Hyperlipidemia:  Other:CHF/wound     Anthropometric Measurements:  HT:5'10  Weight:192  IBW:166 + or - 10%  BMI:27    Biochemical Data, Medical Tests and Procedures:    Lab Results   Component Value Date    LABA1C 5.7 07/05/2022     Lab Results   Component Value Date     07/05/2022       No results found for: CHOL  No results found for: TRIG  No results found for: HDL  No results found for: LDLCALC, LDLCHOLESTEROL  No results found for: LABVLDL, VLDL  No results found for: [place]    Lab Results   Component Value Date    WBC 7.1 08/01/2022    HGB 8.6 (L) 08/01/2022    HCT 27.2 (L) 08/01/2022    MCV 82.9 08/01/2022     08/01/2022       Lab Results   Component Value Date    CREATININE 2.57 (H) 08/01/2022    BUN 37 (H) 08/01/2022     08/01/2022    K 4.6 08/01/2022     08/01/2022    CO2 25 08/01/2022         NUTRITION DIAGNOSIS    #1 Problem  Increased nutrient needs: protein       Etiology  related to wound healing       Signs/Symptoms  as evidenced by patient has open wounds    #2 Problem  Food and nutrition-related knowledge deficit       Etiology  related to referral for nutrition related education       Signs/Symptoms  as evidenced by referral for nutrition education for CHF/CKD    NUTRITION INTERVENTION  Nutrition Prescription:    cardiac diet and diabetic diet providing 2200 kcals/day     Estimated daily CHO Needs: 60 gms/meal, 15-30gms/snack  Protein needs:100-130gms/d  Fluid needs:2L/day or less restriction      Patient Goals: spoke with patient's wife  1. Discussed nutrition for wound healing and encouraged a lean protein source at each meal. Discussed lean meats, eggs, lowfat cottage cheese. We also discussed using a daily nutritional supplement. Will send patient Glucerna coupons. 2. Reviewed what foods contain carbohydrate to limit and how to measure out portions. Encouraged patient to limit carb intake to 60gms/meal, 15-30gms/snack. Discussed low carb snacking options and will send patient lists of low carb snacks. 3. Discussed plate method, encouraged patient to increase intake of non-starchy vegetables. Goal is 1/2 of  plate to be non-starchy vegetables, 1/4 lean protein, 1/4 carbs. Foods from each category reviewed along with what a serving size is  4. Discussed avoiding/limiting sweets and sweetened drinks. Patient relays drinks mostly water, avoids sugary drinks. Patient also states avoids sweets. Discussed  fluid restriction- encouraged to limit fluids to <2L/day. Patient's wife states they are measuring out fluids and limiting. 5. Discussed avoiding canned, prepackaged foods, processed meats and cheese. Discussed processed meats in detail to avoid- sausage, ordaz, bologna, ham, ect- patient was eating some of these. Discussed how to read food labels to identify foods to high in sodium to avoid.  Goal is <2gm of sodium/day   6. Discussed the need to avoid foods high in potassium and phosphorous. Patient will be provided with a list of foods to avoid/limit. Nutrition Intervention Need:  Initial/Brief:  Comprehensive Nutrition Education:X  Coordination of other care during nutrition care:    Monitoring and Evaluation:  Ability to plan meals/snacks:  Ability to select healthy foods/meals:X  Food and Nutrition Knowledge:X  Self Monitoring:  Physical Activity:  Energy intake:  Fluid/beverage intake:  Fat/chol intake:  Carb intake: Other:protein intake: X    Plan: 1. Will continue to educate patient/patient's wife on reading food labels, measuring out portions, low carb snacking, plate method, increasing lean protein sources for wound healing, low sodium guidelines, fluid restriction and foods to avoid/limit high in potassium/phosphorus. Will mail patient nutrition information on all the above discussed. 2. Will follow up in 2-3 weeks to review nutrition information and answer questions.        NASIR Avendaño

## 2022-08-30 NOTE — CARE COORDINATION
Radha stauffer,  Nutrition information on wound healing, CHF educational material mailed to patient per request of Zurdo Gage.

## 2022-09-12 ENCOUNTER — OFFICE VISIT (OUTPATIENT)
Dept: NEPHROLOGY | Age: 77
End: 2022-09-12
Payer: MEDICARE

## 2022-09-12 ENCOUNTER — HOSPITAL ENCOUNTER (OUTPATIENT)
Dept: LAB | Age: 77
Discharge: HOME | End: 2022-09-12
Payer: MEDICARE

## 2022-09-12 VITALS
HEART RATE: 72 BPM | WEIGHT: 194 LBS | SYSTOLIC BLOOD PRESSURE: 114 MMHG | BODY MASS INDEX: 27.77 KG/M2 | DIASTOLIC BLOOD PRESSURE: 62 MMHG | HEIGHT: 70 IN

## 2022-09-12 DIAGNOSIS — N18.4 CKD (CHRONIC KIDNEY DISEASE) STAGE 4, GFR 15-29 ML/MIN (HCC): ICD-10-CM

## 2022-09-12 DIAGNOSIS — R60.0 BILATERAL LOWER EXTREMITY EDEMA: ICD-10-CM

## 2022-09-12 DIAGNOSIS — I10 HYPERTENSION, ESSENTIAL: ICD-10-CM

## 2022-09-12 DIAGNOSIS — N18.4 CKD (CHRONIC KIDNEY DISEASE) STAGE 4, GFR 15-29 ML/MIN (HCC): Primary | ICD-10-CM

## 2022-09-12 DIAGNOSIS — N18.32 TYPE 2 DIABETES MELLITUS WITH STAGE 3B CHRONIC KIDNEY DISEASE, WITHOUT LONG-TERM CURRENT USE OF INSULIN (HCC): ICD-10-CM

## 2022-09-12 DIAGNOSIS — E55.9 VITAMIN D DEFICIENCY: ICD-10-CM

## 2022-09-12 DIAGNOSIS — Z87.19 HISTORY OF GI BLEED: ICD-10-CM

## 2022-09-12 DIAGNOSIS — E83.42 HYPOMAGNESEMIA: ICD-10-CM

## 2022-09-12 DIAGNOSIS — E87.6 HYPOKALEMIA: ICD-10-CM

## 2022-09-12 DIAGNOSIS — E11.22 TYPE 2 DIABETES MELLITUS WITH STAGE 3B CHRONIC KIDNEY DISEASE, WITHOUT LONG-TERM CURRENT USE OF INSULIN (HCC): ICD-10-CM

## 2022-09-12 DIAGNOSIS — I25.5 ISCHEMIC CARDIOMYOPATHY: ICD-10-CM

## 2022-09-12 LAB
ANION GAP SERPL CALCULATED.3IONS-SCNC: 11 MMOL/L (ref 9–17)
BUN BLDV-MCNC: 37 MG/DL (ref 8–23)
BUN/CREAT BLD: 16 (ref 9–20)
CALCIUM SERPL-MCNC: 8.4 MG/DL (ref 8.6–10.4)
CHLORIDE BLD-SCNC: 98 MMOL/L (ref 98–107)
CO2: 25 MMOL/L (ref 20–31)
CREAT SERPL-MCNC: 2.38 MG/DL (ref 0.7–1.2)
GFR AFRICAN AMERICAN: 32 ML/MIN
GFR NON-AFRICAN AMERICAN: 27 ML/MIN
GFR SERPL CREATININE-BSD FRML MDRD: ABNORMAL ML/MIN/{1.73_M2}
GLUCOSE BLD-MCNC: 125 MG/DL (ref 70–99)
POTASSIUM SERPL-SCNC: 4.2 MMOL/L (ref 3.7–5.3)
SODIUM BLD-SCNC: 134 MMOL/L (ref 135–144)

## 2022-09-12 PROCEDURE — 99213 OFFICE O/P EST LOW 20 MIN: CPT | Performed by: INTERNAL MEDICINE

## 2022-09-12 PROCEDURE — 80048 BASIC METABOLIC PNL TOTAL CA: CPT

## 2022-09-12 PROCEDURE — 1124F ACP DISCUSS-NO DSCNMKR DOCD: CPT | Performed by: INTERNAL MEDICINE

## 2022-09-12 PROCEDURE — 3044F HG A1C LEVEL LT 7.0%: CPT | Performed by: INTERNAL MEDICINE

## 2022-09-12 PROCEDURE — 1036F TOBACCO NON-USER: CPT | Performed by: INTERNAL MEDICINE

## 2022-09-12 PROCEDURE — G8427 DOCREV CUR MEDS BY ELIG CLIN: HCPCS | Performed by: INTERNAL MEDICINE

## 2022-09-12 PROCEDURE — 36415 COLL VENOUS BLD VENIPUNCTURE: CPT

## 2022-09-12 PROCEDURE — G8417 CALC BMI ABV UP PARAM F/U: HCPCS | Performed by: INTERNAL MEDICINE

## 2022-09-12 PROCEDURE — 99214 OFFICE O/P EST MOD 30 MIN: CPT | Performed by: INTERNAL MEDICINE

## 2022-09-12 RX ORDER — FAMOTIDINE 20 MG/1
20 TABLET, FILM COATED ORAL DAILY
COMMUNITY
Start: 2022-03-17

## 2022-09-12 NOTE — PROGRESS NOTES
Nephrology Progress Note    Laurie Gary MD      SUBJECTIVE      Chief Complaint   Patient presents with    Chronic Kidney Disease     CKD (chronic kidney disease) stage 4, GFR 15-29 ml/min ([facility] [address]     9/12/2022:  Patient is here for follow-up of his CKD 4 likely due to diabetic and hypertensive nephrosclerosis and possibly complicated by atheroembolic disease from cardiac catheterization 02/09/2022 along with h/o multiple GI bleeds with baseline creatinine now seems to be around 2 to 2.3 mg/DL. Patient does have history of GI bleed and has had multiple admissions due to the same. Patient also has history of cardiomyopathy and has LifeVest in place. Follows up with vascular surgery and I was contacted by nurse practitioner through vascular surgery service (Maricel Koo) as they possibly will need to do an angiogram with contrast for nonhealing bilateral lower extremity wounds. Patient doing well. No new issues. No fever, no chills, no CP, no SOB, no N/V/D, no abdomen pain, no urinary complaints, +ve LE edema. He is currently on Lasix 40 mg p.o. twice daily, Entresto 1 tablet twice a day, Isordil 10 mg BID, vitamin D 50,000 units weekly, magnesium oxide 200 mg daily. Patient is currently not taking metoprolol XL 12.5 mg daily and hydralazine 10 mg 3 times a day due to low BP. Last labs 9/12/2022: BUN/Cr 37/2.38, Na 134, K 4.2, Cl 98, Bicarb 25, Ca 8.4,   8/1/2022: Phos 4.1, .9, Vit D 58.5, Hb 8.6, UPC 0.18. BP today 114/62, HR 72.     7/11/2022:  Patient is here for follow-up of his CKD 4 likely due to diabetic and hypertensive nephrosclerosis and possibly complicated by atheroembolic disease from cardiac catheterization 02/09/2022 along with h/o multiple GI bleeds with baseline creatinine now seems to be around 2 to 2.3 mg/DL. Patient does have history of GI bleed and has had multiple admissions due to the same.   He was just recently admitted at [facility] and discharged on 7/3/2022 and was transferred from outlying facility with hemoglobin of 4.9. Patient did receive blood transfusions and got EGD done on 6/30/2022 showed 3 cm hiatal hernia no obvious evidence of bleeding. Colonoscopy to be done as outpatient. Patient's hemoglobin had remained stable and was discharged to follow-up as outpatient. Last hemoglobin on 7/5/2020 was 7.8. Patient also has history of cardiomyopathy and has LifeVest in place. Patient currently seems to be doing better. No new issues. No fever, no chills, no CP, no SOB, no N/V/D, no abdomen pain, no urinary complaints, +ve LE edema. He is currently on Lasix 40 mg p.o. twice daily, hydralazine 10 mg 3 times a day, Isordil 10 mg 3 times a day, metoprolol XL 25 mg daily, vitamin D 50,000 units weekly, magnesium oxide 200 mg daily. Last labs 7/5/2022: BUN/Cr 26/2.10, Na 135, K 3.5, Cl 98, Bicarb 23, Ca 8.8, Phos 4.3, .5, Vit D 48.7, Hb 7.8, UPC 0.14 (7/7/2022). Blood pressure today 88/52, heart rate 56.      5/13/2022:  Patient is here as a hospital follow-up. He was admitted to [facility] in April 2022, went to the hospital due to symptoms of black tarry stools and found to have GI bleed requiring blood transfusion. Patient did have colonoscopy done 04/12/2022 status post cold forceps, cold snare, heart near polypectomy due to melena. Found to have polyps and internal hemorrhoids. He also suffered acute kidney injury secondary ischemic ATN from intravascular volume depletion related to blood loss anemia requiring blood transfusion along with hypotension and low flow state. Baseline creatinine prior to open-heart surgery about 2 months ago was 1-1.1 subsequently creatinine on discharge was 1.5 on 02/16/2022 and more recently about 10 days ago per patient his [address] had told him that he has developed kidney disease.  From the [place] records it looks like his creatinine was 2.28 mg/dL on 03/31/2022 and before that on 09/20/2021 it was 1.0 and previously 0.85 mg/dL in 8/2021. Creatinine on admission was 3.43 mg/dL, and then seem to have plateaued around 5.1FD/XO. His creatinine on discharge was 2.20 mg/dl (4/15/2022). Now most recent creatinine was 2.12 mg/dl on 4/29/2022. Patient also has history of CKD 3 likely due to diabetic and hypertensive nephrosclerosis and possibly complicated by atheroembolic disease from cardiac catheterization 02/09/2022 with baseline creatinine now seems to be around 2 to 2.3 mg/DL, diabetes type 2, hypertension, ischemic cardiomyopathy with EF of around 20 to 25% as per 2D echo done on 2/14/2022, coronary artery disease with history of CABG, atrial fibrillation, hypomagnesemia. Serology 04/10/2022: JOB negative. SPEP and immunofixation- Presence of monoclonal protein is unclear at this time. Suggest repeat in 3 to 6 months if clinically indicated. C3 71.1 and C 4 29.4. K/L 1.44. UPC 0.3. Urine eosinophiles negative. Renal ultrasound 04/11/2022: Right kidney 10.3 cm and left kidney 12.6 cm. Impression: Normal renal ultrasound. Cholelithiasis. Patient also has diabetic foot wound right toe and now follows up with wound care and podiatry. Patient doing well. No new issues. No fever, no chills, no CP, no SOB, no N/V/D, no abdomen pain, no urinary complaints, +ve LE edema and right tow wound. He is currently on Lasix 40 mg daily, hydralazine 10 mg 3 times a day, Isordil 10 mg 3 times a day, metoprolol XL 25 mg daily, vitamin D 50,000 units weekly, magnesium oxide 200 mg daily. Last labs 4/29/2020: BUN/Cr 25/2.12, Na 137, K 4.0, Cl 101, Bicarb 25, Ca 8.8, Hb 8.0.  BP today 116/68, HR 68. History of present illness from initial hospital visit on 4/10/2022:  \"Known history of coronary artery disease history of coronary bypass graft 02/09/2022. Prior to that underwent cardiac catheterization 02/08/2022 for workup of cardiomyopathy (EF 20-25%) and was found to have multivessel disease.   His creatinine prior to the procedure was in the 1.0-1.1 range. Post open heart surgery and on discharge on 16th February 2022 his creatinine was 1.5. Subsequently about 2 weeks ago he was seen by his [address] and was told that his creatinine was high, I do not have the exact lab results available to me. He also has known history of type 2 diabetes, previous history of CVA, persistent smoking and hypertension. Patient has been on dual antiplatelet agents, also has been on metformin and Lasix. He now comes into the hospital with 2-3 day history of noticing black stools. Initially symptoms began 04/07/2022, patient and his wife did note them subsequently on 04/09/2022 with bleeding was much more severe and the melanotic stools were much more significant and associated with diarrhea. Was taken into the ER at [place]. Labs showed that he had a hemoglobin of 8.5 which is a drop from hemoglobin of 10.5 earlier. Subsequently it dropped down to 7.1. In addition he was hypotensive, with systolic pressure in the  range. Labs also showed that he had a creatinine of 3.5. Was then transferred to [facility], is being transfused, nephrology consulted for acute kidney injury. At the time evaluation patient denies any complaints. No shortness of breath, chest pain, PND, orthopnea. No cough phlegm hemoptysis. No fever chills. No nausea vomiting. Denies any digital infarcts, discoloration in his toes. No uncontrolled blood sugars no history of NSAID use. Labs this morning showed a creatinine of 3.1 potassium 4.8 bicarb 17 anion gap 11 calcium 7.9  Home medicines were reviewed, he has not had any Ace inhibitors does take loop diuretics. \"      Patient Active Problem List    Diagnosis Date Noted    Ulcer [facility] toe, left, [facility] 07/11/2022     Priority: Medium    Chronic renal disease, stage III [facility]) [[id number]] 07/05/2022     Priority: Medium    Chronic systolic (congestive) heart failure 07/05/2022     Priority: Medium    Acute posthemorrhagic anemia      Priority: Medium    Current use of long term anticoagulation      Priority: Medium    Normocytic normochromic anemia      Priority: Medium    GI bleed 06/30/2022     Priority: Medium    Ischemic cardiomyopathy 06/30/2022     Priority: Medium    Paroxysmal atrial fibrillation ([facility] 06/30/2022     Priority: Medium    Atherosclerosis of native arteries of extremities with intermittent claudication, bilateral legs ([facility] 05/26/2022     Priority: Medium    Uncontrolled type 2 DM with peripheral circulatory disorder ([facility] 05/09/2022     Priority: Medium    PAD (peripheral artery disease) ([facility] 05/09/2022     Priority: Medium    Skin ulcer of right great toe, limited to breakdown of skin ([facility] 05/09/2022     Priority: Medium    CKD (chronic kidney disease) stage 4, GFR 15-29 ml/min ([facility] 04/29/2022     Priority: Medium    Type 2 diabetes mellitus with complication (HCC)     Tobacco abuse          CURRENT MEDICATIONS      Current Outpatient Medications   Medication Sig Dispense Refill    famotidine (PEPCID) 20 MG tablet Take 20 mg by mouth daily      sacubitril-valsartan (ENTRESTO) 24-26 MG per tablet Take 1 tablet by mouth 2 times daily      Handicap Placard MISC by Does not apply route Good for 5 years 1 each 0    apixaban (ELIQUIS) 2.5 MG TABS tablet Take 2.5 mg by mouth in the morning and 2.5 mg before bedtime.       pantoprazole (PROTONIX) 40 MG tablet Take 1 tablet by mouth every morning (before breakfast) 30 tablet 3    furosemide (LASIX) 40 MG tablet Take 1 tablet by mouth 2 times daily 180 tablet 3    Multiple Vitamins-Minerals (PRESERVISION AREDS 2 PO) Take 1 tablet by mouth 2 times daily      amiodarone (CORDARONE) 200 MG tablet Take 200 mg by mouth daily      aspirin 81 MG EC tablet Take 81 mg by mouth daily      atorvastatin (LIPITOR) 40 MG tablet Take 40 mg by mouth nightly       ergocalciferol (ERGOCALCIFEROL) 1.25 MG (64077 UT) capsule TAKE ONE CAPSULE BY MOUTH ONCE EVERY WEEK FOR VITAMIN (D) DEFICIENCY      insulin lispro, 1 Unit Dial, 100 UNIT/ML SOPN Per s/s      isosorbide dinitrate (ISORDIL) 10 MG tablet Take 10 mg by mouth 3 times daily      magnesium oxide (MAG-OX) 400 (240 Mg) MG tablet Take 200 mg by mouth daily      QUEtiapine (SEROQUEL) 25 MG tablet Take 25 mg by mouth      GLIPIZIDE PO Take 5 mg by mouth daily       metoprolol succinate (TOPROL XL) 25 MG extended release tablet Take 0.5 tablets by mouth daily (Patient not taking: Reported on 9/12/2022) 45 tablet 3     No current facility-administered medications for this visit. REVIEW OF SYSTEMS     Constitutional: No fever, no chills, no lethargy, no weakness. HEENT:  No headache, otalgia, itchy eyes, nasal discharge or sore throat. Cardiac:  No chest pain, dyspnea, orthopnea or PND. Chest:   No cough, phlegm or wheezing or hemoptysis . Abdomen:  No abdominal pain, nausea or vomiting. Neuro:  No focal weakness, abnormal movements or seizure like activity. Skin:   No rashes, no itching. :   No hematuria, no pyuria, no dysuria, no flank pain. Extremities:  +ve b/L swelling or joint pains. ROS was otherwise negative except as mentioned in the [place]. OBJECTIVE      Vitals:    09/12/22 1336   BP: 114/62   Pulse: 72     Wt Readings from Last 2 Encounters:   09/12/22 194 lb (88 kg)   08/15/22 192 lb (87.1 kg)     Body mass index is 27.84 kg/m².      PHYSICAL EXAM      GENERAL APPEARANCE: awake, alert, in no acute distress  SKIN: warm and dry, no rash or erythema  EYES: conjunctivae normal and sclera anicteric  ENT: no thrush no pharyngeal congestion   NECK: supple    PULMONARY: clear to auscultation and no wheezing noted   CADRDIOVASCULAR: normal S1 & S2, no murmur, +ve life vest   ABDOMEN: soft nontender, bowel sounds, present, no organomegaly, no ascites   EXTREMITIES: no cyanosis, clubbing 1-2+ b/L edema  NEURO: alert and oriented, no deficits    LABS    CBC:   Lab Results Component Value Date/Time    WBC 7.1 08/01/2022 10:00 AM    HGB 8.6 08/01/2022 10:00 AM    HCT 27.2 08/01/2022 10:00 AM    MCV 82.9 08/01/2022 10:00 AM    RDW 18.5 08/01/2022 10:00 AM     08/01/2022 10:00 AM    MPV 9.9 08/01/2022 10:00 AM      BMP:   Lab Results   Component Value Date/Time     09/12/2022 11:30 AM    K 4.2 09/12/2022 11:30 AM    CL 98 09/12/2022 11:30 AM    CO2 25 09/12/2022 11:30 AM    BUN 37 09/12/2022 11:30 AM    CREATININE 2.38 09/12/2022 11:30 AM    GLUCOSE 125 09/12/2022 11:30 AM    CALCIUM 8.4 09/12/2022 11:30 AM      PHOSPHORUS:    Lab Results   Component Value Date/Time    PHOS 4.1 08/01/2022 10:00 AM     MAGNESIUM:   Lab Results   Component Value Date/Time    MG 2.3 08/01/2022 10:00 AM     ALBUMIN:   Lab Results   Component Value Date/Time    LABALBU 3.5 07/27/2022 03:14 PM     PTH: No components found for: PTHINTACT  VIT D3,25 HYDROXY: No results found for: VITD3     IRON:  No results found for: IRON  IRON SATURATION:  No results found for: LABIRON  TIBC:  No results found for: TIBC  FERRITIN:  No results found for: FERRITIN          JOB: No results found for: JOB    SPEP:   Lab Results   Component Value Date/Time    PROT 5.8 08/01/2022 10:00 AM    ALBCAL 3.7 08/01/2022 10:00 AM    ALBPCT 63 08/01/2022 10:00 AM    LABALPH 0.2 08/01/2022 10:00 AM    LABALPH 0.7 08/01/2022 10:00 AM    A1PCT 3 08/01/2022 10:00 AM    A2PCT 12 08/01/2022 10:00 AM    LABBETA 0.6 08/01/2022 10:00 AM    BETAPCT 10 08/01/2022 10:00 AM    GAMGLOB 0.7 08/01/2022 10:00 AM    GGPCT 12 08/01/2022 10:00 AM    PATH ELECTRONICALLY SIGNED. Hawk Curry M.D. 08/01/2022 10:00 AM    PATH ELECTRONICALLY SIGNED. Hawk Curry M.D. 08/01/2022 10:00 AM     UPEP: No results found for: TPU   HEPBSAG:No results found for: HEPBSAG  HEPCAB:No results found for: HEPCAB  C3: No results found for: C3  C4: No results found for: C4  MPO ANCA: No results found for: MPO .   PR3 ANCA:  No results found for: PR3 URINALYSIS/URINE CHEMISTRIES      URINE CREATININE:    Lab Results   Component Value Date/Time    LABCREA 106.8 08/01/2022 10:01 AM     URINE EOSINOPHILS : No results found for: UREO  URINE PROTEIN:  No results found for: TPU        RADIOLOGY    No results found for this or any previous visit. ASSESSMENT     1. CKD 4 likely due to diabetic and hypertensive nephrosclerosis and possibly complicated by atheroembolic disease from cardiac catheterization 02/09/2022 along with h/o multiple GI bleeds with baseline creatinine now seems to be around 2 to 2.3 mg/DL. Last creatinine was 2.38 mg/DL on 9/12/2022. Serology 04/10/2022: JOB negative. SPEP and immunofixation- Presence of monoclonal protein is unclear at this time. Suggest repeat in 3 to 6 months if clinically indicated. C3 71.1 and C 4 29.4. K/L 1.44. UPC 0.3. Urine eosinophiles negative. Repeat SPEP and immunofixation negative, K/L 1.23 (8/1/2022). Renal ultrasound 04/11/2022: Right kidney 10.3 cm and left kidney 12.6 cm. Impression: Normal renal ultrasound. Cholelithiasis. 2. Hypertension, long term. BP Readings from Last 3 Encounters:   09/12/22 114/62   08/15/22 118/60   08/05/22 (!) 100/50    :  3. Diabetes type 2, long term. 4.  Ischemic cardiomyopathy with a EF of around 20 to 25% as per 2D echo done on 2/14/2022. Has life vest on and [facility] [address] [facility]. 5.  Coronary artery disease with history of CABG. 6.  Atrial fibrillation. 7.  Hypomagnesemia. 8.  Vitamin D deficiency. 9.  Bilateral lower extremity edema. 10.  Bilateral feet nonhealing wounds and follows up with vascular surgery. PLAN     1. Based on available labs patients renal function is stable. Patient's volume status is  acceptable. Importance of tight blood pressure, glycemic control, lipid control was outlined to patient. 2.  Continue Lasix to 40 mg BID.   3.  Cont hydralazine 10 mg 3 times a day (has hold parameters to hold if SBP less than 120), Isordil 10 mg 3 times a day. 4.  Continue vitamin D 50,000 units weekly for now. 5.  Continue magnesium oxide 200 mg daily. 6.  Keep well-hydrated. 7   Cont to follow up with GI.  8.  Continue to follow-up with vascular surgery and if patient does need contrast study, he should receive minimal amount of contrast as much as possible, should hold his Entresto and Lasix at least a day before the dye exposure and cont to hold Entresto for 3 days post procedure, if okay with cardiology. He should also receive IV fluids about 75 cc an hour 6 hours before and 6 hours after contrast dye. Should also repeat BMP in 3 days postprocedure. Contrast related renal risks all the way up to requiring dialysis was explained to the patient and his wife in detail. They both verbalized understanding. Will update vascular surgery with above recommendations. 10. Follow up labs ordered : bmp, cbc, mg, phos, intact pth, vitaminD 25 OH. 11. Urine for random protein and creat to be done. 12. Cont to follow up with Cardio, and will be getting an ACID soon. 13. BMP monthly. 14. RTC in 3-4 months. Patient was asked to avoid NSAIDS. Please do not hesitate to call with questions. Electronically signed by Agapito Calderon MD on 9/12/2022 at 2:30 PM  [facility]     This note is created with the assistance of a speech-recognition program. While intending to generate a document that actually reflects the content of the visit, no guarantees can be provided that every mistake has been identified and corrected by editing.

## 2022-09-19 ENCOUNTER — CARE COORDINATION (OUTPATIENT)
Dept: CARE COORDINATION | Age: 77
End: 2022-09-19

## 2022-09-19 NOTE — CARE COORDINATION
Nutrition Care Coordinator Follow-Up visit:    Food Recall:eating 3 meals/d    Activity Level:  Sedentary:X  Lightly Active: Moderately Active:  Very Active:    Adult BMI:  Underweight (below 18.5)  Normal Weight (18.5-24.9)  Overweight (25-29.9)X  Obese (30-39. 9)  Morbidly Obese (>40)    Weight Change:up 2#     Plan:  Plan was established with patient:  Increase dietary fiber by consuming whole grains, fruits and vegetables:  Limit dietary cholesterol to >200mg/day: Increase water intake:  Avoid added sugar:  Avoid sweetened beverages:  Choose lean meats:      Monitoring: Will monitor weight:  Will monitor adherence to meal plan: Will monitor adherence to exercise plan: Will monitor HGA1c:    Handouts Provided :  Low Carb snacking:X  Carb counting /individual meal plan:X  Portion Control:  Food Labels:  Physical Activity:  Low Fat/Cholesterol:  Hypo/Hyperglycemia:  Calorie Controlled Meal Plan  DASH guidelines: X    Goals: Increase water consumption to 8oz. 6-8 times daily:  Manage blood sugars by consuming 3 meals spaced every 4-5 hours with 2-3 snacks daily:reviewed  Increase fiber and decrease fat intake by consuming 1-2 fruit servings and 2-3 vegetable servings per day. Increase physical activity by:  Consume less than 2,000mg of sodium/day: reviewed  Avoid consumption of sweetened beverages and added sugar by reading food labels:reviewed  Monitor blood sugars by using meter to check blood glucose before morning meal and 2 hours after a meal daily:  Decrease risk of coronary heart disease by consuming fish that contains omega-3 fatty acids at least twice a week, avoiding partially hydrogenated oil/trans fats and limiting saturated fat intake by reading food labels:reviewed    Patient goals set: 1. Reviewed nutrition for wound healing and encouraged a lean protein source at each meal. Reviewed lean meats, eggs, lowfat cottage cheese.  We also discussed using a daily nutritional supplement, they are adding protein powder to meals but agreed to use coupons to purchase Glucerna or Ensure. 2. Reviewed what foods contain carbohydrate to limit and how to measure out portions. Encouraged patient to limit carb intake to 60gms/meal, 15-30gms/snack. Reviewed low carb snacking options and will send patient lists of low carb snacks. 3. Reviewed plate method, encouraged patient to increase intake of non-starchy vegetables. Goal is 1/2 of  plate to be non-starchy vegetables, 1/4 lean protein, 1/4 carbs. Foods from each category reviewed along with what a serving size is  4. Reviewed avoiding/limiting sweets and sweetened drinks. Patient relays drinks mostly water, avoids sugary drinks. Patient also states avoids sweets. Discussed  fluid restriction- encouraged to limit fluids to <2L/day. Patient's wife states they are measuring out fluids and limiting. 5. Reviewed avoiding canned, prepackaged foods, processed meats and cheese. Reviewed processed meats in detail to avoid- sausage, ordaz, bologna, ham, ect- patient was eating some of these. Discussed how to read food labels to identify foods to high in sodium to avoid. Goal is <2gm of sodium/day   6. Reviewed the need to avoid foods high in potassium and phosphorous. Patient will be provided with a list of foods to avoid/limit. Spoke with patient who relays he is doing ok, wife not available at time of call. They did get nutrition information- encouraged to look over info on nutrition and wound healing. He is limiting fluids to <2L/day. Patient had no questions, encouraged him to let his wife know she is welcome to call any time with questions. Will follow up in 3-4 weeks to review and answer questions.   Abram Fan

## 2022-09-20 ENCOUNTER — CARE COORDINATION (OUTPATIENT)
Dept: CARE COORDINATION | Age: 77
End: 2022-09-20

## 2022-09-20 NOTE — CARE COORDINATION
Ambulatory Care Coordination Note  9/20/2022    ACC: Cm Ramires, RN    Summary Note: Darnell Walker was referred for [facility]. He has:            Type II Diabetes- Hgb A1c on 7/2022- 5.7, diet, medications and follows with PCP                           Ischemic cardiomyopathy, CHF- Life vest d/c'd, on medications and follows with cardiology                          CKD- medications and follows with nephrology    Folowing with vascular surgeon for PAD, wound care at [facility]     [facility]  [facility] care- nursing, PT/OT through [facility] through the [address] [place] [address] : Continue assessments, education, and support                          CHF and DM Zone Tools                          Medications- she declined review- Ellyncristo Gunter did review and set them up for her                          Aura decision maker updated and she has ACP documents at home. She is aware to bring in for EMR                          Reviewed Urgent Care, clinic and My Chart                          Spouse is working with [place] [address] and [facility] through [facility] care available- spouse can call [facility] at [phone]. She declined at this time                          Dietitian referral placed 8/29/2022                          F/U on wound care at [address] cardiology 8/26/2022 completed- no changes                          Apt PCP 8/5/2022 completed- next 10/5/2022                          Apt nephrology 9/12/2022- completed. F/U 1/9/2023                          Apt Vascular- 9/9/2022- completed. F/U after scan that was done today. Apt at [place] in [facility] 9/21/2022                          F/U on BS, wt, BP, P, wound care     9/20/2022- 4:32 pm spoke with Nyasia. He is following with wound care at [facility]. Dressing changes daily and [facility] care assists. She stated wt stable 192-194, /54. She stated he is doing okay. [facility] apt tomorrow. [facility] saw [facility] surgeon in [facility]. Testing roday. He may want to do surgery to help with circulation to legs and to assist with wound healing. General Assessment    Do you have any symptoms that are causing concern?: Yes  Progression since Onset: Gradually Improving  Reported Symptoms: Other (Comment: wounds)        Diabetes Assessment    Medic Alert ID: No  Meal Planning: Carb counting   How often do you test your blood sugar?: Meals (Comment: BID)   Do you have barriers with adherence to non-pharmacologic self-management interventions? (Nutrition/Exercise/Self-Monitoring): No   Have you ever had to go to the ED for symptoms of low blood sugar?: No       No patient-reported symptoms         and   Congestive Heart Failure Assessment    Do you understand a low sodium diet?: Yes  Do you understand how to read food labels?: Yes  How many restaurant meals do you eat per week?: 0  Do you salt your food before tasting it?: No     No patient-reported symptoms      Symptoms:     Symptom course: stable  Patient-reported weight (lb): 193  Weight trend: fluctuating minimally  Salt intake watch compared to last visit: stable        Lab Results       None            Care Coordination Interventions    Referral from Primary Care Provider: No  Suggested Interventions and Community Resources  Disease Specific Clinic: Completed (Comment: wound care)  Home Health Services: Completed  Medication Assistance Program: Completed (Comment: through [place])  Occupational Therapy: Completed  Physical Therapy: Completed  Zone Management Tools: In Process (Comment: DM, CHF)          Goals Addressed                   This Visit's Progress     Conditions and Symptoms   On track     I will schedule office visits, as directed by my provider. I will keep my appointment or reschedule if I have to cancel. I will notify my provider of any barriers to my plan of care. I will follow my Zone Management tool to seek urgent or emergent care.   I will notify my provider of any symptoms that indicate a worsening of my condition. Barriers: lack of support and lack of education  Plan for overcoming my barriers: Care Coordination Intervention, [facility], and assistance from Ness Ayala and [place]  Confidence: 10/10  Anticipated Goal Completion Date: 10/28/2022                Prior to Admission medications    Medication Sig Start Date End Date Taking? Authorizing Provider   famotidine (PEPCID) 20 MG tablet Take 20 mg by mouth daily 3/17/22   Historical Provider, MD   sacubitril-valsartan (ENTRESTO) 24-26 MG per tablet Take 1 tablet by mouth 2 times daily    Historical Provider, MD   Handicap Placard MISC by Does not apply route Good for 5 years 8/5/22   Zuly Mederos MD   metoprolol succinate (TOPROL XL) 25 MG extended release tablet Take 0.5 tablets by mouth daily  Patient not taking: Reported on 9/12/2022 7/11/22   Vinod Newell MD   apixaban (ELIQUIS) 2.5 MG TABS tablet Take 2.5 mg by mouth in the morning and 2.5 mg before bedtime.     Historical Provider, MD   pantoprazole (PROTONIX) 40 MG tablet Take 1 tablet by mouth every morning (before breakfast) 7/3/22   Oscar Aragon MD   furosemide (LASIX) 40 MG tablet Take 1 tablet by mouth 2 times daily 5/13/22   Vinod Newell MD   Multiple Vitamins-Minerals (PRESERVISION AREDS 2 PO) Take 1 tablet by mouth 2 times daily    Historical Provider, MD   amiodarone (CORDARONE) 200 MG tablet Take 200 mg by mouth daily 2/24/22   Historical Provider, MD   aspirin 81 MG EC tablet Take 81 mg by mouth daily    Historical Provider, MD   atorvastatin (LIPITOR) 40 MG tablet Take 40 mg by mouth nightly  9/30/21   Historical Provider, MD   ergocalciferol (ERGOCALCIFEROL) 1.25 MG (30167 UT) capsule TAKE ONE CAPSULE BY MOUTH ONCE EVERY WEEK FOR VITAMIN (D) DEFICIENCY 10/12/21   Historical Provider, MD   insulin lispro, 1 Unit Dial, 100 UNIT/ML SOPN Per s/s 2/24/22   Historical Provider, MD   isosorbide dinitrate (ISORDIL) 10 MG tablet Take 10 mg by mouth 3 times daily 4/19/22   Historical Provider, MD   magnesium oxide (MAG-OX) 400 (240 Mg) MG tablet Take 200 mg by mouth daily 3/31/22   Historical Provider, MD   QUEtiapine (SEROQUEL) 25 MG tablet Take 25 mg by mouth 2/24/22   Historical Provider, MD   GLIPIZIDE PO Take 5 mg by mouth daily     Historical Provider, MD       Future Appointments   Date Time Provider Hany Guerra   10/5/2022  9:40 AM Erik Dillon MD [facility]   1/9/2023 10:50 AM Melissa Tan MD [facility]

## 2022-09-22 ENCOUNTER — CARE COORDINATION (OUTPATIENT)
Dept: CARE COORDINATION | Age: 77
End: 2022-09-22

## 2022-09-22 ENCOUNTER — TELEPHONE (OUTPATIENT)
Dept: FAMILY MEDICINE CLINIC | Age: 77
End: 2022-09-22

## 2022-09-22 ENCOUNTER — HOSPITAL ENCOUNTER (EMERGENCY)
Age: 77
Discharge: HOME | End: 2022-09-22
Attending: EMERGENCY MEDICINE
Payer: MEDICARE

## 2022-09-22 VITALS
DIASTOLIC BLOOD PRESSURE: 37 MMHG | RESPIRATION RATE: 19 BRPM | TEMPERATURE: 96.2 F | HEIGHT: 70 IN | HEART RATE: 58 BPM | BODY MASS INDEX: 29.03 KG/M2 | SYSTOLIC BLOOD PRESSURE: 106 MMHG | WEIGHT: 202.8 LBS | OXYGEN SATURATION: 96 %

## 2022-09-22 DIAGNOSIS — D64.9 ANEMIA, UNSPECIFIED TYPE: Primary | ICD-10-CM

## 2022-09-22 LAB
ABSOLUTE EOS #: 0.14 K/UL (ref 0–0.44)
ABSOLUTE IMMATURE GRANULOCYTE: <0.03 K/UL (ref 0–0.3)
ABSOLUTE LYMPH #: 0.89 K/UL (ref 1.1–3.7)
ABSOLUTE MONO #: 0.6 K/UL (ref 0.1–1.2)
ALBUMIN SERPL-MCNC: 3.8 G/DL (ref 3.5–5.2)
ALBUMIN/GLOBULIN RATIO: 1.6 (ref 1–2.5)
ALP BLD-CCNC: 74 U/L (ref 40–129)
ALT SERPL-CCNC: 6 U/L (ref 5–41)
ANION GAP SERPL CALCULATED.3IONS-SCNC: 9 MMOL/L (ref 9–17)
AST SERPL-CCNC: 10 U/L
BASOPHILS # BLD: 1 % (ref 0–2)
BASOPHILS ABSOLUTE: <0.03 K/UL (ref 0–0.2)
BILIRUB SERPL-MCNC: 0.3 MG/DL (ref 0.3–1.2)
BUN BLDV-MCNC: 40 MG/DL (ref 8–23)
BUN/CREAT BLD: 15 (ref 9–20)
CALCIUM SERPL-MCNC: 8.9 MG/DL (ref 8.6–10.4)
CHLORIDE BLD-SCNC: 101 MMOL/L (ref 98–107)
CHP ED QC CHECK: YES
CO2: 26 MMOL/L (ref 20–31)
CREAT SERPL-MCNC: 2.64 MG/DL (ref 0.7–1.2)
EOSINOPHILS RELATIVE PERCENT: 3 % (ref 1–4)
GFR AFRICAN AMERICAN: 29 ML/MIN
GFR NON-AFRICAN AMERICAN: 24 ML/MIN
GFR SERPL CREATININE-BSD FRML MDRD: ABNORMAL ML/MIN/{1.73_M2}
GLUCOSE BLD-MCNC: 141 MG/DL (ref 70–99)
HCT VFR BLD CALC: 22.1 % (ref 40.7–50.3)
HEMOCCULT STL QL: NEGATIVE
HEMOGLOBIN: 7 G/DL (ref 13–17)
IMMATURE GRANULOCYTES: 0 %
LYMPHOCYTES # BLD: 21 % (ref 24–43)
MCH RBC QN AUTO: 24.3 PG (ref 25.2–33.5)
MCHC RBC AUTO-ENTMCNC: 31.7 G/DL (ref 25.2–33.5)
MCV RBC AUTO: 76.7 FL (ref 82.6–102.9)
MONOCYTES # BLD: 14 % (ref 3–12)
NRBC AUTOMATED: 0 PER 100 WBC
PDW BLD-RTO: 17.2 % (ref 11.8–14.4)
PLATELET # BLD: 196 K/UL (ref 138–453)
PMV BLD AUTO: 9.5 FL (ref 8.1–13.5)
POTASSIUM SERPL-SCNC: 4.2 MMOL/L (ref 3.7–5.3)
RBC # BLD: 2.88 M/UL (ref 4.21–5.77)
RBC # BLD: ABNORMAL 10*6/UL
SEG NEUTROPHILS: 62 % (ref 36–65)
SEGMENTED NEUTROPHILS ABSOLUTE COUNT: 2.68 K/UL (ref 1.5–8.1)
SODIUM BLD-SCNC: 136 MMOL/L (ref 135–144)
TOTAL PROTEIN: 6.2 G/DL (ref 6.4–8.3)
TROPONIN, HIGH SENSITIVITY: 72 NG/L (ref 0–22)
WBC # BLD: 4.3 K/UL (ref 3.5–11.3)

## 2022-09-22 PROCEDURE — 85025 COMPLETE CBC W/AUTO DIFF WBC: CPT

## 2022-09-22 PROCEDURE — 99284 EMERGENCY DEPT VISIT MOD MDM: CPT

## 2022-09-22 PROCEDURE — 84484 ASSAY OF TROPONIN QUANT: CPT

## 2022-09-22 PROCEDURE — 93005 ELECTROCARDIOGRAM TRACING: CPT | Performed by: EMERGENCY MEDICINE

## 2022-09-22 PROCEDURE — 80053 COMPREHEN METABOLIC PANEL: CPT

## 2022-09-22 ASSESSMENT — ENCOUNTER SYMPTOMS
NAUSEA: 0
ABDOMINAL PAIN: 0
TROUBLE SWALLOWING: 0
SORE THROAT: 0
BLOOD IN STOOL: 0
CONSTIPATION: 0
WHEEZING: 0
DIARRHEA: 0
SHORTNESS OF BREATH: 0
BACK PAIN: 0
VOMITING: 0

## 2022-09-22 ASSESSMENT — LIFESTYLE VARIABLES: HOW OFTEN DO YOU HAVE A DRINK CONTAINING ALCOHOL: NEVER

## 2022-09-22 ASSESSMENT — PAIN - FUNCTIONAL ASSESSMENT
PAIN_FUNCTIONAL_ASSESSMENT: NONE - DENIES PAIN
PAIN_FUNCTIONAL_ASSESSMENT: NONE - DENIES PAIN

## 2022-09-22 NOTE — ED PROVIDER NOTES
[facility]  eMERGENCY dEPARTMENT eNCOUnter      Pt Name: Jocelynn Perez  MRN: [id number]  [place] 1945  Date of evaluation: 9/22/2022      CHIEF COMPLAINT       Chief Complaint   Patient presents with    Discuss Labs     Abnormal labs. Was called at 2330 last night, was told via the [facility] that his Hgb was 7.7 and to f/u with PCP. PCP stated he needs to come to the ER and possibly be transferred to [place]. HISTORY OF PRESENT ILLNESS    Jocelynn Perez is a 68 y.o. male who presents complaint of abnormal labs, he was told his hemoglobin was 7.7 and he had the renal failure was told to come to the ER to get transferred to [place] patient is he feels fine he had a history of anemia he said no fevers no chills no cough no shortness of breath his wife says there is a little bit of an odor coming from his feet, he denies any chest pain any shortness of breath no leg swelling he says overall he feels good he surprised his has to come in. REVIEW OF SYSTEMS         Review of Systems   Constitutional:  Negative for chills and fever. HENT:  Negative for congestion, dental problem, sore throat and trouble swallowing. Eyes:  Negative for visual disturbance. Respiratory:  Negative for shortness of breath and wheezing. Cardiovascular:  Negative for chest pain, palpitations and leg swelling. Gastrointestinal:  Negative for abdominal pain, blood in stool, constipation, diarrhea, nausea and vomiting. Genitourinary:  Negative for difficulty urinating, dysuria and testicular pain. Musculoskeletal:  Negative for back pain, joint swelling and neck pain. Skin:  Negative for rash. Neurological:  Negative for dizziness, syncope, weakness and headaches. Hematological:  Negative for adenopathy. Does not bruise/bleed easily. Psychiatric/Behavioral:  Negative for confusion and suicidal ideas.         PAST MEDICAL HISTORY    has a past medical history of Cerebrovascular accident (CVA) due to embolism of cerebral artery ([facility] [address] CKD (chronic kidney disease) stage 4, GFR 15-29 ml/min ([facility]), Coronary artery disease involving coronary bypass graft of native heart without angina pectoris, H/O lower gastrointestinal bleeding, Tobacco abuse, and Type II or unspecified type diabetes mellitus without mention of complication, not stated as uncontrolled. SURGICAL HISTORY      has a past surgical history that includes Cardiac surgery and Upper gastrointestinal endoscopy (N/A, 6/30/2022). CURRENT MEDICATIONS       Previous Medications    AMIODARONE (CORDARONE) 200 MG TABLET    Take 100 mg by mouth daily    APIXABAN (ELIQUIS) 2.5 MG TABS TABLET    Take 2.5 mg by mouth in the morning and 2.5 mg before bedtime. ASPIRIN 81 MG EC TABLET    Take 81 mg by mouth daily    ATORVASTATIN (LIPITOR) 40 MG TABLET    Take 40 mg by mouth nightly     ERGOCALCIFEROL (ERGOCALCIFEROL) 1.25 MG (55370 UT) CAPSULE    TAKE ONE CAPSULE BY MOUTH ONCE EVERY WEEK FOR VITAMIN (D) DEFICIENCY    FAMOTIDINE (PEPCID) 20 MG TABLET    Take 20 mg by mouth daily    FUROSEMIDE (LASIX) 40 MG TABLET    Take 1 tablet by mouth 2 times daily    GLIPIZIDE PO    Take 5 mg by mouth daily     HANDICAP PLACARD MISC    by Does not apply route Good for 5 years    INSULIN LISPRO, 1 UNIT DIAL, 100 UNIT/ML SOPN    Insulin 100 unit/MI subcutaneous per instructions four times daily.  Sliding scale at meals and HS: 140-160=3 [id number]=3 ouvmy295-259=3 [id number]=9 ooldy962-194=9 units    ISOSORBIDE DINITRATE (ISORDIL) 10 MG TABLET    Take 10 mg by mouth 2 times daily at 0800 and 1400    MAGNESIUM OXIDE (MAG-OX) 400 (240 MG) MG TABLET    Take 200 mg by mouth daily    METOPROLOL SUCCINATE (TOPROL XL) 25 MG EXTENDED RELEASE TABLET    Take 0.5 tablets by mouth daily    MULTIPLE VITAMINS-MINERALS (PRESERVISION AREDS 2 PO)    Take 1 tablet by mouth 2 times daily    PANTOPRAZOLE (PROTONIX) 40 MG TABLET    Take 1 tablet by mouth every morning (before breakfast) QUETIAPINE (SEROQUEL) 25 MG TABLET    Take 25 mg by mouth    SACUBITRIL-VALSARTAN (ENTRESTO) 24-26 MG PER TABLET    Take 1 tablet by mouth 2 times daily       ALLERGIES     is allergic to simvastatin. FAMILY HISTORY     He indicated that his mother is . He indicated that his father is . He indicated that both of his sisters are alive. He indicated that his brother is alive. family history is not on file. SOCIAL HISTORY      reports that he quit smoking about 6 months ago. His smoking use included cigarettes. He smoked an average of 1.5 packs per day. He has never used smokeless tobacco. He reports that he does not drink alcohol and does not use drugs. PHYSICAL EXAM     INITIAL VITALS:  height is 5' 10\" (1.778 m) and weight is 202 lb 12.8 oz (92 kg). His tympanic temperature is 97.3 °F (36.3 °C). His blood pressure is 102/75 and his pulse is 56. His respiration is 12 and oxygen saturation is 100%. Physical Exam  Constitutional:       Appearance: Normal appearance. He is well-developed. HENT:      Head: Normocephalic and atraumatic. Eyes:      Pupils: Pupils are equal, round, and reactive to light. Cardiovascular:      Rate and Rhythm: Normal rate and regular rhythm. Pulmonary:      Effort: Pulmonary effort is normal.      Breath sounds: Normal breath sounds. Abdominal:      General: Bowel sounds are normal.      Palpations: Abdomen is soft. Genitourinary:     Rectum: Guaiac result negative. Musculoskeletal:         General: Normal range of motion. Cervical back: Normal range of motion and neck supple. Skin:     General: Skin is warm and dry. Neurological:      General: No focal deficit present. Mental Status: He is alert and oriented to person, place, and time.    Psychiatric:         Behavior: Behavior normal.         DIFFERENTIAL DIAGNOSIS/ MDM:     Asymptomatic EMEA we will recheck labs    DIAGNOSTIC RESULTS     EKG: All EKG's are interpreted by the Emergency Department Physician who either signs or Co-signs this chart in the absence of a cardiologist.    EKG shows a sinus bradycardia left bundle branch block rate 56 bpm ND was 144 ms QRS duration 256 ms QT corrected 463 ms axis is 1 this is his normal left bundle branch block there is no acute changes I compared previous EKGs    RADIOLOGY:   I directly visualized the following  images and reviewed the radiologist interpretations:          ED BEDSIDE ULTRASOUND:       LABS:  Labs Reviewed   CBC WITH AUTO DIFFERENTIAL - Abnormal; Notable for the following components:       Result Value    RBC 2.88 (*)     Hemoglobin 7.0 (*)     Hematocrit 22.1 (*)     MCV 76.7 (*)     MCH 24.3 (*)     RDW 17.2 (*)     Lymphocytes 21 (*)     Monocytes 14 (*)     Absolute Lymph # 0.89 (*)     All other components within normal limits   COMPREHENSIVE METABOLIC PANEL W/ REFLEX TO MG FOR LOW K - Abnormal; Notable for the following components:    Glucose 141 (*)     BUN 40 (*)     Creatinine 2.64 (*)     Total Protein 6.2 (*)     GFR Non- 24 (*)     GFR  29 (*)     All other components within normal limits   TROPONIN - Abnormal; Notable for the following components:    Troponin, High Sensitivity 72 (*)     All other components within normal limits   POCT OCCULT BLOOD STOOL COLON CA SCREEN 1-3           EMERGENCY DEPARTMENT COURSE:   Vitals:    Vitals:    09/22/22 1129 09/22/22 1153   BP: 102/75    Pulse: 56    Resp: 12    Temp: 97.3 °F (36.3 °C)    TempSrc: Tympanic    SpO2: 100%    Weight:  202 lb 12.8 oz (92 kg)   Height: 5' 10\" (1.778 m)      -------------------------  BP: 102/75, Temp: 97.3 °F (36.3 °C), Heart Rate: 56, Resp: 12        Re-evaluation Notes    On reevaluation patient is asymptomatic he would like to go home    CRITICAL CARE:   None        CONSULTS: I discussed the case with his primary care of Dr. Aurora Osullivan who as there is a no evidence of acute blood loss is seems to be a chronic issue he will follow-up in the office this next week with repeat labs      PROCEDURES:  None    FINAL IMPRESSION      1. Anemia, unspecified type          DISPOSITION/PLAN   DISPOSITION   Discharge    Condition on Disposition    Stable    PATIENT REFERRED TO:  Marlys Villanueva MD  [id number] [place]  [phone]    Schedule an appointment as soon as possible for a visit in 3 days  Follow-up early next week contact Dr. Johana Hoffman office for appointment and repeat lab draw return for any problems    DISCHARGE MEDICATIONS:  New Prescriptions    No medications on file       (Please note that portions of this note were completed with a voice recognition program.  Efforts were made to edit the dictations but occasionally words are mis-transcribed.)    Mana Russo MD,, MD, F.A.A.E.M.   Attending Emergency Physician                           Mana Russo MD  09/22/22 5994

## 2022-09-22 NOTE — CARE COORDINATION
Ambulatory Care Coordination Note  9/22/2022    ACC: Cm Ramires, RN    Summary Note: Darnell Walker was referred for [employer]. He has:            Type II Diabetes- Hgb A1c on 7/2022- 5.7, diet, medications and follows with PCP                           Ischemic cardiomyopathy, CHF- Life vest d/c'd, on medications and follows with cardiology                          CKD- medications and follows with nephrology                          Folowing with vascular surgeon for PAD, wound care at [facility]     [facility]  Receiving [facility] care- nursing, PT/OT through [facility] through the [facility] : Continue assessments, education, and support                          CHF and DM Zone Tools                          Medications- she declined review- Trudy Gunter did review and set them up for her                          [facility]. decision maker updated and she has ACP documents at home. She is aware to bring in for EMR                          Reviewed Urgent Care, clinic and My Chart                          Spouse is working with [place] [address] and [facility] through [facility] care available- spouse can call Olaf Burnett at [phone]. She declined at this time                          Dietitian referral placed 8/29/2022                          F/U on wound care at [address] cardiology 8/26/2022 completed- no changes                          Apt PCP 8/5/2022 completed- next 10/5/2022                          Apt nephrology 9/12/2022- completed. F/U 1/9/2023                          Apt Vascular- 9/9/2022- completed. F/U after scan that was done today. Apt at [place] in [place] 9/21/2022- completed                          F/U on BS, wt, BP, P, wound care      9/22/2022- 9:40 am Nyasia called this writer. Darnell Walker was seen by [place] doctor in [place]. She received a call late last night that his Blood count was 7.6. She denied symptoms.  She stated she was advised to f/u today locally. Called [facility] for staff nurse to return call. This writer informed Heather Richardson. Staff nurse returned call. She will reach out to spouse. Lab Results       None            Care Coordination Interventions    Referral from Primary Care Provider: No  Suggested Interventions and Community Resources  Disease Specific Clinic: Completed (Comment: wound care)  Home Health Services: Completed  Medication Assistance Program: Completed (Comment: through [place])  Occupational Therapy: Completed  Physical Therapy: Completed  Zone Management Tools: In Process (Comment: DM, CHF)          Goals Addressed    None         Prior to Admission medications    Medication Sig Start Date End Date Taking? Authorizing Provider   famotidine (PEPCID) 20 MG tablet Take 20 mg by mouth daily 3/17/22   Historical Provider, MD   sacubitril-valsartan (ENTRESTO) 24-26 MG per tablet Take 1 tablet by mouth 2 times daily    Historical Provider, MD   Handicap Placard MISC by Does not apply route Good for 5 years 8/5/22   Deanna London MD   metoprolol succinate (TOPROL XL) 25 MG extended release tablet Take 0.5 tablets by mouth daily  Patient not taking: Reported on 9/12/2022 7/11/22   Aissatou Vital MD   apixaban (ELIQUIS) 2.5 MG TABS tablet Take 2.5 mg by mouth in the morning and 2.5 mg before bedtime.     Historical Provider, MD   pantoprazole (PROTONIX) 40 MG tablet Take 1 tablet by mouth every morning (before breakfast) 7/3/22   Abby Fitzgerald MD   furosemide (LASIX) 40 MG tablet Take 1 tablet by mouth 2 times daily 5/13/22   Aissatou Vital MD   Multiple Vitamins-Minerals (PRESERVISION AREDS 2 PO) Take 1 tablet by mouth 2 times daily    Historical Provider, MD   amiodarone (CORDARONE) 200 MG tablet Take 200 mg by mouth daily 2/24/22   Historical Provider, MD   aspirin 81 MG EC tablet Take 81 mg by mouth daily    Historical Provider, MD   atorvastatin (LIPITOR) 40 MG tablet Take 40 mg by mouth nightly  9/30/21 Historical Provider, MD   ergocalciferol (ERGOCALCIFEROL) 1.25 MG (77800 UT) capsule TAKE ONE CAPSULE BY MOUTH ONCE EVERY WEEK FOR VITAMIN (D) DEFICIENCY 10/12/21   Historical Provider, MD   insulin lispro, 1 Unit Dial, 100 UNIT/ML SOPN Per s/s 2/24/22   Historical Provider, MD   isosorbide dinitrate (ISORDIL) 10 MG tablet Take 10 mg by mouth 3 times daily 4/19/22   Historical Provider, MD   magnesium oxide (MAG-OX) 400 (240 Mg) MG tablet Take 200 mg by mouth daily 3/31/22   Historical Provider, MD   QUEtiapine (SEROQUEL) 25 MG tablet Take 25 mg by mouth 2/24/22   Historical Provider, MD   GLIPIZIDE PO Take 5 mg by mouth daily     Historical Provider, MD       Future Appointments   Date Time Provider Hany Guerra   10/5/2022  9:40 AM David Mon MD [facility]   1/9/2023 10:50 AM Akanksha Byers MD [facility]

## 2022-09-22 NOTE — TELEPHONE ENCOUNTER
Received call from Delta Garcia, that pt's reported Hgb from the [facility] was 7.6 (drawn on 09/21/2022). Writer called pt's wife for more information. Pt's wife stated that pt was not symptomatic, that his condition has been the same. Writer informed pt's wife that Hgb has dropped significantly from August and that Dr. Kely Ramon was not in the office. Advised pt's wife that pt should be seen in the ER due to the drastic change and that Hgb could potentially be lower now than yesterday's blood draw. Pt's wife in agreement with plan and will take pt to the ER for evaluation. Detail Level: Zone Detail Level: Simple

## 2022-09-23 ENCOUNTER — CARE COORDINATION (OUTPATIENT)
Dept: CARE COORDINATION | Age: 77
End: 2022-09-23

## 2022-09-23 LAB
EKG ATRIAL RATE: 56 BPM
EKG P AXIS: -23 DEGREES
EKG P-R INTERVAL: 144 MS
EKG Q-T INTERVAL: 480 MS
EKG QRS DURATION: 156 MS
EKG QTC CALCULATION (BAZETT): 463 MS
EKG R AXIS: 1 DEGREES
EKG T AXIS: 86 DEGREES
EKG VENTRICULAR RATE: 56 BPM

## 2022-09-23 NOTE — CARE COORDINATION
Ambulatory Care Coordination Note  9/23/2022    ACC: Enedelia Mari, THEODORE    Summary Note:  Dutch was referred for [employer]. He has:            Type II Diabetes- Hgb A1c on 7/2022- 5.7, diet, medications and follows with PCP                           Ischemic cardiomyopathy, CHF- Life vest d/c'd, on medications and follows with cardiology                          CKD- medications and follows with nephrology                          Folowing with vascular surgeon for PAD, wound care at [facility]     [facility]- nursing, PT/OT through [facility] through the [facility] : Continue assessments, education, and support                          CHF and DM Zone Tools                          Medications- she declined review- Trudy Gunter did review and set them up for her                          [facility]. decision maker updated and she has ACP documents at home. She is aware to bring in for EMR                          Reviewed Urgent Care, clinic and My Chart                          [facility] through [facility] care available- spouse can call [facility] at [phone]. She declined at this time                          Dietitian referral placed 8/29/2022                          F/U on wound care at [address] cardiology 8/26/2022 completed- no changes                          Apt PCP 8/5/2022 completed- next 10/5/2022                          Apt nephrology 9/12/2022- completed. F/U 1/9/2023                          Apt Vascular- 9/9/2022- completed. F/U after scan that was done today. Apt at [facility] in [place] 9/21/2022- completed                          F/U on BS, wt, BP, P, wound care      9/23/2022- 10:43 am Nyasia took Dutch to ED yesterday as advised by [facility] office for low Hgb. She stated he is doing well. They will see PCP 9/26/2022 as scheduled.      Lab Results       None Care Coordination Interventions    Referral from Primary Care Provider: No  Suggested Interventions and Community Resources  Disease Specific Clinic: Completed (Comment: wound care)  Home Health Services: Completed  Medication Assistance Program: Completed (Comment: through [place])  Occupational Therapy: Completed  Physical Therapy: Completed  Zone Management Tools: In Process (Comment: DM, CHF)          Goals Addressed    None         Prior to Admission medications    Medication Sig Start Date End Date Taking? Authorizing Provider   famotidine (PEPCID) 20 MG tablet Take 20 mg by mouth daily 3/17/22   Historical Provider, MD   sacubitril-valsartan (ENTRESTO) 24-26 MG per tablet Take 1 tablet by mouth 2 times daily    Historical Provider, MD   Handicap Placard MISC by Does not apply route Good for 5 years 8/5/22   Melisa Yi MD   metoprolol succinate (TOPROL XL) 25 MG extended release tablet Take 0.5 tablets by mouth daily  Patient not taking: No sig reported 7/11/22   Shari Bhat MD   apixaban (ELIQUIS) 2.5 MG TABS tablet Take 2.5 mg by mouth in the morning and 2.5 mg before bedtime.     Historical Provider, MD   pantoprazole (PROTONIX) 40 MG tablet Take 1 tablet by mouth every morning (before breakfast) 7/3/22   Alma Pena MD   furosemide (LASIX) 40 MG tablet Take 1 tablet by mouth 2 times daily 5/13/22   Shari Bhat MD   Multiple Vitamins-Minerals (PRESERVISION AREDS 2 PO) Take 1 tablet by mouth 2 times daily    Historical Provider, MD   amiodarone (CORDARONE) 200 MG tablet Take 100 mg by mouth daily 2/24/22   Historical Provider, MD   aspirin 81 MG EC tablet Take 81 mg by mouth daily    Historical Provider, MD   atorvastatin (LIPITOR) 40 MG tablet Take 40 mg by mouth nightly  9/30/21   Historical Provider, MD   ergocalciferol (ERGOCALCIFEROL) 1.25 MG (07300 UT) capsule TAKE ONE CAPSULE BY MOUTH ONCE EVERY WEEK FOR VITAMIN (D) DEFICIENCY 10/12/21   Historical Provider, MD   insulin lispro, 1 Unit Dial, 100 UNIT/ML SOPN Insulin 100 unit/MI subcutaneous per instructions four times daily.  Sliding scale at meals and HS: 140-160=3 btfrl828-340=8 ibzue876-039=7 zohsx864-116=0 cfflo417-076=5 units 2/24/22   Historical Provider, MD   isosorbide dinitrate (ISORDIL) 10 MG tablet Take 10 mg by mouth 2 times daily at 0800 and 1400 4/19/22   Historical Provider, MD   magnesium oxide (MAG-OX) 400 (240 Mg) MG tablet Take 200 mg by mouth daily 3/31/22   Historical Provider, MD   QUEtiapine (SEROQUEL) 25 MG tablet Take 25 mg by mouth  Patient not taking: Reported on 9/22/2022 2/24/22   Historical Provider, MD   GLIPIZIDE PO Take 5 mg by mouth daily     Historical Provider, MD       Future Appointments   Date Time Provider Hany Mari   9/26/2022 10:30 AM Harley Burton MD [facility]   10/5/2022  9:40 AM [facility]   1/9/2023 10:50 AM [facility] Ralph [facility]

## 2022-09-26 ENCOUNTER — OFFICE VISIT (OUTPATIENT)
Dept: FAMILY MEDICINE CLINIC | Age: 77
End: 2022-09-26
Payer: MEDICARE

## 2022-09-26 VITALS
BODY MASS INDEX: 28.49 KG/M2 | WEIGHT: 199 LBS | HEIGHT: 70 IN | HEART RATE: 67 BPM | OXYGEN SATURATION: 99 % | SYSTOLIC BLOOD PRESSURE: 100 MMHG | DIASTOLIC BLOOD PRESSURE: 48 MMHG

## 2022-09-26 DIAGNOSIS — I25.5 ISCHEMIC CARDIOMYOPATHY: ICD-10-CM

## 2022-09-26 DIAGNOSIS — N18.30 STAGE 3 CHRONIC KIDNEY DISEASE, UNSPECIFIED WHETHER STAGE 3A OR 3B CKD (HCC): ICD-10-CM

## 2022-09-26 DIAGNOSIS — D50.0 IRON DEFICIENCY ANEMIA DUE TO CHRONIC BLOOD LOSS: ICD-10-CM

## 2022-09-26 PROCEDURE — 1124F ACP DISCUSS-NO DSCNMKR DOCD: CPT | Performed by: FAMILY MEDICINE

## 2022-09-26 PROCEDURE — 99214 OFFICE O/P EST MOD 30 MIN: CPT | Performed by: FAMILY MEDICINE

## 2022-09-26 PROCEDURE — 99212 OFFICE O/P EST SF 10 MIN: CPT

## 2022-09-26 RX ORDER — FERROUS SULFATE 325(65) MG
325 TABLET ORAL 2 TIMES DAILY
Qty: 60 TABLET | Refills: 5 | Status: SHIPPED | OUTPATIENT
Start: 2022-09-26

## 2022-09-26 NOTE — PROGRESS NOTES
HPI:  Patient comes in today for   Chief Complaint   Patient presents with    Anemia     ER follow up for hemoglobin of 7.6. Patient here to f/u was seen in the ER 9/22/22 after he was sen at the [facility] and was found to have low hgb at 7.0 and  CKD sees nephrology patient did not require transfusion was asymptomatic stool hemoccult  was negtive. Was seen at the [facility] ER again on 9/24/22 per cardiology recommendation to recheck hgb and was improved at 7. 6. Has had multiple hospitalizations ,recently at [facility] with low BP and his medications were titrated ,in the past has  needed transfusion CAD s/p CABG in 2/2022,cardiomyopathy has life vest in place  ,s/p CVA in 8/2021 had thrombolytics no residual problems,HTN,DM Type 2,CKD. No chest pain or SOB. Has a sore in right great toe for the last 3 months is seeing  podiatry also has seen vascular. Has home health currently with nursing and PT/OT    HISTORY:  Past Medical History:   Diagnosis Date    Cerebrovascular accident (CVA) due to embolism of cerebral artery (HCC)     CKD (chronic kidney disease) stage 4, GFR 15-29 ml/min (HCC)     Coronary artery disease involving coronary bypass graft of native heart without angina pectoris     H/O lower gastrointestinal bleeding     Tobacco abuse     Type II or unspecified type diabetes mellitus without mention of complication, not stated as uncontrolled        Past Surgical History:   Procedure Laterality Date    CARDIAC SURGERY      UPPER GASTROINTESTINAL ENDOSCOPY N/A 6/30/2022    ** BEDSIDE** EGD ESOPHAGOGASTRODUODENOSCOPY performed by Dai Allison MD at [facility]        No family history on file.     Social History     Socioeconomic History    Marital status:      Spouse name: Not on file    Number of children: Not on file    Years of education: Not on file    Highest education level: Not on file   Occupational History    Not on file   Tobacco Use    Smoking status: Former     Packs/day: 1.50     Types: Cigarettes     Quit date: 2022     Years since quittin.5    Smokeless tobacco: Never   Substance and Sexual Activity    Alcohol use: No    Drug use: No    Sexual activity: Not on file   Other Topics Concern    Not on file   Social History Narrative    Not on file     Social Determinants of Health     Financial Resource Strain: Low Risk     Difficulty of Paying Living Expenses: Not very hard   Food Insecurity: No Food Insecurity    Worried About Running Out of Food in the Last Year: Never true    Ran Out of Food in the Last Year: Never true   Transportation Needs: No Transportation Needs    Lack of Transportation (Medical): No    Lack of Transportation (Non-Medical): No   Physical Activity: Inactive    Days of Exercise per Week: 0 days    Minutes of Exercise per Session: 0 min   Stress: No Stress Concern Present    Feeling of Stress : Not at all   Social Connections: Moderately Isolated    Frequency of Communication with Friends and Family: More than three times a week    Frequency of Social Gatherings with Friends and Family: More than three times a week    Attends Congregation Services: Never    Active Member of Clubs or Organizations: No    Attends Club or Organization Meetings: Never    Marital Status:    Intimate Partner Violence: Not on file   Housing Stability: [address] to Pay for Housing in the Last Year: No    Number of [place] in the Last Year: 1    Unstable Housing in the Last Year: No       Current Outpatient Medications   Medication Sig Dispense Refill    famotidine (PEPCID) 20 MG tablet Take 20 mg by mouth daily      sacubitril-valsartan (ENTRESTO) 24-26 MG per tablet Take 1 tablet by mouth 2 times daily      Handicap Placard MISC by Does not apply route Good for 5 years 1 each 0    apixaban (ELIQUIS) 2.5 MG TABS tablet Take 2.5 mg by mouth in the morning and 2.5 mg before bedtime.       pantoprazole (PROTONIX) 40 MG tablet Take 1 tablet by mouth every morning (before breakfast) 30 tablet 3    furosemide (LASIX) 40 MG tablet Take 1 tablet by mouth 2 times daily 180 tablet 3    Multiple Vitamins-Minerals (PRESERVISION AREDS 2 PO) Take 1 tablet by mouth 2 times daily      amiodarone (CORDARONE) 200 MG tablet Take 100 mg by mouth daily      aspirin 81 MG EC tablet Take 81 mg by mouth daily      atorvastatin (LIPITOR) 40 MG tablet Take 40 mg by mouth nightly       ergocalciferol (ERGOCALCIFEROL) 1.25 MG (18293 UT) capsule TAKE ONE CAPSULE BY MOUTH ONCE EVERY WEEK FOR VITAMIN (D) DEFICIENCY      insulin lispro, 1 Unit Dial, 100 UNIT/ML SOPN Insulin 100 unit/MI subcutaneous per instructions four times daily. Sliding scale at meals and HS: 140-160=3 [id number]=3 mpufn979-299=2 viktw455-013=1 dtoht616-670=3 units      isosorbide dinitrate (ISORDIL) 10 MG tablet Take 10 mg by mouth 2 times daily at 0800 and 1400      magnesium oxide (MAG-OX) 400 (240 Mg) MG tablet Take 200 mg by mouth daily      GLIPIZIDE PO Take 5 mg by mouth daily       metoprolol succinate (TOPROL XL) 25 MG extended release tablet Take 0.5 tablets by mouth daily (Patient not taking: No sig reported) 45 tablet 3    QUEtiapine (SEROQUEL) 25 MG tablet Take 25 mg by mouth (Patient not taking: No sig reported)       No current facility-administered medications for this visit. Allergies   Allergen Reactions    Simvastatin Headaches       REVIEW OF SYSTEMS:  General: No fevers, chills, change in weight  HEENT: No double vision, blurry vision, runny nose, sore throat, tinnitus  Cardio: No chest pain, palpitations, WILLIS, edema, PND  Pulmonary: No cough, hemoptysis, SOB  GI: No nausea, vomiting, dysphagia, odynophagia, diarrhea, has chronic constipation. : No dysuria, hematuria, urgency, incontinence  Musculoskeletal: No muscle or joint aches, no joint swelling  Neuro: No dizziness/lightheadedness, no seizures  Endocrine: No polyuria, polydipsia, polyphagia, no temperature intolerance  Skin: Has toe ulcers.   Sleep: good  Psychiatric: No depression or anxiety    PHYSICAL EXAM:  VS:  BP (!) 100/48   Pulse 67   Ht 5' 10\" (1.778 m)   Wt 199 lb (90.3 kg)   SpO2 99%   BMI 28.55 kg/m²   General:  Alert and oriented, NAD  HEENT:  TMs, STEFAN, EOMI, Conjunctivae clear       Throat currently clear. NECK:  Supple without adenopathy or thyromegaly, no carotid bruits  LUNGS:  CTA all fields  HEART:  Irregular  ABDOMEN:  Soft and nontender without palpable abnormalities  EXTREMITIES:Both feet in dressing and boot,has mild swelling in  ankles. , no calf tenderness  NEURO:  No focal deficits. SKIN: Dry skin has some erythematous areas in both legs. ASSESSMENT/PLAN:     Diagnosis Orders   1. Iron deficiency anemia due to chronic blood loss  CBC    Iron and TIBC      2. Ischemic cardiomyopathy        3. Stage 3 chronic kidney disease, unspecified whether stage 3a or 3b CKD ([facility] [address]              Orders Placed This Encounter   Procedures    CBC     Standing Status:   Future     Standing Expiration Date:   9/26/2023       Requested Prescriptions      No prescriptions requested or ordered in this encounter     Patient to f/u with nephrology and see if he is candidate for iron infusion. Wll resume oral iron supplements. Check BMP in 1 week with iron studies  Medications reviewed,continue with current meds. Miralax prn constipation. Continue with home health. F/U with cardiology  Return in about 2 months (around 11/26/2022).     Electronically signed by Minnie Sanon MD

## 2022-09-30 ENCOUNTER — TELEPHONE (OUTPATIENT)
Dept: FAMILY MEDICINE CLINIC | Age: 77
End: 2022-09-30

## 2022-09-30 ENCOUNTER — HOSPITAL ENCOUNTER (OUTPATIENT)
Age: 77
Setting detail: SPECIMEN
Discharge: HOME | End: 2022-09-30
Payer: MEDICARE

## 2022-09-30 DIAGNOSIS — D50.0 IRON DEFICIENCY ANEMIA DUE TO CHRONIC BLOOD LOSS: Primary | ICD-10-CM

## 2022-09-30 DIAGNOSIS — D50.0 IRON DEFICIENCY ANEMIA DUE TO CHRONIC BLOOD LOSS: ICD-10-CM

## 2022-09-30 DIAGNOSIS — I50.22 CHRONIC SYSTOLIC (CONGESTIVE) HEART FAILURE (HCC): Primary | ICD-10-CM

## 2022-09-30 LAB
ABSOLUTE EOS #: 0.2 K/UL (ref 0–0.44)
ABSOLUTE IMMATURE GRANULOCYTE: <0.03 K/UL (ref 0–0.3)
ABSOLUTE LYMPH #: 1.2 K/UL (ref 1.1–3.7)
ABSOLUTE MONO #: 0.8 K/UL (ref 0.1–1.2)
BASOPHILS # BLD: 1 % (ref 0–2)
BASOPHILS ABSOLUTE: 0.03 K/UL (ref 0–0.2)
EOSINOPHILS RELATIVE PERCENT: 4 % (ref 1–4)
HCT VFR BLD CALC: 23.2 % (ref 40.7–50.3)
HEMOGLOBIN: 7.1 G/DL (ref 13–17)
IMMATURE GRANULOCYTES: 0 %
IRON SATURATION: 6 % (ref 20–55)
IRON: 19 UG/DL (ref 59–158)
LYMPHOCYTES # BLD: 23 % (ref 24–43)
MCH RBC QN AUTO: 23.7 PG (ref 25.2–33.5)
MCHC RBC AUTO-ENTMCNC: 30.6 G/DL (ref 25.2–33.5)
MCV RBC AUTO: 77.3 FL (ref 82.6–102.9)
MONOCYTES # BLD: 15 % (ref 3–12)
NRBC AUTOMATED: 0 PER 100 WBC
PDW BLD-RTO: 16.9 % (ref 11.8–14.4)
PLATELET # BLD: 174 K/UL (ref 138–453)
PMV BLD AUTO: 10.3 FL (ref 8.1–13.5)
RBC # BLD: 3 M/UL (ref 4.21–5.77)
RBC # BLD: ABNORMAL 10*6/UL
SEG NEUTROPHILS: 57 % (ref 36–65)
SEGMENTED NEUTROPHILS ABSOLUTE COUNT: 2.95 K/UL (ref 1.5–8.1)
TOTAL IRON BINDING CAPACITY: 323 UG/DL (ref 250–450)
UNSATURATED IRON BINDING CAPACITY: 304 UG/DL (ref 112–347)
WBC # BLD: 5.2 K/UL (ref 3.5–11.3)

## 2022-09-30 PROCEDURE — 83540 ASSAY OF IRON: CPT

## 2022-09-30 PROCEDURE — 83550 IRON BINDING TEST: CPT

## 2022-09-30 PROCEDURE — 85025 COMPLETE CBC W/AUTO DIFF WBC: CPT

## 2022-09-30 NOTE — TELEPHONE ENCOUNTER
Abrahan Level with [facility] calling stating they cancelled pt's foot surgery due to his heart, so they are requesting an order for hospice eval, please send order to fax [phone], any questions call vincent at [phone]

## 2022-10-03 NOTE — TELEPHONE ENCOUNTER
OK to place hospice referral per Dr. Ella Moses. Done at this time. Spoke with Kenna Horta, states they can draw the CBC on Friday and decide if pt will pursue iron transfusion if warranted. Dr. Ella Moses agrees. Will place order Friday if needed.

## 2022-10-03 NOTE — TELEPHONE ENCOUNTER
Per result note, recheck hgb in 1 week and f/u with nephrology to see if pt is a candidate for iron infusion. Spoke with Dr. Flori England nurse who states per Dr. Zulema Villeda he is OK to get iron infusion. Dr. Shaneka Tabares notified.

## 2022-10-07 ENCOUNTER — CARE COORDINATION (OUTPATIENT)
Dept: CARE COORDINATION | Age: 77
End: 2022-10-07

## 2022-10-07 NOTE — CARE COORDINATION
Ambulatory Care Coordination Note  10/7/2022    ACC: Issac Crawford, RN    Nasir Carreon was referred for [employer]. He has:            Type II Diabetes- Hgb A1c on 7/2022- 5.7, diet, medications and follows with PCP                           Ischemic cardiomyopathy, CHF- Life vest d/c'd, on medications and follows with cardiology                          CKD- medications and follows with nephrology                          Folowing with vascular surgeon for PAD, wound care at [facility]     [facility]- [facility], PT[facility]OT through [facility] through the [facility] : Continue assessments, education, and support                          CHF and DM Zone Tools                          Medications- she declined review- Trudy Gunter did review and set them up for her    RPM- N/A                          Aura decision maker updated and she has ACP documents at home. She is aware to bring in for EMR                          Reviewed Urgent Care, clinic and My Chart    Care Gaps- will receive flu vaccine and Covid booster                          Spouse is working with cristian  and [facility] through [facility] care available- spouse can call Clarence Bonilla at [phone]. She declined at this time                          Dietitian referral placed 8/29/2022                          Apt PCP 11/28/2022                          Apt nephrology 9/12/2022- completed. F/U 1/9/2023                          Apt Vascular- 9/9/2022 at [address]- now referred to [facility] cardiology at [facility] 10/11/2022    Apt at vascular 10/18/2022 at [address] at [place] [facility] [place] 9/21/2022- completed                          F/U on BS, wt, BP, P, wound care at [facility]    F/U on hospice care- they did consult    Nasir Carreon was seen at [address] ED for anemia d/t CKD on 9/24/2022. He was seen by PCP afterwards. 10/7/2022- 9:17 am unable to reach by phone- N/A.     Spoke with spouse Nyasia. She stated [facility] nurse is drawing labs. She stated he does not seem tired. He continues to follow with wound care at [facility]. They have been talking Hospice. He is tired of going to apts all the time. She did hospice consult through [facility]. She is still not thinking about it. Wt 194- stable, /53, P- 58, BS 91. Denied hypoglycemia. [facility] nurse following and assisting with wound care. He is scheduled with the [place]. Cardiology- 10/11/2022 at [facility]. General Assessment    Do you have any symptoms that are causing concern?: Yes  Progression since Onset: Unchanged  Reported Symptoms: Other (Comment: wounds- ly legs)          Diabetes Assessment    Medic Alert ID: No  Meal Planning: Carb counting   How often do you test your blood sugar?: Meals (Comment: BID)   Do you have barriers with adherence to non-pharmacologic self-management interventions? (Nutrition/Exercise/Self-Monitoring): No   Have you ever had to go to the ED for symptoms of low blood sugar?: No       No patient-reported symptoms         and   Congestive Heart Failure Assessment    Do you understand a low sodium diet?: Yes  Do you understand how to read food labels?: Yes  How many restaurant meals do you eat per week?: 0  Do you salt your food before tasting it?: No     No patient-reported symptoms      Symptoms:     Symptom course: stable  Patient-reported weight (lb): 194  Weight trend: stable  Salt intake watch compared to last visit: stable        Offered patient enrollment in the Remote Patient Monitoring (RPM) program for in-home monitoring: NA.     Lab Results       None            Care Coordination Interventions    Referral from Primary Care Provider: No  Suggested Interventions and Community Resources  Disease Specific Clinic: Completed (Comment: wound care)  Home Health Services: Completed  Medication Assistance Program: Completed (Comment: through [place])  Occupational Therapy: Completed  Physical Therapy: Completed  Zone Management Tools: In Process (Comment: DM, CHF)          Goals Addressed                   This Visit's Progress     Conditions and Symptoms   On track     I will schedule office visits, as directed by my provider. I will keep my appointment or reschedule if I have to cancel. I will notify my provider of any barriers to my plan of care. I will follow my Zone Management tool to seek urgent or emergent care. I will notify my provider of any symptoms that indicate a worsening of my condition. Barriers: lack of support and lack of education  Plan for overcoming my barriers: Care Coordination Intervention, [facility] care, and assistance from [address] and [facility]  Confidence: 10/10  Anticipated Goal Completion Date: 10/28/2022                Prior to Admission medications    Medication Sig Start Date End Date Taking? Authorizing Provider   ferrous sulfate (IRON 325) 325 (65 Fe) MG tablet Take 1 tablet by mouth 2 times daily 9/26/22   Nyasia Ann MD   famotidine (PEPCID) 20 MG tablet Take 20 mg by mouth daily 3/17/22   Historical Provider, MD   sacubitril-valsartan (ENTRESTO) 24-26 MG per tablet Take 1 tablet by mouth 2 times daily    Historical Provider, MD   Handicap Placard MISC by Does not apply route Good for 5 years 8/5/22   Nyasia Ann MD   metoprolol succinate (TOPROL XL) 25 MG extended release tablet Take 0.5 tablets by mouth daily  Patient not taking: No sig reported 7/11/22   Rosario Castellanos MD   apixaban (ELIQUIS) 2.5 MG TABS tablet Take 2.5 mg by mouth in the morning and 2.5 mg before bedtime.     Historical Provider, MD   pantoprazole (PROTONIX) 40 MG tablet Take 1 tablet by mouth every morning (before breakfast) 7/3/22   Jesus Díaz MD   furosemide (LASIX) 40 MG tablet Take 1 tablet by mouth 2 times daily 5/13/22   Rosario Castellanos MD   Multiple Vitamins-Minerals (PRESERVISION AREDS 2 PO) Take 1 tablet by mouth 2 times daily    Historical Provider, MD   amiodarone (CORDARONE) 200 MG tablet Take 100 mg by mouth daily 2/24/22   Historical Provider, MD   aspirin 81 MG EC tablet Take 81 mg by mouth daily    Historical Provider, MD   atorvastatin (LIPITOR) 40 MG tablet Take 40 mg by mouth nightly  9/30/21   Historical Provider, MD   ergocalciferol (ERGOCALCIFEROL) 1.25 MG (77655 UT) capsule TAKE ONE CAPSULE BY MOUTH ONCE EVERY WEEK FOR VITAMIN (D) DEFICIENCY 10/12/21   Historical Provider, MD   insulin lispro, 1 Unit Dial, 100 UNIT/ML SOPN Insulin 100 unit/MI subcutaneous per instructions four times daily.  Sliding scale at meals and HS: 140-160=3 [id number]=0 pzfgg182-912=3 pabxt470-893=7 lxmup271-339=9 units 2/24/22   Historical Provider, MD   isosorbide dinitrate (ISORDIL) 10 MG tablet Take 10 mg by mouth 2 times daily at 0800 and 1400 4/19/22   Historical Provider, MD   magnesium oxide (MAG-OX) 400 (240 Mg) MG tablet Take 200 mg by mouth daily 3/31/22   Historical Provider, MD   QUEtiapine (SEROQUEL) 25 MG tablet Take 25 mg by mouth  Patient not taking: No sig reported 2/24/22   Historical Provider, MD   GLIPIZIDE PO Take 5 mg by mouth daily     Historical Provider, MD       Future Appointments   Date Time Provider Hany Guerra   11/28/2022 10:30 AM Manuel Damian MD [facility]   1/9/2023 10:50 AM Niranjan Gonzalez MD [facility]

## 2022-10-10 ENCOUNTER — TELEPHONE (OUTPATIENT)
Dept: FAMILY MEDICINE CLINIC | Age: 77
End: 2022-10-10

## 2022-10-10 DIAGNOSIS — I50.22 CHRONIC SYSTOLIC (CONGESTIVE) HEART FAILURE (HCC): ICD-10-CM

## 2022-10-10 DIAGNOSIS — D50.0 IRON DEFICIENCY ANEMIA DUE TO CHRONIC BLOOD LOSS: Primary | ICD-10-CM

## 2022-10-10 NOTE — TELEPHONE ENCOUNTER
Domenico Valdivia with [facility] calling stating pt was admitted to Hospice last week, pt he is a [facility] pt and they allow aggressive treatment, so family would like pt's H&H monitored, will you order and monitor, please advise at [phone]

## 2022-10-13 ENCOUNTER — HOSPITAL ENCOUNTER (OUTPATIENT)
Age: 77
Setting detail: SPECIMEN
Discharge: HOME | End: 2022-10-13
Payer: OTHER GOVERNMENT

## 2022-10-13 ENCOUNTER — TELEPHONE (OUTPATIENT)
Dept: FAMILY MEDICINE CLINIC | Age: 77
End: 2022-10-13

## 2022-10-13 DIAGNOSIS — D50.0 IRON DEFICIENCY ANEMIA DUE TO CHRONIC BLOOD LOSS: ICD-10-CM

## 2022-10-13 DIAGNOSIS — I50.22 CHRONIC SYSTOLIC (CONGESTIVE) HEART FAILURE (HCC): ICD-10-CM

## 2022-10-13 LAB
HCT VFR BLD CALC: 22.6 % (ref 40.7–50.3)
HEMOGLOBIN: 7 G/DL (ref 13–17)

## 2022-10-13 PROCEDURE — 85018 HEMOGLOBIN: CPT

## 2022-10-13 PROCEDURE — 85014 HEMATOCRIT: CPT

## 2022-10-13 NOTE — TELEPHONE ENCOUNTER
Received call from lab stating pt's hgb was critical at 7.0. Writer called and informed Dr. Simin Jimenes. Per Dr. Simin Jimenes nothing to be done at this time.

## 2022-10-17 ENCOUNTER — TELEPHONE (OUTPATIENT)
Dept: FAMILY MEDICINE CLINIC | Age: 77
End: 2022-10-17

## 2022-10-17 ENCOUNTER — CARE COORDINATION (OUTPATIENT)
Dept: CARE COORDINATION | Age: 77
End: 2022-10-17

## 2022-10-17 NOTE — CARE COORDINATION
Ambulatory Care Coordination Note  10/17/2022    ACC: Emmanuel Enciso, RN    [facility]. He has:            Type II Diabetes- Hgb A1c on 7/2022- 5.7, diet, medications and follows with PCP                           Ischemic cardiomyopathy, CHF- Life vest d/c'd, on medications and follows with cardiology                          CKD- medications and follows with nephrology                          Folowing with vascular surgeon for PAD, wound care at [facility]     [facility]- nursing, PT/OT through [facility] through the [place]. He enrolled in [facility] care. Plan of Care : He is enrolled in hospice care. ACM episode resolved. 10/17/2022- 2:08 pm Left message requesting return call @ [phone].    10/18/2022- 10:04 am spoke with Nyasia. He is following with hospice. She has [facility] care contact information. She stated he is without symptoms. They are watching his blood count. She is aware that this writer will no longer be calling and in agreement. Lab Results       None            Care Coordination Interventions    Referral from Primary Care Provider: No  Suggested Interventions and Community Resources  Disease Specific Clinic: Completed (Comment: wound care)  Home Health Services: Completed  Hospice: Completed (Comment: Consult completed- [facility])  Medication Assistance Program: Completed (Comment: through [place])  Occupational Therapy: Completed  Physical Therapy: Completed  Zone Management Tools: In Process (Comment: DM, CHF)          Goals Addressed    None         Prior to Admission medications    Medication Sig Start Date End Date Taking?  Authorizing Provider   ferrous sulfate (IRON 325) 325 (65 Fe) MG tablet Take 1 tablet by mouth 2 times daily 9/26/22   Juli Rubio MD   famotidine (PEPCID) 20 MG tablet Take 20 mg by mouth daily 3/17/22   Historical Provider, MD   sacubitril-valsartan (ENTRESTO) 24-26 MG per tablet Take 1 tablet by mouth 2 times daily    Historical Provider, MD   Handicap Placard MISC by Does not apply route Good for 5 years 8/5/22   Ayaz Wiess MD   metoprolol succinate (TOPROL XL) 25 MG extended release tablet Take 0.5 tablets by mouth daily  Patient not taking: No sig reported 7/11/22   Charleen Arguelles MD   apixaban (ELIQUIS) 2.5 MG TABS tablet Take 2.5 mg by mouth in the morning and 2.5 mg before bedtime. Historical Provider, MD   pantoprazole (PROTONIX) 40 MG tablet Take 1 tablet by mouth every morning (before breakfast) 7/3/22   Chelsea Gonzalez MD   furosemide (LASIX) 40 MG tablet Take 1 tablet by mouth 2 times daily 5/13/22   Charleen Arguelles MD   Multiple Vitamins-Minerals (PRESERVISION AREDS 2 PO) Take 1 tablet by mouth 2 times daily    Historical Provider, MD   amiodarone (CORDARONE) 200 MG tablet Take 100 mg by mouth daily 2/24/22   Historical Provider, MD   aspirin 81 MG EC tablet Take 81 mg by mouth daily    Historical Provider, MD   atorvastatin (LIPITOR) 40 MG tablet Take 40 mg by mouth nightly  9/30/21   Historical Provider, MD   ergocalciferol (ERGOCALCIFEROL) 1.25 MG (14351 UT) capsule TAKE ONE CAPSULE BY MOUTH ONCE EVERY WEEK FOR VITAMIN (D) DEFICIENCY 10/12/21   Historical Provider, MD   insulin lispro, 1 Unit Dial, 100 UNIT/ML SOPN Insulin 100 unit/MI subcutaneous per instructions four times daily.  Sliding scale at meals and HS: 140-160=3 [id number]=9 [id number]=4 [id number]=5 [id number]=6 units 2/24/22   Historical Provider, MD   isosorbide dinitrate (ISORDIL) 10 MG tablet Take 10 mg by mouth 2 times daily at 0800 and 1400 4/19/22   Historical Provider, MD   magnesium oxide (MAG-OX) 400 (240 Mg) MG tablet Take 200 mg by mouth daily 3/31/22   Historical Provider, MD   QUEtiapine (SEROQUEL) 25 MG tablet Take 25 mg by mouth  Patient not taking: No sig reported 2/24/22   Historical Provider, MD   GLIPIZIDE PO Take 5 mg by mouth daily     Historical Provider, MD       Future Appointments Date Time Provider [facility]   11/28/2022 10:30 AM [facility]   1/9/2023 10:50 AM [facility]

## 2022-10-17 NOTE — TELEPHONE ENCOUNTER
Alfonso Donaldson with [facility] calling on status of H&H that was dropped off last Thursday, please advise, Gerardo Cuenca will be in a meeting from [place] [id number] [id number],  may leave message or call after meeting.

## 2022-10-20 ENCOUNTER — HOSPITAL ENCOUNTER (OUTPATIENT)
Age: 77
Setting detail: SPECIMEN
Discharge: HOME | End: 2022-10-20
Payer: MEDICARE

## 2022-10-20 LAB
HCT VFR BLD CALC: 23.2 % (ref 40.7–50.3)
HEMOGLOBIN: 7.1 G/DL (ref 13–17)

## 2022-10-20 PROCEDURE — 85018 HEMOGLOBIN: CPT

## 2022-10-20 PROCEDURE — 85014 HEMATOCRIT: CPT

## 2022-10-21 ENCOUNTER — TELEPHONE (OUTPATIENT)
Dept: FAMILY MEDICINE CLINIC | Age: 77
End: 2022-10-21

## 2022-10-21 NOTE — TELEPHONE ENCOUNTER
----- Message from Dmitri Chester MD sent at 10/20/2022  3:41 PM EDT -----  Hgb remains low but stable can recheck in 2 weeks

## 2022-11-03 ENCOUNTER — HOSPITAL ENCOUNTER (OUTPATIENT)
Age: 77
Setting detail: SPECIMEN
Discharge: HOME | End: 2022-11-03
Payer: MEDICARE

## 2022-11-03 LAB
HCT VFR BLD CALC: 22.9 % (ref 40.7–50.3)
HEMOGLOBIN: 7.2 G/DL (ref 13–17)

## 2022-11-03 PROCEDURE — 85018 HEMOGLOBIN: CPT

## 2022-11-03 PROCEDURE — 85014 HEMATOCRIT: CPT

## 2022-11-28 ENCOUNTER — TELEPHONE (OUTPATIENT)
Dept: FAMILY MEDICINE CLINIC | Age: 77
End: 2022-11-28

## 2022-11-28 ENCOUNTER — HOSPITAL ENCOUNTER (OUTPATIENT)
Age: 77
Setting detail: SPECIMEN
Discharge: HOME | End: 2022-11-28
Payer: MEDICARE

## 2022-11-28 DIAGNOSIS — D50.0 IRON DEFICIENCY ANEMIA DUE TO CHRONIC BLOOD LOSS: ICD-10-CM

## 2022-11-28 DIAGNOSIS — I50.22 CHRONIC SYSTOLIC (CONGESTIVE) HEART FAILURE (HCC): ICD-10-CM

## 2022-11-28 LAB
HCT VFR BLD CALC: 22 % (ref 40.7–50.3)
HEMOGLOBIN: 6.8 G/DL (ref 13–17)

## 2022-11-28 PROCEDURE — 85018 HEMOGLOBIN: CPT

## 2022-11-28 PROCEDURE — 85014 HEMATOCRIT: CPT

## 2022-11-28 RX ORDER — PANTOPRAZOLE SODIUM 40 MG/1
40 TABLET, DELAYED RELEASE ORAL
Qty: 30 TABLET | Refills: 3 | Status: CANCELLED | OUTPATIENT
Start: 2022-11-28

## 2022-11-28 RX ORDER — PANTOPRAZOLE SODIUM 40 MG/1
TABLET, DELAYED RELEASE ORAL
Qty: 30 TABLET | Refills: 2 | OUTPATIENT
Start: 2022-11-28

## 2022-11-28 NOTE — TELEPHONE ENCOUNTER
Adri Hererra is requesting a refill on the following medication(s):  Requested Prescriptions     Pending Prescriptions Disp Refills    pantoprazole (PROTONIX) 40 MG tablet 30 tablet 3     Sig: Take 1 tablet by mouth every morning (before breakfast)       Last Visit Date (If Applicable):  Visit date not found    Next Visit Date:    Visit date not found

## 2022-11-28 NOTE — TELEPHONE ENCOUNTER
[facility] called in a Critical for your patient. Did an H& H on patient has Hemoglobin of 6.8 Please be advised.

## 2022-11-29 RX ORDER — PANTOPRAZOLE SODIUM 40 MG/1
40 TABLET, DELAYED RELEASE ORAL
Qty: 30 TABLET | Refills: 3 | Status: SHIPPED | OUTPATIENT
Start: 2022-11-29

## 2022-11-30 ENCOUNTER — HOSPITAL ENCOUNTER (OUTPATIENT)
Age: 77
Setting detail: SPECIMEN
Discharge: HOME | End: 2022-11-30
Payer: MEDICARE

## 2022-11-30 DIAGNOSIS — I50.22 CHRONIC SYSTOLIC (CONGESTIVE) HEART FAILURE (HCC): ICD-10-CM

## 2022-11-30 DIAGNOSIS — D50.0 IRON DEFICIENCY ANEMIA DUE TO CHRONIC BLOOD LOSS: ICD-10-CM

## 2022-11-30 LAB
HCT VFR BLD CALC: 22.9 % (ref 40.7–50.3)
HEMOGLOBIN: 7 G/DL (ref 13–17)

## 2022-11-30 PROCEDURE — 85018 HEMOGLOBIN: CPT

## 2022-11-30 PROCEDURE — 85014 HEMATOCRIT: CPT

## 2022-11-30 RX ORDER — PANTOPRAZOLE SODIUM 40 MG/1
TABLET, DELAYED RELEASE ORAL
Qty: 30 TABLET | Refills: 2 | OUTPATIENT
Start: 2022-11-30

## 2022-12-02 ENCOUNTER — OFFICE VISIT (OUTPATIENT)
Dept: FAMILY MEDICINE CLINIC | Age: 77
End: 2022-12-02
Payer: MEDICARE

## 2022-12-02 VITALS
DIASTOLIC BLOOD PRESSURE: 72 MMHG | BODY MASS INDEX: 29.99 KG/M2 | SYSTOLIC BLOOD PRESSURE: 110 MMHG | WEIGHT: 209 LBS | RESPIRATION RATE: 16 BRPM | HEART RATE: 78 BPM | OXYGEN SATURATION: 98 %

## 2022-12-02 DIAGNOSIS — D50.0 IRON DEFICIENCY ANEMIA DUE TO CHRONIC BLOOD LOSS: ICD-10-CM

## 2022-12-02 DIAGNOSIS — N18.4 CKD (CHRONIC KIDNEY DISEASE) STAGE 4, GFR 15-29 ML/MIN (HCC): ICD-10-CM

## 2022-12-02 DIAGNOSIS — I25.5 ISCHEMIC CARDIOMYOPATHY: ICD-10-CM

## 2022-12-02 DIAGNOSIS — E11.9 TYPE 2 DIABETES MELLITUS WITHOUT COMPLICATION, WITHOUT LONG-TERM CURRENT USE OF INSULIN (HCC): Primary | ICD-10-CM

## 2022-12-02 PROCEDURE — G8417 CALC BMI ABV UP PARAM F/U: HCPCS | Performed by: FAMILY MEDICINE

## 2022-12-02 PROCEDURE — 3044F HG A1C LEVEL LT 7.0%: CPT | Performed by: FAMILY MEDICINE

## 2022-12-02 PROCEDURE — G8484 FLU IMMUNIZE NO ADMIN: HCPCS | Performed by: FAMILY MEDICINE

## 2022-12-02 PROCEDURE — 1124F ACP DISCUSS-NO DSCNMKR DOCD: CPT | Performed by: FAMILY MEDICINE

## 2022-12-02 PROCEDURE — 99213 OFFICE O/P EST LOW 20 MIN: CPT | Performed by: FAMILY MEDICINE

## 2022-12-02 PROCEDURE — 99214 OFFICE O/P EST MOD 30 MIN: CPT | Performed by: FAMILY MEDICINE

## 2022-12-02 PROCEDURE — G8427 DOCREV CUR MEDS BY ELIG CLIN: HCPCS | Performed by: FAMILY MEDICINE

## 2022-12-02 PROCEDURE — 1036F TOBACCO NON-USER: CPT | Performed by: FAMILY MEDICINE

## 2022-12-02 RX ORDER — HYOSCYAMINE SULFATE 0.125 MG
TABLET ORAL
COMMUNITY
Start: 2022-10-08

## 2022-12-02 RX ORDER — MORPHINE SULFATE 20 MG/ML
SOLUTION ORAL
COMMUNITY
Start: 2022-10-08

## 2022-12-02 RX ORDER — LORAZEPAM 0.5 MG/1
TABLET ORAL
COMMUNITY
Start: 2022-10-08

## 2022-12-02 RX ORDER — HALOPERIDOL 2 MG/ML
SOLUTION ORAL
COMMUNITY
Start: 2022-10-10

## 2022-12-02 NOTE — PROGRESS NOTES
HPI:  Patient comes in today for   Chief Complaint   Patient presents with    Follow-up     HGB/HCT, Hospice may graduate from Hospice. Patient here to f/u has chronic anaemia with Hgb in the 7 range has been fluctuant in that range is currently under hospice care ahs had nnedec transfusion in apst has had work up with colonoscopy no bleeding found ,H/o CKD sees nephrology pa . h/o HTN,dmTYPE 2,CAD s/p CABG in 2022,cardiomyopathy has life vest in place  ,s/p CVA in 2021 had thrombolytics no residual problems. No chest pain or SOB. His BP has gotten better since his medication were titrated. Has a sore in right great toe is improved curently with hospice care,has seen wound care and  podiatry also has seen vascular. His last hgabc was 5.7    HISTORY:  Past Medical History:   Diagnosis Date    Cerebrovascular accident (CVA) due to embolism of cerebral artery (HCC)     CKD (chronic kidney disease) stage 4, GFR 15-29 ml/min ([facility])     Coronary artery disease involving coronary bypass graft of native heart without angina pectoris     H/O lower gastrointestinal bleeding     Tobacco abuse     Type II or unspecified type diabetes mellitus without mention of complication, not stated as uncontrolled        Past Surgical History:   Procedure Laterality Date    CARDIAC SURGERY      UPPER GASTROINTESTINAL ENDOSCOPY N/A 2022    ** BEDSIDE** EGD ESOPHAGOGASTRODUODENOSCOPY performed by Bo Cat MD at [facility]        No family history on file.     Social History     Socioeconomic History    Marital status:      Spouse name: Not on file    Number of children: Not on file    Years of education: Not on file    Highest education level: Not on file   Occupational History    Not on file   Tobacco Use    Smoking status: Former     Packs/day: 1.50     Types: Cigarettes     Quit date: 2022     Years since quittin.7    Smokeless tobacco: Never   Substance and Sexual Activity    Alcohol use: No    Drug use: No    Sexual activity: Not on file   Other Topics Concern    Not on file   Social History Narrative    Not on file     Social Determinants of Health     Financial Resource Strain: Low Risk     Difficulty of Paying Living Expenses: Not very hard   Food Insecurity: No Food Insecurity    Worried About Running Out of Food in the Last Year: Never true    Ran Out of Food in the Last Year: Never true   Transportation Needs: No Transportation Needs    Lack of Transportation (Medical): No    Lack of Transportation (Non-Medical): No   Physical Activity: Inactive    Days of Exercise per Week: 0 days    Minutes of Exercise per Session: 0 min   Stress: No Stress Concern Present    Feeling of Stress : Not at all   Social Connections:  Moderately Isolated    Frequency of Communication with Friends and Family: More than three times a week    Frequency of Social Gatherings with Friends and Family: More than three times a week    Attends Caodaism Services: Never    Active Member of Clubs or Organizations: No    Attends Club or Organization Meetings: Never    Marital Status:    Intimate Partner Violence: Not on file   Housing Stability: [address] to Pay for Housing in the Last Year: No    Number of [place] in the Last Year: 1    Unstable Housing in the Last Year: No       Current Outpatient Medications   Medication Sig Dispense Refill    haloperidol (HALDOL) 2 MG/ML solution       hyoscyamine (ANASPAZ;LEVSIN) 125 MCG tablet       LORazepam (ATIVAN) 0.5 MG tablet       Morphine Sulfate (MORPHINE 20MG/ML) SOLN concentrated solution       pantoprazole (PROTONIX) 40 MG tablet Take 1 tablet by mouth every morning (before breakfast) 30 tablet 3    ferrous sulfate (IRON 325) 325 (65 Fe) MG tablet Take 1 tablet by mouth 2 times daily 60 tablet 5    famotidine (PEPCID) 20 MG tablet Take 20 mg by mouth daily      sacubitril-valsartan (ENTRESTO) 24-26 MG per tablet Take 1 tablet by mouth 2 times daily      Handicap Placard MISC by Does not apply route Good for 5 years 1 each 0    apixaban (ELIQUIS) 2.5 MG TABS tablet Take 2.5 mg by mouth in the morning and 2.5 mg before bedtime. furosemide (LASIX) 40 MG tablet Take 1 tablet by mouth 2 times daily 180 tablet 3    Multiple Vitamins-Minerals (PRESERVISION AREDS 2 PO) Take 1 tablet by mouth 2 times daily      amiodarone (CORDARONE) 200 MG tablet Take 100 mg by mouth daily      aspirin 81 MG EC tablet Take 81 mg by mouth daily      atorvastatin (LIPITOR) 40 MG tablet Take 40 mg by mouth nightly       ergocalciferol (ERGOCALCIFEROL) 1.25 MG (25391 UT) capsule TAKE ONE CAPSULE BY MOUTH ONCE EVERY WEEK FOR VITAMIN (D) DEFICIENCY      insulin lispro, 1 Unit Dial, 100 UNIT/ML SOPN Insulin 100 unit/MI subcutaneous per instructions four times daily. Sliding scale at meals and HS: 140-160=3 [id number]=8 trrgw477-001=6 mvpkr086-615=8 oieja171-878=8 units      isosorbide dinitrate (ISORDIL) 10 MG tablet Take 10 mg by mouth 2 times daily at 0800 and 1400      magnesium oxide (MAG-OX) 400 (240 Mg) MG tablet Take 200 mg by mouth daily      GLIPIZIDE PO Take 5 mg by mouth daily       metoprolol succinate (TOPROL XL) 25 MG extended release tablet Take 0.5 tablets by mouth daily (Patient not taking: Reported on 12/2/2022) 45 tablet 3    QUEtiapine (SEROQUEL) 25 MG tablet Take 25 mg by mouth (Patient not taking: No sig reported)       No current facility-administered medications for this visit. Allergies   Allergen Reactions    Simvastatin Headaches       REVIEW OF SYSTEMS:  General: No fevers, chills, change in weight  HEENT: No double vision, blurry vision, runny nose, sore throat, tinnitus  Cardio: No chest pain, palpitations, WILLIS, edema, PND  Pulmonary: No cough, hemoptysis, SOB  GI: No nausea, vomiting, dysphagia, odynophagia, diarrhea, has chronic constipation. : No dysuria, hematuria, urgency, incontinence  Musculoskeletal: No muscle or joint aches, no joint swelling  Neuro:  No dizziness/lightheadedness, no seizures  Endocrine: No polyuria, polydipsia, polyphagia, no temperature intolerance  Skin: Has great toe ulcers in right side and 3rd toe left foot in dressing. Sleep: good  Psychiatric: No depression or anxiety    PHYSICAL EXAM:  VS:  /72 (Site: Left Upper Arm, Position: Sitting, Cuff Size: Medium Adult)   Pulse 78   Resp 16   Wt 209 lb (94.8 kg)   SpO2 98%   BMI 29.99 kg/m²   General:  Alert and oriented, NAD  HEENT:  TMs, STEFAN, EOMI, Conjunctivae clear       Throat currently clear. NECK:  Supple without adenopathy or thyromegaly, no carotid bruits  LUNGS:  CTA all fields  HEART:  Irregular  ABDOMEN:  Soft and nontender without palpable abnormalities  EXTREMITIES:Both feet in dressing and boot,has mild swelling in  ankles. , no calf tenderness  NEURO:  No focal deficits. SKIN: Dry skin has some erythematous areas in both legs. ASSESSMENT/PLAN:     Diagnosis Orders   1. Type 2 diabetes mellitus without complication, without long-term current use of insulin ([facility])  Hemoglobin A1C      2. Iron deficiency anemia due to chronic blood loss        3. Ischemic cardiomyopathy        4. CKD (chronic kidney disease) stage 4, GFR 15-29 ml/min ([facility])                Orders Placed This Encounter   Procedures    Hemoglobin A1C     Standing Status:   Future     Standing Expiration Date:   12/2/2023         Requested Prescriptions      No prescriptions requested or ordered in this encounter     Patient will continue with hospice care of now. Continue oral iron supplements. Medications reviewed,continue with current meds. Check hgba1c,if stays low can cut back to glipizide 5mg 1/2 tablet daily    Return in about 4 months (around 4/2/2023), or if symptoms worsen or fail to improve.     Electronically signed by Kitty Street MD

## 2022-12-07 ENCOUNTER — HOSPITAL ENCOUNTER (OUTPATIENT)
Age: 77
Setting detail: SPECIMEN
Discharge: HOME | End: 2022-12-07
Payer: MEDICARE

## 2022-12-07 DIAGNOSIS — I50.22 CHRONIC SYSTOLIC (CONGESTIVE) HEART FAILURE (HCC): ICD-10-CM

## 2022-12-07 DIAGNOSIS — D50.0 IRON DEFICIENCY ANEMIA DUE TO CHRONIC BLOOD LOSS: ICD-10-CM

## 2022-12-07 LAB
HCT VFR BLD CALC: 23.1 % (ref 40.7–50.3)
HEMOGLOBIN: 7 G/DL (ref 13–17)

## 2022-12-07 PROCEDURE — 85018 HEMOGLOBIN: CPT

## 2022-12-07 PROCEDURE — 85014 HEMATOCRIT: CPT

## 2022-12-16 ENCOUNTER — HOSPITAL ENCOUNTER (OUTPATIENT)
Age: 77
Setting detail: SPECIMEN
Discharge: HOME | End: 2022-12-16
Payer: MEDICARE

## 2022-12-16 DIAGNOSIS — I50.22 CHRONIC SYSTOLIC (CONGESTIVE) HEART FAILURE (HCC): ICD-10-CM

## 2022-12-16 DIAGNOSIS — E11.9 TYPE 2 DIABETES MELLITUS WITHOUT COMPLICATION, WITHOUT LONG-TERM CURRENT USE OF INSULIN (HCC): ICD-10-CM

## 2022-12-16 DIAGNOSIS — D50.0 IRON DEFICIENCY ANEMIA DUE TO CHRONIC BLOOD LOSS: ICD-10-CM

## 2022-12-16 LAB
HCT VFR BLD CALC: 23.4 % (ref 40.7–50.3)
HEMOGLOBIN: 7.1 G/DL (ref 13–17)

## 2022-12-16 PROCEDURE — 83036 HEMOGLOBIN GLYCOSYLATED A1C: CPT

## 2022-12-16 PROCEDURE — 85014 HEMATOCRIT: CPT

## 2022-12-16 PROCEDURE — 85018 HEMOGLOBIN: CPT

## 2022-12-18 LAB
ESTIMATED AVERAGE GLUCOSE: 123 MG/DL
HBA1C MFR BLD: 5.9 % (ref 4–6)

## 2022-12-30 ENCOUNTER — HOSPITAL ENCOUNTER (OUTPATIENT)
Age: 77
Setting detail: SPECIMEN
Discharge: HOME | End: 2022-12-30
Payer: MEDICARE

## 2022-12-30 LAB
HCT VFR BLD CALC: 27.9 % (ref 40.7–50.3)
HEMOGLOBIN: 8.4 G/DL (ref 13–17)

## 2022-12-30 PROCEDURE — 85018 HEMOGLOBIN: CPT

## 2022-12-30 PROCEDURE — 85014 HEMATOCRIT: CPT

## 2023-01-04 ENCOUNTER — TELEPHONE (OUTPATIENT)
Dept: NEPHROLOGY | Age: 78
End: 2023-01-04

## 2023-01-04 ENCOUNTER — TELEPHONE (OUTPATIENT)
Dept: FAMILY MEDICINE CLINIC | Age: 78
End: 2023-01-04

## 2023-01-04 NOTE — TELEPHONE ENCOUNTER
Carly Valente from hospice called to let us know they d/c pt today because he wants to go the rout of seeing specialists and all future communication is to be made directly to patient.

## 2023-01-04 NOTE — TELEPHONE ENCOUNTER
Please call patient's wife back-she states she rec'd a telephone call that patient needs to get his lab work done before his Monday appointment with Dr Tresa Mendoza. Wife wanted to speak with nurse as patient has recently been taken off Hospice. She states they can get lab work this week if we need it but she needs to know if he should still come in this week or reschedule.   Call [phone]

## 2023-01-30 ENCOUNTER — HOSPITAL ENCOUNTER (OUTPATIENT)
Dept: WOUND CARE | Age: 78
Discharge: HOME | End: 2023-01-31
Payer: OTHER GOVERNMENT

## 2023-01-30 VITALS
RESPIRATION RATE: 20 BRPM | SYSTOLIC BLOOD PRESSURE: 120 MMHG | BODY MASS INDEX: 29.99 KG/M2 | HEART RATE: 68 BPM | HEIGHT: 70 IN | TEMPERATURE: 97.5 F | DIASTOLIC BLOOD PRESSURE: 58 MMHG

## 2023-01-30 DIAGNOSIS — L97.511 SKIN ULCER OF RIGHT GREAT TOE, LIMITED TO BREAKDOWN OF SKIN (HCC): Primary | ICD-10-CM

## 2023-01-30 PROCEDURE — 99213 OFFICE O/P EST LOW 20 MIN: CPT

## 2023-01-30 PROCEDURE — 99213 OFFICE O/P EST LOW 20 MIN: CPT | Performed by: PODIATRIST

## 2023-01-30 RX ORDER — GINSENG 100 MG
CAPSULE ORAL ONCE
OUTPATIENT
Start: 2023-01-30 | End: 2023-01-30

## 2023-01-30 RX ORDER — BACITRACIN, NEOMYCIN, POLYMYXIN B 400; 3.5; 5 [USP'U]/G; MG/G; [USP'U]/G
OINTMENT TOPICAL ONCE
OUTPATIENT
Start: 2023-01-30 | End: 2023-01-30

## 2023-01-30 RX ORDER — LIDOCAINE HYDROCHLORIDE 20 MG/ML
JELLY TOPICAL ONCE
OUTPATIENT
Start: 2023-01-30 | End: 2023-01-30

## 2023-01-30 RX ORDER — CLOBETASOL PROPIONATE 0.5 MG/G
OINTMENT TOPICAL ONCE
OUTPATIENT
Start: 2023-01-30 | End: 2023-01-30

## 2023-01-30 RX ORDER — LIDOCAINE 40 MG/G
CREAM TOPICAL ONCE
OUTPATIENT
Start: 2023-01-30 | End: 2023-01-30

## 2023-01-30 RX ORDER — LIDOCAINE HYDROCHLORIDE 40 MG/ML
SOLUTION TOPICAL ONCE
OUTPATIENT
Start: 2023-01-30 | End: 2023-01-30

## 2023-01-30 RX ORDER — GENTAMICIN SULFATE 1 MG/G
OINTMENT TOPICAL ONCE
OUTPATIENT
Start: 2023-01-30 | End: 2023-01-30

## 2023-01-30 RX ORDER — LIDOCAINE 50 MG/G
OINTMENT TOPICAL ONCE
OUTPATIENT
Start: 2023-01-30 | End: 2023-01-30

## 2023-01-30 RX ORDER — BACITRACIN ZINC AND POLYMYXIN B SULFATE 500; 1000 [USP'U]/G; [USP'U]/G
OINTMENT TOPICAL ONCE
OUTPATIENT
Start: 2023-01-30 | End: 2023-01-30

## 2023-01-30 RX ORDER — BETAMETHASONE DIPROPIONATE 0.05 %
OINTMENT (GRAM) TOPICAL ONCE
OUTPATIENT
Start: 2023-01-30 | End: 2023-01-30

## 2023-01-30 ASSESSMENT — PAIN SCALES - GENERAL: PAINLEVEL_OUTOF10: 0

## 2023-01-30 NOTE — PLAN OF CARE
Problem: Chronic Conditions and Co-morbidities  Goal: Patient's chronic conditions and co-morbidity symptoms are monitored and maintained or improved  Outcome: Progressing     Problem: Cognitive:  Goal: Knowledge of wound care  Description: Knowledge of wound care  Outcome: Progressing  Goal: Understands risk factors for wounds  Description: Understands risk factors for wounds  Outcome: Progressing     Problem: Wound:  Goal: Will show signs of wound healing; wound closure and no evidence of infection  Description: Will show signs of wound healing; wound closure and no evidence of infection  Outcome: Progressing     Problem: Falls - Risk of:  Goal: Will remain free from falls  Description: Will remain free from falls  Outcome: Progressing     Problem: Blood Glucose:  Goal: Ability to maintain appropriate glucose levels will improve  Description: Ability to maintain appropriate glucose levels will improve  Outcome: Progressing

## 2023-01-30 NOTE — DISCHARGE INSTRUCTIONS
Follow up as scheduled with Dr. Carolyne Pena in 2 weeks. Continue to place betadine to right great toe wound. Cover with dry dressing. Change dressing daily. SIGNS OF INFECTION  - Redness, swelling, skin hot  - Wound bed turns black or stringy yellow  - Foul odor  - Increased drainage or pus  - Increased pain  - Fever greater than 100F    CALL YOUR DOCTOR OR SEEK MEDICAL ATTENTION IF SIGNS OF INFECTION.   DO NOT WAIT UNTIL YOUR NEXT APPOINTMENT    Call the Wound Care Nurse with any other questions or concerns- [phone]

## 2023-01-30 NOTE — PROGRESS NOTES
Subjective:    Jorge Pérez is a 68 y.o. male who presents with the ulcers R hallux. Patient never had a vascular procedure. Patient was on hospice recently and now off. Patient still been using Betadine. Currently denies F/C/N/V. Overall diabetic control is not changed. Allergies   Allergen Reactions    Simvastatin Headaches       Past Medical History:   Diagnosis Date    Cerebrovascular accident (CVA) due to embolism of cerebral artery (HCC)     CKD (chronic kidney disease) stage 4, GFR 15-29 ml/min ([facility])     Coronary artery disease involving coronary bypass graft of native heart without angina pectoris     H/O lower gastrointestinal bleeding     Tobacco abuse     Type II or unspecified type diabetes mellitus without mention of complication, not stated as uncontrolled        Prior to Admission medications    Medication Sig Start Date End Date Taking?  Authorizing Provider   haloperidol (HALDOL) 2 MG/ML solution  10/10/22   Historical Provider, MD   hyoscyamine (ANASPAZ;LEVSIN) 125 MCG tablet  10/8/22   Historical Provider, MD   LORazepam (ATIVAN) 0.5 MG tablet  10/8/22   Historical Provider, MD   Morphine Sulfate (MORPHINE 20MG/ML) SOLN concentrated solution  10/8/22   Historical Provider, MD   pantoprazole (PROTONIX) 40 MG tablet Take 1 tablet by mouth every morning (before breakfast) 11/29/22   León Catherine MD   ferrous sulfate (IRON 325) 325 (65 Fe) MG tablet Take 1 tablet by mouth 2 times daily 9/26/22   León Catherine MD   famotidine (PEPCID) 20 MG tablet Take 20 mg by mouth daily 3/17/22   Historical Provider, MD   sacubitril-valsartan (ENTRESTO) 24-26 MG per tablet Take 1 tablet by mouth 2 times daily    Historical Provider, MD   Handicap Placard MISC by Does not apply route Good for 5 years 8/5/22   León Catherine MD   metoprolol succinate (TOPROL XL) 25 MG extended release tablet Take 0.5 tablets by mouth daily  Patient not taking: No sig reported 7/11/22   Montse Del Valle MD apixaban (ELIQUIS) 2.5 MG TABS tablet Take 2.5 mg by mouth in the morning and 2.5 mg before bedtime. Historical Provider, MD   furosemide (LASIX) 40 MG tablet Take 1 tablet by mouth 2 times daily 22   Niranjan Gonzalez MD   Multiple Vitamins-Minerals (PRESERVISION AREDS 2 PO) Take 1 tablet by mouth 2 times daily    Historical Provider, MD   amiodarone (CORDARONE) 200 MG tablet Take 100 mg by mouth daily 22   Historical Provider, MD   aspirin 81 MG EC tablet Take 81 mg by mouth daily    Historical Provider, MD   atorvastatin (LIPITOR) 40 MG tablet Take 40 mg by mouth nightly  21   Historical Provider, MD   ergocalciferol (ERGOCALCIFEROL) 1.25 MG (00553 UT) capsule TAKE ONE CAPSULE BY MOUTH ONCE EVERY WEEK FOR VITAMIN (D) DEFICIENCY 10/12/21   Historical Provider, MD   insulin lispro, 1 Unit Dial, 100 UNIT/ML SOPN Insulin 100 unit/MI subcutaneous per instructions four times daily. Sliding scale at meals and HS: 140-160=3 [id number]=6 khmko432-823=2 wsrqs285-320=7 zwuye310-356=4 units 22   Historical Provider, MD   isosorbide dinitrate (ISORDIL) 10 MG tablet Take 10 mg by mouth 2 times daily at 0800 and 1400 22   Historical Provider, MD   magnesium oxide (MAG-OX) 400 (240 Mg) MG tablet Take 200 mg by mouth daily 3/31/22   Historical Provider, MD   QUEtiapine (SEROQUEL) 25 MG tablet Take 25 mg by mouth  Patient not taking: No sig reported 22   Historical Provider, MD   GLIPIZIDE PO Take 5 mg by mouth daily     Historical Provider, MD       Social History     Tobacco Use    Smoking status: Former     Packs/day: 1.50     Types: Cigarettes     Quit date: 2022     Years since quittin.9    Smokeless tobacco: Never   Substance Use Topics    Alcohol use: No       ROS: All 14 ROS systems reviewed and pertinent positives noted above, all others negative.     Lower Extremity Physical Examination:     Vitals:   Vitals:    23 1400   BP: (!) 120/58   Pulse: 68   Resp: 20   Temp: 97.5 °F (36.4 °C)     General: AAO x 3 in NAD. Vascular: DP and PT pulses palpable 1/4, bilateral.  CFT >5 seconds, bilateral.  Hair growth absent to the level of the digits, bilateral.  Edema present, bilateral.  Varicosities present, bilateral. Erythema absent, bilateral. Distal Rubor present bilateral toes. Temperature decreased bilateral. Hyperpigmentation present bilateral. Atrophic skin yes. Neurological: Sensation intact to light touch to level of digits, bilateral.  Protective sensation intact 10/10 sites via 5.07/10g Jackson-Pushpa Monofilament, bilateral.  negative Tinel's, bilateral.  negative Valleix sign, bilateral.  Vibratory intact bilateral.  Reflexes Decreased bilateral.  Paresthesias negative. Dysthesias negative. Sharp/dull intact bilateral.    Musculoskeletal: Muscle strength 5/5, bilateral.  Pain present upon palpation R hallux. Normal medial longitudinal arch, bilateral.  Ankle ROM decreased,bilateral.  1st MPJ ROM within normal limits, bilateral.  Dorsally contracted digits absent. No other foot deformities. Integument:   Open lesion present, Right. Ulcer medial right hallux with healthy tissue base. No undermining no probing no fibrin. Other previous wounds patient has are resolved. Interdigital maceration absent to web spaces , Bilateral.  Nails b/l thickened, dystrophic and crumbly, discolored with subungual debris. Fissures absent, Bilateral. Hyperkeratotic tissue is absent.   Wound 05/05/22 Medial # 1 right great toe (Active)   Wound Image   01/30/23 1411   Wound Etiology Diabetic 01/30/23 1411   Dressing Status Old drainage noted 01/30/23 1411   Wound Cleansed Cleansed with saline 01/30/23 1411   Dressing/Treatment Betadine swabs/povidone iodine;Open to air 01/30/23 1411   Offloading for Diabetic Foot Ulcers Forefoot offloading shoe 01/30/23 1411   Dressing Change Due 08/16/22 08/15/22 0924   Wound Length (cm) 0.5 cm 01/30/23 1411   Wound Width (cm) 0.4 cm 01/30/23 1411   Wound Depth (cm) 0.1 cm 01/30/23 1411   Wound Surface Area (cm^2) 0.2 cm^2 01/30/23 1411   Change in Wound Size % (l*w) 96.49 01/30/23 1411   Wound Volume (cm^3) 0.02 cm^3 01/30/23 1411   Wound Healing % 96 01/30/23 1411   Post-Procedure Length (cm) 1.5 cm 01/30/23 1411   Post-Procedure Width (cm) 0.4 cm 01/30/23 1411   Post-Procedure Depth (cm) 0.1 cm 01/30/23 1411   Post-Procedure Surface Area (cm^2) 0.6 cm^2 01/30/23 1411   Post-Procedure Volume (cm^3) 0.06 cm^3 01/30/23 1411   Wound Assessment Pink/red 01/30/23 1411   Drainage Amount None 01/30/23 1411   Drainage Description Serosanguinous 01/30/23 1411   Odor None 01/30/23 1411   Yuli-wound Assessment Intact 01/30/23 1411   Margins Defined edges 01/30/23 1411   Wound Thickness Description not for Pressure Injury Partial thickness 01/30/23 1411   Number of days: 270         Asessment: Patient is a 68 y.o. male with:   Hospital Problems             Last Modified POA    PAD (peripheral artery disease) ([facility] [address] 1/30/2023 Yes    Skin ulcer of right great toe, limited to breakdown of skin ([address] 1/30/2023 Yes    Type 2 diabetes mellitus with complication ([address] 2/55/0830 Yes       Ulcer Classification:  Diabetic ulcer grade 2 C. IDSA  no infection. Plan:   Patient examined and evaluated. Diabetic foot education and exam.  Current condition and treatment options discussed in detail. No debridement needed of wound today. Continue offloading shoes. Today's dressing:betadine bid  DM foot ed and exam  Patient encouraged to still follow back up with vascular surgery. Previously they did not want to perform any procedure due to the severity of his kidney condition. Patient was recently on hospice. Patient is now off hospice and is starting to get patient back in the different specialties he was seen. Contact clinic with any questions/problems/concerns or new signs of infection. Follow-up at [facility] in 2 week(s).

## 2023-04-04 NOTE — PATIENT INSTRUCTIONS
Hub staff attempted to follow warm transfer process and was unsuccessful     Caller: Guerda Adams    Relationship to patient: Self    Best call back number: [phone]    Patient is needing: NEW MED IS NOT WORKING AS WELL AS PREVIOUS MEDS. PLEASE CALL PT AS SOON AS POSSIBLE. SHE HAS AN APPT THIS MORNING. PT ALSO SENT [employer] MESSAGE.            You will be scheduled for a Right leg angiogram at [facility]. [facility] [place]. [place] office will call you with the date and time of the procedure.